# Patient Record
Sex: FEMALE | Race: WHITE | Employment: FULL TIME | URBAN - METROPOLITAN AREA
[De-identification: names, ages, dates, MRNs, and addresses within clinical notes are randomized per-mention and may not be internally consistent; named-entity substitution may affect disease eponyms.]

---

## 2017-01-26 ENCOUNTER — OFFICE VISIT (OUTPATIENT)
Dept: INTERNAL MEDICINE CLINIC | Age: 57
End: 2017-01-26

## 2017-01-26 VITALS
HEART RATE: 99 BPM | WEIGHT: 293 LBS | RESPIRATION RATE: 12 BRPM | SYSTOLIC BLOOD PRESSURE: 134 MMHG | HEIGHT: 67 IN | DIASTOLIC BLOOD PRESSURE: 77 MMHG | TEMPERATURE: 98.8 F | BODY MASS INDEX: 45.99 KG/M2

## 2017-01-26 DIAGNOSIS — E03.9 HYPOTHYROIDISM, UNSPECIFIED TYPE: ICD-10-CM

## 2017-01-26 DIAGNOSIS — L05.91 INFECTED PILONIDAL CYST: Primary | ICD-10-CM

## 2017-01-26 DIAGNOSIS — R79.0 LOW FERRITIN: ICD-10-CM

## 2017-01-26 DIAGNOSIS — E66.01 MORBID OBESITY, UNSPECIFIED OBESITY TYPE (HCC): ICD-10-CM

## 2017-01-26 DIAGNOSIS — I10 ESSENTIAL HYPERTENSION: ICD-10-CM

## 2017-01-26 RX ORDER — AMOXICILLIN AND CLAVULANATE POTASSIUM 875; 125 MG/1; MG/1
1 TABLET, FILM COATED ORAL 2 TIMES DAILY
Qty: 20 TAB | Refills: 0 | Status: SHIPPED | OUTPATIENT
Start: 2017-01-26 | End: 2017-02-05

## 2017-01-26 RX ORDER — VALSARTAN 160 MG/1
160 TABLET ORAL DAILY
Qty: 30 TAB | Refills: 2
Start: 2017-01-26 | End: 2017-09-15

## 2017-01-26 RX ORDER — PHENTERMINE HYDROCHLORIDE 37.5 MG/1
TABLET ORAL
Qty: 30 TAB | Refills: 3 | Status: SHIPPED | OUTPATIENT
Start: 2017-01-26 | End: 2017-04-06 | Stop reason: ALTCHOICE

## 2017-01-26 RX ORDER — HYDROCODONE BITARTRATE AND ACETAMINOPHEN 5; 325 MG/1; MG/1
1 TABLET ORAL
Qty: 20 TAB | Refills: 0 | Status: SHIPPED | OUTPATIENT
Start: 2017-01-26 | End: 2017-03-07 | Stop reason: ALTCHOICE

## 2017-01-26 NOTE — PROGRESS NOTES
Patient states she is here for a routine follow up on HTN. Has a recurring pilonidal cyst.  Last surgery on this at age 23. Dr Jerome Canavan recommended starting Valsartan. Has not yet.

## 2017-01-26 NOTE — MR AVS SNAPSHOT
Visit Information Date & Time Provider Department Dept. Phone Encounter #  
 1/26/2017 11:30 AM Powell Libman, MD [facility] [address] [id number] Upcoming Health Maintenance Date Due  
 PAP AKA CERVICAL CYTOLOGY 5/7/2018 BREAST CANCER SCRN MAMMOGRAM 6/16/2018 DTaP/Tdap/Td series (2 - Td) 8/31/2021 COLONOSCOPY 7/29/2025 Allergies as of 1/26/2017  Review Complete On: 1/26/2017 By: Powell Libman, MD  
  
 Severity Noted Reaction Type Reactions Effexor [Venlafaxine]  03/05/2009    Other (comments) Htn,nose bleeding,increase in anxiety Gluten  02/24/2016    Other (comments) Irritable bowel symptoms, bloating Levaquin [Levofloxacin]  02/22/2010   Side Effect Myalgia Per pt muscle weakness Lisinopril  08/31/2012    Diarrhea Metformin  08/12/2014    Diarrhea Other Medication  10/07/2010   Side Effect Swelling Family of grains Current Immunizations  Reviewed on 9/6/2016 Name Date Influenza Vaccine 8/24/2016 Influenza Vaccine Whole 9/21/2010 TD Vaccine 3/5/2006 TDAP Vaccine 8/31/2011 Not reviewed this visit You Were Diagnosed With   
  
 Codes Comments Infected pilonidal cyst    -  Primary ICD-10-CM: L05.91 
ICD-9-CM: 685.1 Morbid obesity, unspecified [facility] type ([facility]     ICD-10-CM: E66.01 
ICD-9-CM: 278.01 Essential hypertension     ICD-10-CM: I10 
ICD-9-CM: 401.9 Hypothyroidism, unspecified type     ICD-10-CM: E03.9 ICD-9-CM: 244.9 Low ferritin     ICD-10-CM: R79.0 ICD-9-CM: 790.6 Vitals BP Pulse Temp Resp Height(growth percentile) Weight(growth percentile) 134/77 (BP 1 Location: Left arm, BP Patient Position: Sitting) 99 98.8 °F (37.1 °C) (Oral) 12 5' 7\" (1.702 m) 299 lb (135.6 kg) LMP BMI OB Status Smoking Status 08/20/2000 46.83 kg/m2 Hysterectomy Never Smoker Vitals History BMI and BSA Data Body Mass Index Body Surface Area 46.83 kg/m 2 2.53 m 2 Preferred Pharmacy Pharmacy Name Phone [facility] [place] [facility] [phone] Your Updated Medication List  
  
   
This list is accurate as of: 1/26/17 12:26 PM.  Always use your most recent med list.  
  
  
  
  
 amoxicillin-clavulanate 875-125 mg per tablet Commonly known as:  AUGMENTIN Take 1 Tab by mouth two (2) times a day for 10 days. estradiol 1 mg tablet Commonly known as:  ESTRACE Take 1 Tab by mouth daily. ferrous sulfate 142 mg (45 mg iron) ER tablet Commonly known as:  SLOW FE Take 1 Tab by mouth Daily (before breakfast). hydroCHLOROthiazide 25 mg tablet Commonly known as:  HYDRODIURIL  
TAKE 1 TABLET BY MOUTH EVERY DAY FOR HIGH BLOOD PRESSURE  
  
 * levothyroxine 150 mcg tablet Commonly known as:  SYNTHROID Taking one pill 6 days per week * SYNTHROID 175 mcg tablet Generic drug:  levothyroxine Take 1 pill once weekly  
  
 pantoprazole 40 mg tablet Commonly known as:  PROTONIX Take 40 mg by mouth daily. phentermine 37.5 mg tablet Commonly known as:  ADIPEX-P Take 1/2 in AM and 1/2 in early afternoon  
  
 traMADol 50 mg tablet Commonly known as:  ULTRAM  
Take 50 mg by mouth every six (6) hours as needed for Pain.  
  
 valsartan 160 mg tablet Commonly known as:  DIOVAN Take 1 Tab by mouth daily. VITAMIN B COMPLEX PO Take  by mouth. VITAMIN C PO Take  by mouth. VITAMIN D3 4,000 unit Cap Generic drug:  cholecalciferol (vitamin D3) Take  by mouth. * Notice: This list has 2 medication(s) that are the same as other medications prescribed for you. Read the directions carefully, and ask your doctor or other care provider to review them with you. Prescriptions Printed Refills  
 phentermine (ADIPEX-P) 37.5 mg tablet 3 Sig: Take 1/2 in AM and 1/2 in early afternoon Class: Print Prescriptions Sent to Pharmacy Refills  
 amoxicillin-clavulanate (AUGMENTIN) 875-125 mg per tablet 0 Sig: Take 1 Tab by mouth two (2) times a day for 10 days. Class: Normal  
 Pharmacy: [place] [id number] [facility] [place] [address] #: [phone] Route: Oral  
  
We Performed the Following REFERRAL TO GENERAL SURGERY [REF27 Custom] Comments:  
 Please evaluate patient for infected pilonidal cyst.  Drained some 1/26. Started augmentin Referral Information Referral ID Referred By Referred To  
  
 [id number] [address], [facility] [address] Visits Status Start Date End Date 1 New Request 1/26/17 1/26/18 If your referral has a status of pending review or denied, additional information will be sent to support the outcome of this decision. Introducing [facility] & HEALTH SERVICES! Dear Chuck Erm: Thank you for requesting a [employer] account. Our records indicate that you already have an active [employer] account. You can access your account anytime at [email or web address] Wind Did you know that you can access your hospital and ER discharge instructions at any time in [employer]? You can also review all of your test results from your hospital stay or ER visit. Additional Information If you have questions, please visit the Frequently Asked Questions section of the [employer] website at [email or web address] Remember, [employer] is NOT to be used for urgent needs. For medical emergencies, dial 911. Now available from your iPhone and Android! Please provide this summary of care documentation to your next provider. Your primary care clinician is listed as Marsha Nicholas. If you have any questions after today's visit, please call [phone].

## 2017-01-27 NOTE — PROGRESS NOTES
HISTORY OF PRESENT ILLNESS  Rohan Ogden is a 49 y.o. female. HPI     Reports 5 days of pain and swelling of her superior gluteal cleft. His of pilonidal cyst in 1980s. She is soaking it. Reports moderate pain. Hypertension- did not take phentermine today. Was given valsartan 160 mg by Dr. Suzie Whittaker, but did not start yet  Hypertension ROS: taking medications as instructed, no medication side effects noted, no TIA's, no chest pain on exertion, no dyspnea on exertion, no swelling of ankles     reports that she has never smoked. She has never used smokeless tobacco.    reports that she does not drink alcohol. BP Readings from Last 2 Encounters:   01/26/17 134/77   10/21/16 134/82     Obesity. Taking phentermine 1/2 of 37.5 mg in AM and can not tolerate 2nd dose in afternoon due to insomnia. Lost > 7lb  Wt Readings from Last 3 Encounters:   01/26/17 299 lb (135.6 kg)   10/21/16 306 lb 9.6 oz (139.1 kg)   09/06/16 309 lb 12.8 oz (140.5 kg)     Ferritin levels were 55. Was told to get levels above 70 to help w hair loss and    Review of Systems   Constitutional: Negative for diaphoresis, malaise/fatigue and weight loss. Eyes: Negative for blurred vision. Respiratory: Negative for shortness of breath. Cardiovascular: Negative for chest pain. Gastrointestinal: Negative for abdominal pain. Genitourinary: Negative for flank pain and frequency. Musculoskeletal: Negative for myalgias. Skin: Negative for rash. Neurological: Negative for dizziness, weakness and headaches. Psychiatric/Behavioral: The patient is not nervous/anxious. All other systems reviewed and are negative. Physical Exam   Constitutional: She is oriented to person, place, and time. She appears well-developed and well-nourished. No distress. Cardiovascular: Normal rate. Pulmonary/Chest: Effort normal.   Musculoskeletal:        Back:    2 cm fluctuant region with copius drainage of purlent/bloody fluid.      6 cm firm region, reportedly chronic per pt   Neurological: She is alert and oriented to person, place, and time. Psychiatric: She has a normal mood and affect. Nursing note and vitals reviewed. ASSESSMENT and PLAN  Kelby Arenas was seen today for hypertension. Diagnoses and all orders for this visit:    Infected pilonidal cyst - start antibiotics. Refer to surgery next week. -     amoxicillin-clavulanate (AUGMENTIN) 875-125 mg per tablet; Take 1 Tab by mouth two (2) times a day for 10 days.  -     REFERRAL TO GENERAL SURGERY  -     HYDROcodone-acetaminophen (NORCO) 5-325 mg per tablet; Take 1 Tab by mouth every six (6) hours as needed for Pain. Max Daily Amount: 4 Tabs. Morbid obesity, unspecified obesity type [facility]) - she is doing better. Cont med. Monitor BP  -     phentermine (ADIPEX-P) 37.5 mg tablet; Take 1/2 in AM and 1/2 in early afternoon    Essential hypertension - likely too high on phentermine. Check home BPs  -     Start valsartan (DIOVAN) 160 mg tablet; Take 1 Tab by mouth daily. Hypothyroidism, unspecified type - Controlled on current regimen. Continue current medications as written in chart. Per Dr. Keila Leon    Low ferritin - mild. -    Start  ferrous sulfate (SLOW FE) 142 mg (45 mg iron) ER tablet; Take 1 Tab by mouth Daily (before breakfast).

## 2017-03-07 ENCOUNTER — OFFICE VISIT (OUTPATIENT)
Dept: INTERNAL MEDICINE CLINIC | Age: 57
End: 2017-03-07

## 2017-03-07 VITALS
TEMPERATURE: 98.9 F | DIASTOLIC BLOOD PRESSURE: 82 MMHG | WEIGHT: 293 LBS | HEIGHT: 67 IN | RESPIRATION RATE: 18 BRPM | BODY MASS INDEX: 45.99 KG/M2 | HEART RATE: 93 BPM | OXYGEN SATURATION: 98 % | SYSTOLIC BLOOD PRESSURE: 144 MMHG

## 2017-03-07 DIAGNOSIS — J06.9 VIRAL UPPER RESPIRATORY TRACT INFECTION: ICD-10-CM

## 2017-03-07 DIAGNOSIS — J02.9 SORE THROAT: Primary | ICD-10-CM

## 2017-03-07 LAB
S PYO AG THROAT QL: NEGATIVE
VALID INTERNAL CONTROL?: YES

## 2017-03-07 RX ORDER — AZITHROMYCIN 250 MG/1
250 TABLET, FILM COATED ORAL SEE ADMIN INSTRUCTIONS
Qty: 6 TAB | Refills: 0 | Status: SHIPPED | OUTPATIENT
Start: 2017-03-07 | End: 2017-03-12

## 2017-03-07 NOTE — PROGRESS NOTES
Have you been to the ER or urgent care clinic since your last visit? No     Have you been hospitalized since your last visit? No     Have you been seen or consulted any other health care provider outside of [address] [place] [address] since your last visit (including pap smears, colonoscopy screening)?   No

## 2017-03-07 NOTE — PATIENT INSTRUCTIONS
Viral Respiratory Infection: Care Instructions  Your Care Instructions  Viruses are very small organisms. They grow in number after they enter your body. There are many types that cause different illnesses, such as colds and the mumps. The symptoms of a viral respiratory infection often start quickly. They include a fever, sore throat, and runny nose. You may also just not feel well. Or you may not want to eat much. Most viral respiratory infections are not serious. They usually get better with time and self-care. Antibiotics are not used to treat a viral infection. That's because antibiotics will not help cure a viral illness. In some cases, antiviral medicine can help your body fight a serious viral infection. Follow-up care is a key part of your treatment and safety. Be sure to make and go to all appointments, and call your doctor if you are having problems. It's also a good idea to know your test results and keep a list of the medicines you take. How can you care for yourself at home? · Rest as much as possible until you feel better. · Be safe with medicines. Take your medicine exactly as prescribed. Call your doctor if you think you are having a problem with your medicine. You will get more details on the specific medicine your doctor prescribes. · Take an over-the-counter pain medicine, such as acetaminophen (Tylenol), ibuprofen (Advil, Motrin), or naproxen (Aleve), as needed for pain and fever. Read and follow all instructions on the label. Do not give aspirin to anyone younger than 20. It has been linked to Reye syndrome, a serious illness. · Drink plenty of fluids, enough so that your urine is light yellow or clear like water. Hot fluids, such as tea or soup, may help relieve congestion in your nose and throat. If you have kidney, heart, or liver disease and have to limit fluids, talk with your doctor before you increase the amount of fluids you drink.   · Try to clear mucus from your lungs by breathing deeply and coughing. · Gargle with warm salt water once an hour. This can help reduce swelling and throat pain. Use 1 teaspoon of salt mixed in 1 cup of warm water. · Do not smoke or allow others to smoke around you. If you need help quitting, talk to your doctor about stop-smoking programs and medicines. These can increase your chances of quitting for good. To avoid spreading the virus  · Cough or sneeze into a tissue. Then throw the tissue away. · If you don't have a tissue, use your hand to cover your cough or sneeze. Then clean your hand. You can also cough into your sleeve. · Wash your hands often. Use soap and warm water. Wash for 15 to 20 seconds each time. · If you don't have soap and water near you, you can clean your hands with alcohol wipes or gel. When should you call for help? Call your doctor now or seek immediate medical care if:  · You have a new or higher fever. · Your fever lasts more than 48 hours. · You have trouble breathing. · You have a fever with a stiff neck or a severe headache. · You are sensitive to light. · You feel very sleepy or confused. Watch closely for changes in your health, and be sure to contact your doctor if:  · You do not get better as expected. Where can you learn more? Go to http://lonnie-parrish.info/. Enter A542 in the search box to learn more about \"Viral Respiratory Infection: Care Instructions. \"  Current as of: June 30, 2016  Content Version: 11.1  © 9275-8182 [employer]. Care instructions adapted under license by [employer] (which disclaims liability or warranty for this information). If you have questions about a medical condition or this instruction, always ask your healthcare professional. [address] any warranty or liability for your use of this information.

## 2017-03-07 NOTE — MR AVS SNAPSHOT
Visit Information Date & Time Provider Department Dept. Phone Encounter #  
 3/7/2017 11:40 AM Cleve Galicia NP [facility] [address] [id number] Upcoming Health Maintenance Date Due  
 PAP AKA CERVICAL CYTOLOGY 5/7/2018 BREAST CANCER SCRN MAMMOGRAM 6/16/2018 DTaP/Tdap/Td series (2 - Td) 8/31/2021 COLONOSCOPY 7/29/2025 Allergies as of 3/7/2017  Review Complete On: 3/7/2017 By: Cleve Galicia NP Severity Noted Reaction Type Reactions Effexor [Venlafaxine]  03/05/2009    Other (comments) Htn,nose bleeding,increase in anxiety Gluten  02/24/2016    Other (comments) Irritable bowel symptoms, bloating Levaquin [Levofloxacin]  02/22/2010   Side Effect Myalgia Per pt muscle weakness Lisinopril  08/31/2012    Diarrhea Metformin  08/12/2014    Diarrhea Other Medication  10/07/2010   Side Effect Swelling Family of grains Current Immunizations  Reviewed on 9/6/2016 Name Date Influenza Vaccine 8/24/2016 Influenza Vaccine Whole 9/21/2010 TD Vaccine 3/5/2006 TDAP Vaccine 8/31/2011 Not reviewed this visit You Were Diagnosed With   
  
 Codes Comments Sore throat    -  Primary ICD-10-CM: J02.9 ICD-9-CM: 190 Viral upper respiratory tract infection     ICD-10-CM: J06.9, B97.89 ICD-9-CM: 465.9 Vitals BP Pulse Temp Resp Height(growth percentile) Weight(growth percentile) 144/82 (BP 1 Location: Left arm, BP Patient Position: Sitting) 93 98.9 °F (37.2 °C) (Oral) 18 5' 7\" (1.702 m) 300 lb (136.1 kg) LMP SpO2 BMI OB Status Smoking Status 08/20/2000 98% 46.99 kg/m2 Hysterectomy Never Smoker BMI and BSA Data Body Mass Index Body Surface Area  
 46.99 kg/m 2 2.54 m 2 Preferred Pharmacy Pharmacy Name Phone [place] [address] [place], [place] [phone] Your Updated Medication List  
  
   
 This list is accurate as of: 3/7/17 11:44 AM.  Always use your most recent med list.  
  
  
  
  
 azithromycin 250 mg tablet Commonly known as:  Yaz Abu Take 1 Tab by mouth See Admin Instructions for 5 days. benzocaine-menthol 15-2.6 mg Lozg lozenge Commonly known as:  CEPACOL SORE THROAT (ROCHELLE-MEN) Take 1 Lozenge by mouth every two (2) hours as needed for Sore throat.  
  
 estradiol 1 mg tablet Commonly known as:  ESTRACE Take 1 Tab by mouth daily. ferrous sulfate 142 mg (45 mg iron) ER tablet Commonly known as:  SLOW FE Take 1 Tab by mouth Daily (before breakfast). guaiFENesin-dextromethorphan -30 mg per tablet Commonly known as:  Charlie & Charlie DM Take 1 Tab by mouth every twelve (12) hours as needed for Cough. hydroCHLOROthiazide 25 mg tablet Commonly known as:  HYDRODIURIL  
TAKE 1 TABLET BY MOUTH EVERY DAY FOR HIGH BLOOD PRESSURE  
  
 * levothyroxine 150 mcg tablet Commonly known as:  SYNTHROID Taking one pill 6 days per week * SYNTHROID 175 mcg tablet Generic drug:  levothyroxine Take 1 pill once weekly  
  
 multivits,Stress Formula-Zinc tablet Take 1 Tab by mouth daily. phentermine 37.5 mg tablet Commonly known as:  ADIPEX-P Take 1/2 in AM and 1/2 in early afternoon  
  
 valsartan 160 mg tablet Commonly known as:  DIOVAN Take 1 Tab by mouth daily. VITAMIN B COMPLEX PO Take  by mouth. VITAMIN C PO Take  by mouth. VITAMIN D3 4,000 unit Cap Generic drug:  cholecalciferol (vitamin D3) Take  by mouth. * Notice: This list has 2 medication(s) that are the same as other medications prescribed for you. Read the directions carefully, and ask your doctor or other care provider to review them with you. Prescriptions Printed Refills  
 azithromycin (ZITHROMAX) 250 mg tablet 0 Sig: Take 1 Tab by mouth See Admin Instructions for 5 days. Class: Print  Route: Oral  
  
 We Performed the Following AMB POC RAPID STREP A [51097 CPT(R)] Patient Instructions Viral Respiratory Infection: Care Instructions Your Care Instructions Viruses are very small organisms. They grow in number after they enter your body. There are many types that cause different illnesses, such as colds and the mumps. The symptoms of a viral respiratory infection often start quickly. They include a fever, sore throat, and runny nose. You may also just not feel well. Or you may not want to eat much. Most viral respiratory infections are not serious. They usually get better with time and self-care. Antibiotics are not used to treat a viral infection. That's because antibiotics will not help cure a viral illness. In some cases, antiviral medicine can help your body fight a serious viral infection. Follow-up care is a key part of your treatment and safety. Be sure to make and go to all appointments, and call your doctor if you are having problems. It's also a good idea to know your test results and keep a list of the medicines you take. How can you care for yourself at home? · Rest as much as possible until you feel better. · Be safe with medicines. Take your medicine exactly as prescribed. Call your doctor if you think you are having a problem with your medicine. You will get more details on the specific medicine your doctor prescribes. · Take an over-the-counter pain medicine, such as acetaminophen (Tylenol), ibuprofen (Advil, Motrin), or naproxen (Aleve), as needed for pain and fever. Read and follow all instructions on the label. Do not give aspirin to anyone younger than 20. It has been linked to Reye syndrome, a serious illness. · Drink plenty of fluids, enough so that your urine is light yellow or clear like water. Hot fluids, such as tea or soup, may help relieve congestion in your nose and throat.  If you have kidney, heart, or liver disease and have to limit fluids, talk with your doctor before you increase the amount of fluids you drink. · Try to clear mucus from your lungs by breathing deeply and coughing. · Gargle with warm salt water once an hour. This can help reduce swelling and throat pain. Use 1 teaspoon of salt mixed in 1 cup of warm water. · Do not smoke or allow others to smoke around you. If you need help quitting, talk to your doctor about stop-smoking programs and medicines. These can increase your chances of quitting for good. To avoid spreading the virus · Cough or sneeze into a tissue. Then throw the tissue away. · If you don't have a tissue, use your hand to cover your cough or sneeze. Then clean your hand. You can also cough into your sleeve. · Wash your hands often. Use soap and warm water. Wash for 15 to 20 seconds each time. · If you don't have soap and water near you, you can clean your hands with alcohol wipes or gel. When should you call for help? Call your doctor now or seek immediate medical care if: 
· You have a new or higher fever. · Your fever lasts more than 48 hours. · You have trouble breathing. · You have a fever with a stiff neck or a severe headache. · You are sensitive to light. · You feel very sleepy or confused. Watch closely for changes in your health, and be sure to contact your doctor if: 
· You do not get better as expected. Where can you learn more? Go to http://lonnie-parrish.info/. Enter I701 in the search box to learn more about \"Viral Respiratory Infection: Care Instructions. \" Current as of: June 30, 2016 Content Version: 11.1 © 0680-6145 [employer]. Care instructions adapted under license by [employer] (which disclaims liability or warranty for this information).  If you have questions about a medical condition or this instruction, always ask your healthcare professional. [employer] disclaims any warranty or liability for your use of this information. Introducing [facility] & HEALTH SERVICES! Dear Ernestine Arambula: Thank you for requesting a [employer] account. Our records indicate that you already have an active [employer] account. You can access your account anytime at [email or web address] Did you know that you can access your hospital and ER discharge instructions at any time in [employer]? You can also review all of your test results from your hospital stay or ER visit. Additional Information If you have questions, please visit the Frequently Asked Questions section of the [employer] website at [email or web address] Remember, [employer] is NOT to be used for urgent needs. For medical emergencies, dial 911. Now available from your iPhone and Android! Please provide this summary of care documentation to your next provider. Your primary care clinician is listed as Karena Bolton. If you have any questions after today's visit, please call [phone].

## 2017-03-07 NOTE — LETTER
NOTIFICATION RETURN TO WORK / SCHOOL 
 
3/7/2017 11:47 AM 
 
Ms. Joshua Aguirre [address] [id number] To Whom It May Concern: 
 
Joshua Aguirre is currently under the care of [address] [place] [address],[address]. Was seen in office today for medical illness and advised to remain out of work until 3/9/2017 She will return to work/school on: 3/9/2017 If there are questions or concerns please have the patient contact our office.  
 
 
 
Sincerely, 
 
 
Gloria Mcnulty NP

## 2017-03-07 NOTE — PROGRESS NOTES
HISTORY OF PRESENT ILLNESS  Jennifer Malcolm is a 49 y.o. female. HPI  Upper respiratory illness:  Jennifer Malcolm presents with complaints of congestion, sore throat, post nasal drip, cough described as harsh, nonproductive and headache for 4 days. no nausea and no vomiting . she has not had  myalgias, fever and chills. Symptoms are moderate. Over-the-counter remedies including Vitamin C and zinc tablets   has been used with poor relief of symptoms. Drinking plenty of fluids: yes  Asthma?:  no  non-smoker  Contacts with similar infections: yes         Review of Systems   Constitutional: Positive for malaise/fatigue. Negative for chills and fever. HENT: Positive for congestion and sore throat. Respiratory: Positive for cough. Negative for sputum production, shortness of breath and wheezing. Cardiovascular: Negative for chest pain and palpitations. Gastrointestinal: Negative for nausea and vomiting. Musculoskeletal: Negative for myalgias. Skin: Negative for rash. Neurological: Positive for headaches. Negative for dizziness and tingling. /82 (BP 1 Location: Left arm, BP Patient Position: Sitting)  Pulse 93  Temp 98.9 °F (37.2 °C) (Oral)   Resp 18  Ht 5' 7\" (1.702 m)  Wt 300 lb (136.1 kg)  LMP 08/20/2000  SpO2 98%  BMI 46.99 kg/m2  Physical Exam   Constitutional: She is oriented to person, place, and time. She appears well-developed and well-nourished. HENT:   Head: Normocephalic and atraumatic. Right Ear: External ear normal.   Left Ear: External ear normal.   Nose: Mucosal edema present. Right sinus exhibits no maxillary sinus tenderness. Left sinus exhibits no maxillary sinus tenderness. Mouth/Throat: Posterior oropharyngeal erythema present. No oropharyngeal exudate or posterior oropharyngeal edema. Neck: Normal range of motion. Neck supple. No thyromegaly present. Cardiovascular: Normal rate and regular rhythm.     Pulmonary/Chest: Effort normal and breath sounds normal. She has no wheezes. She has no rales. Dry cough   Lymphadenopathy:     She has no cervical adenopathy. Neurological: She is alert and oriented to person, place, and time. Psychiatric: She has a normal mood and affect. Her behavior is normal.   Nursing note and vitals reviewed. ASSESSMENT and PLAN  Juli Chiu was seen today for sore throat. Diagnoses and all orders for this visit:    Sore throat  -     AMB POC RAPID STREP A -- negative  -     benzocaine-menthol (CEPACOL SORE THROAT, ROCHELLE-MEN,) 15-2.6 mg lozg lozenge; Take 1 Lozenge by mouth every two (2) hours as needed for Sore throat. Viral upper respiratory tract infection -- symptomatic treatment at this point but given printed Rx for Zpack to fill if symptoms worsen over the next 48-72 hours. -     azithromycin (ZITHROMAX) 250 mg tablet; Take 1 Tab by mouth See Admin Instructions for 5 days.  -     guaiFENesin-dextromethorphan SR (MUCINEX DM) 600-30 mg per tablet; Take 1 Tab by mouth every twelve (12) hours as needed for Cough.       lab results and schedule of future lab studies reviewed with patient  reviewed diet, exercise and weight control  reviewed medications and side effects in detail

## 2017-03-14 RX ORDER — ALBUTEROL SULFATE 90 UG/1
1 AEROSOL, METERED RESPIRATORY (INHALATION)
Qty: 1 INHALER | Refills: 3 | Status: SHIPPED | OUTPATIENT
Start: 2017-03-14 | End: 2017-09-15

## 2017-03-14 RX ORDER — BENZONATATE 200 MG/1
200 CAPSULE ORAL
Qty: 21 CAP | Refills: 0 | Status: SHIPPED | OUTPATIENT
Start: 2017-03-14 | End: 2017-03-21

## 2017-03-31 ENCOUNTER — TELEPHONE (OUTPATIENT)
Dept: INTERNAL MEDICINE CLINIC | Age: 57
End: 2017-03-31

## 2017-03-31 NOTE — TELEPHONE ENCOUNTER
Pt called in to schedule apt with Dr Jw Wade. Scheduled for 4/6/17 at 815 AM she is having clicking noise in her ear and congestion. That was the next available apt. She only wants to see Dr Jw Wade but thought he may refer her to ENT and would like to talk with nurse .  Please call her at [phone]

## 2017-04-06 ENCOUNTER — OFFICE VISIT (OUTPATIENT)
Dept: INTERNAL MEDICINE CLINIC | Age: 57
End: 2017-04-06

## 2017-04-06 VITALS
HEART RATE: 90 BPM | HEIGHT: 67 IN | DIASTOLIC BLOOD PRESSURE: 79 MMHG | TEMPERATURE: 97.8 F | BODY MASS INDEX: 45.99 KG/M2 | OXYGEN SATURATION: 98 % | SYSTOLIC BLOOD PRESSURE: 135 MMHG | WEIGHT: 293 LBS | RESPIRATION RATE: 12 BRPM

## 2017-04-06 DIAGNOSIS — Z91.09 ENVIRONMENTAL ALLERGIES: Primary | ICD-10-CM

## 2017-04-06 DIAGNOSIS — H69.92 EUSTACHIAN TUBE DISORDER, LEFT: ICD-10-CM

## 2017-04-06 DIAGNOSIS — I10 ESSENTIAL HYPERTENSION: ICD-10-CM

## 2017-04-06 RX ORDER — FLUTICASONE PROPIONATE 50 MCG
2 SPRAY, SUSPENSION (ML) NASAL DAILY
Qty: 1 BOTTLE | Refills: 5
Start: 2017-04-06 | End: 2017-09-15

## 2017-04-06 RX ORDER — MINERAL OIL
180 ENEMA (ML) RECTAL DAILY
Qty: 30 TAB | Refills: 2
Start: 2017-04-06 | End: 2017-09-15

## 2017-04-06 NOTE — PROGRESS NOTES
Patient presents with a ticking in her left ear. Saw ENT 3 days. Her blood pressure was high. Started Diovan 3 days ago. Saw Idris Issa, still with chest congestion.

## 2017-04-06 NOTE — PATIENT INSTRUCTIONS
Allergy treatments    1 - Nasal saline rinse daily (try NeilMed sinus rinse)  2- Take steroid nasal spray (fluticasone, Nasonex, Nasocort)   3 - Allegra (fexofenadine) daily.    4 - Can add benadryl 25-50 mg at night for allergies/congestion and sleep

## 2017-04-06 NOTE — PROGRESS NOTES
HISTORY OF PRESENT ILLNESS    Presents with left ear clicking for 2 week(s). Saw ENT Dr. Adi Vogt 3 days ago. He said hearing was ok, normal exam.  Was told it may be the muscle behind the ear may be twitching. It is overall a little better over the past few days. Hypertension- her BP was 160/100 3 days ago at ENT. She had been up all night before. She started valsartan after this (3 days ago)  Hypertension ROS: no medication side effects noted, no TIA's, no chest pain on exertion, no dyspnea on exertion, no swelling of ankles     reports that she has never smoked. She has never used smokeless tobacco.    reports that she does not drink alcohol. BP Readings from Last 2 Encounters:   04/06/17 135/79   03/07/17 144/82     Also reports cough, productive, for 1 months. Evelio Hoffman. Review of Systems   All other systems reviewed and are negative, except as noted in HPI    Past Medical and Surgical History   has a past medical history of AR (allergic rhinitis); Biliary dyskinesia; Depression; Endometriosis; Environmental allergies (10/2010); Gluten intolerance; HTN (hypertension); Hyperlipidemia LDL goal < 130; Hypothyroidism; Impaired fasting glucose; Menopausal disorder; MR (mitral regurgitation); Sebaceous cyst of breast; Skin abnormalities (8/2010); Vitamin D deficiency; and VSD (ventricular septal defect). has a past surgical history that includes susie and bso (10/2008); tonsillectomy; femur/knee surg unlisted (12/30/2010); colonoscopy (9/2009); lap cholecystectomy (8/01/2014); colonoscopy (07/29/2015); endoscopy (07/29/2015); and cyst removal (1983). reports that she has never smoked. She has never used smokeless tobacco. She reports that she does not drink alcohol or use illicit drugs.   family history includes Arthritis-rheumatoid in her paternal grandmother; Cancer in her mother; Deep Vein Thrombosis in her father; Diabetes in her maternal grandmother; Heart Attack in her paternal grandfather; Heart Disease in her father and paternal grandfather; High Cholesterol in her father and paternal grandfather; Osteoporosis in her maternal aunt; Stroke in her mother; Thyroid Disease in her paternal grandmother. Physical Exam   Nursing note and vitals reviewed. Blood pressure 135/79, pulse 90, temperature 97.8 °F (36.6 °C), temperature source Oral, resp. rate 12, height 5' 7\" (1.702 m), weight 304 lb (137.9 kg), last menstrual period 08/20/2000, SpO2 98 %. Constitutional: In no distress. Eyes: Conjunctivae are normal.  HEENT:  No LAD or thyromegaly . Left ear normal  Cardiovascular: Normal rate. regular rhythm. No murmurs  No edema  Pulmonary/Chest: Effort normal. clear to ausculation blaterally  Musculoskeletal:  no edema. Abd:  Neurological: Alert and oriented. Grossly intact cranial nerves and motor function. Skin: No rash noted. Psychiatric: Normal mood and affect. Behavior is normal.     ASSESSMENT and PLAN  Rowena Franco was seen today for hypertension. Diagnoses and all orders for this visit:    Environmental allergies  -     fexofenadine (ALLEGRA) 180 mg tablet; Take 1 Tab by mouth daily. -     fluticasone (FLONASE) 50 mcg/actuation nasal spray; 2 Sprays by Both Nostrils route daily. Eustachian tube disorder, left  -     fluticasone (FLONASE) 50 mcg/actuation nasal spray; 2 Sprays by Both Nostrils route daily. Essential hypertension - improved since she re-started diovan    Can resume phentermine, but monitor BP closely        lab results and schedule of future lab studies reviewed with patient  reviewed medications and side effects in detail    Return to clinic for further evaluation if new symptoms develop or if current symptoms worsen or fail to resolve. There are no Patient Instructions on file for this visit.

## 2017-04-25 RX ORDER — HYDROCHLOROTHIAZIDE 25 MG/1
TABLET ORAL
Qty: 30 TAB | Refills: 5 | Status: SHIPPED | OUTPATIENT
Start: 2017-04-25 | End: 2017-10-25 | Stop reason: SDUPTHER

## 2017-05-11 ENCOUNTER — OFFICE VISIT (OUTPATIENT)
Dept: INTERNAL MEDICINE CLINIC | Age: 57
End: 2017-05-11

## 2017-05-11 VITALS
SYSTOLIC BLOOD PRESSURE: 160 MMHG | RESPIRATION RATE: 18 BRPM | HEIGHT: 67 IN | OXYGEN SATURATION: 98 % | DIASTOLIC BLOOD PRESSURE: 103 MMHG | WEIGHT: 293 LBS | BODY MASS INDEX: 45.99 KG/M2 | TEMPERATURE: 98.5 F | HEART RATE: 102 BPM

## 2017-05-11 DIAGNOSIS — L05.91 INFECTED PILONIDAL CYST: Primary | ICD-10-CM

## 2017-05-11 RX ORDER — AMOXICILLIN AND CLAVULANATE POTASSIUM 875; 125 MG/1; MG/1
1 TABLET, FILM COATED ORAL 2 TIMES DAILY
Qty: 20 TAB | Refills: 0 | Status: SHIPPED | OUTPATIENT
Start: 2017-05-11 | End: 2017-09-15

## 2017-05-11 RX ORDER — HYDROCODONE BITARTRATE AND ACETAMINOPHEN 5; 325 MG/1; MG/1
1 TABLET ORAL
Qty: 20 TAB | Refills: 0 | Status: SHIPPED | OUTPATIENT
Start: 2017-05-11 | End: 2017-09-15

## 2017-05-11 NOTE — MR AVS SNAPSHOT
Visit Information Date & Time Provider Department Dept. Phone Encounter #  
 5/11/2017  8:20 AM Adelaide Barriga NP [facility] [address] [id number] Upcoming Health Maintenance Date Due INFLUENZA AGE 9 TO ADULT 8/1/2017 PAP AKA CERVICAL CYTOLOGY 5/7/2018 BREAST CANCER SCRN MAMMOGRAM 6/16/2018 DTaP/Tdap/Td series (2 - Td) 8/31/2021 COLONOSCOPY 7/29/2025 Allergies as of 5/11/2017  Review Complete On: 5/11/2017 By: Adelaide Barriga NP Severity Noted Reaction Type Reactions Effexor [Venlafaxine]  03/05/2009    Other (comments) Htn,nose bleeding,increase in anxiety Gluten  02/24/2016    Other (comments) Irritable bowel symptoms, bloating Levaquin [Levofloxacin]  02/22/2010   Side Effect Myalgia Per pt muscle weakness Lisinopril  08/31/2012    Diarrhea Metformin  08/12/2014    Diarrhea Other Medication  10/07/2010   Side Effect Swelling Family of grains Current Immunizations  Reviewed on 9/6/2016 Name Date Influenza Vaccine 8/24/2016 Influenza Vaccine Whole 9/21/2010 TD Vaccine 3/5/2006 TDAP Vaccine 8/31/2011 Not reviewed this visit You Were Diagnosed With   
  
 Codes Comments Infected pilonidal cyst    -  Primary ICD-10-CM: L05.91 
ICD-9-CM: 352. 1 Vitals BP Pulse Temp Resp Height(growth percentile) Weight(growth percentile) (!) 160/103 (!) 102 98.5 °F (36.9 °C) (Oral) 18 5' 7\" (1.702 m) 304 lb (137.9 kg) LMP SpO2 BMI OB Status Smoking Status 08/20/2000 98% 47.61 kg/m2 Hysterectomy Never Smoker Vitals History BMI and BSA Data Body Mass Index Body Surface Area  
 47.61 kg/m 2 2.55 m 2 Preferred Pharmacy Pharmacy Name Phone [facility] [place] [facility] [phone] Your Updated Medication List  
  
   
This list is accurate as of: 5/11/17  8:38 AM.  Always use your most recent med list.  
 albuterol 90 mcg/actuation inhaler Commonly known as:  PROAIR HFA Take 1 Puff by inhalation every four (4) hours as needed for Wheezing or Shortness of Breath. amoxicillin-clavulanate 875-125 mg per tablet Commonly known as:  AUGMENTIN Take 1 Tab by mouth two (2) times a day. estradiol 1 mg tablet Commonly known as:  ESTRACE Take 1 Tab by mouth daily. fexofenadine 180 mg tablet Commonly known as:  Ethyl Render Take 1 Tab by mouth daily. fluticasone 50 mcg/actuation nasal spray Commonly known as:  [place] Jeremy 2 Sprays by Both Nostrils route daily. hydroCHLOROthiazide 25 mg tablet Commonly known as:  HYDRODIURIL  
TAKE 1 TABLET BY MOUTH EVERY DAY FOR HIGH BLOOD PRESSURE HYDROcodone-acetaminophen 5-325 mg per tablet Commonly known as:  Adilia Proper Take 1 Tab by mouth every six (6) hours as needed for Pain. Max Daily Amount: 4 Tabs. * levothyroxine 150 mcg tablet Commonly known as:  SYNTHROID Taking one pill 6 days per week * SYNTHROID 175 mcg tablet Generic drug:  levothyroxine Take 1 pill once weekly  
  
 multivits,Stress Formula-Zinc tablet Take 1 Tab by mouth daily. valsartan 160 mg tablet Commonly known as:  DIOVAN Take 1 Tab by mouth daily. VITAMIN B COMPLEX PO Take  by mouth. VITAMIN C PO Take  by mouth. VITAMIN D3 4,000 unit Cap Generic drug:  cholecalciferol (vitamin D3) Take  by mouth. * Notice: This list has 2 medication(s) that are the same as other medications prescribed for you. Read the directions carefully, and ask your doctor or other care provider to review them with you. Prescriptions Printed Refills HYDROcodone-acetaminophen (NORCO) 5-325 mg per tablet 0 Sig: Take 1 Tab by mouth every six (6) hours as needed for Pain. Max Daily Amount: 4 Tabs. Class: Print Route: Oral  
  
Prescriptions Sent to Pharmacy Refills amoxicillin-clavulanate (AUGMENTIN) 875-125 mg per tablet 0 Sig: Take 1 Tab by mouth two (2) times a day. Class: Normal  
 Pharmacy: [address] [place] [address] #: [phone] Route: Oral  
  
We Performed the Following REFERRAL TO GENERAL SURGERY [REF27 Custom] Comments:  
 Please evaluate patient for infected pilonidal cyst.  
  
Referral Information Referral ID Referred By Referred To  
  
 [id number] Humera Walker Not Available Visits Status Start Date End Date 1 New Request 5/11/17 5/11/18 If your referral has a status of pending review or denied, additional information will be sent to support the outcome of this decision. Patient Instructions Pilonidal Abscess: Care Instructions Your Care Instructions A pilonidal abscess is an infection caused by an ingrown hair. The abscess occurs in the area of the tailbone and the top of the buttocks. The infection causes a pocket of pus to form. It can be quite painful. Your doctor may have opened and drained the abscess. You can take care of yourself at home to help the area heal. In some cases, the abscess returns. Your doctor may suggest surgery to remove the site of the infection if it comes back. You may have had a sedative to help you relax. You may be unsteady after having sedation. It can take a few hours for the medicine's effects to wear off. Common side effects of sedation include nausea, vomiting, and feeling sleepy or tired. The doctor has checked you carefully, but problems can develop later. If you notice any problems or new symptoms, get medical treatment right away. Follow-up care is a key part of your treatment and safety. Be sure to make and go to all appointments, and call your doctor if you are having problems. It's also a good idea to know your test results and keep a list of the medicines you take. How can you care for yourself at home? · If the doctor gave you a sedative: ¨ For 24 hours, don't do anything that requires attention to detail. It takes time for the medicine's effects to completely wear off. ¨ For your safety, do not drive or operate any machinery that could be dangerous. Wait until the medicine wears off and you can think clearly and react easily. · If your doctor prescribed antibiotics, take them exactly as directed. Do not stop taking them just because you feel better. You need to take the full course of antibiotics. · Be safe with medicines. Take pain medicines exactly as directed. ¨ If the doctor gave you a prescription medicine for pain, take it as prescribed. ¨ If you are not taking a prescription pain medicine, ask your doctor if you can take an over-the-counter medicine. · If your doctor opened and drained your abscess, you may have gauze or other packing material inside your wound. Follow all instructions from your doctor on how to care for your wound. · Keep the area of your wound very clean. Use wet cotton balls, a warm washcloth, or baby wipes. Clean the area gently, especially after a bowel movement. When should you call for help? Call 911 anytime you think you may need emergency care. For example, call if: 
· You have trouble breathing. · You passed out (lost consciousness). Call your doctor now or seek immediate medical care if: 
· You have new or worse nausea or vomiting. · Your pain increases. · You have pain with a fever. Watch closely for changes in your health, and be sure to contact your doctor if: 
· Your pain does not get better in a day or two. Where can you learn more? Go to http://lonnie-parrish.info/. Enter 22 [id number] in the search box to learn more about \"Pilonidal Abscess: Care Instructions. \" Current as of: October 13, 2016 Content Version: 11.2 © 4559-4632 [employer].  Care instructions adapted under license by [address] (which disclaims liability or warranty for this information). If you have questions about a medical condition or this instruction, always ask your healthcare professional. [address] any warranty or liability for your use of this information. Introducing \[facility] [place] [facility]\"" & HEALTH SERVICES! Dear Mohit Wilson: Thank you for requesting a [employer] account. Our records indicate that you already have an active [employer] account. You can access your account anytime at [email or web address] Did you know that you can access your hospital and ER discharge instructions at any time in [employer]? You can also review all of your test results from your hospital stay or ER visit. Additional Information If you have questions, please visit the Frequently Asked Questions section of the [employer] website at [email or web address] Remember, [employer] is NOT to be used for urgent needs. For medical emergencies, dial 911. Now available from your iPhone and Android! Please provide this summary of care documentation to your next provider. Your primary care clinician is listed as Hayley Larson. If you have any questions after today's visit, please call [phone].

## 2017-05-11 NOTE — PATIENT INSTRUCTIONS
Pilonidal Abscess: Care Instructions  Your Care Instructions    A pilonidal abscess is an infection caused by an ingrown hair. The abscess occurs in the area of the tailbone and the top of the buttocks. The infection causes a pocket of pus to form. It can be quite painful. Your doctor may have opened and drained the abscess. You can take care of yourself at home to help the area heal. In some cases, the abscess returns. Your doctor may suggest surgery to remove the site of the infection if it comes back. You may have had a sedative to help you relax. You may be unsteady after having sedation. It can take a few hours for the medicine's effects to wear off. Common side effects of sedation include nausea, vomiting, and feeling sleepy or tired. The doctor has checked you carefully, but problems can develop later. If you notice any problems or new symptoms, get medical treatment right away. Follow-up care is a key part of your treatment and safety. Be sure to make and go to all appointments, and call your doctor if you are having problems. It's also a good idea to know your test results and keep a list of the medicines you take. How can you care for yourself at home? · If the doctor gave you a sedative:  ¨ For 24 hours, don't do anything that requires attention to detail. It takes time for the medicine's effects to completely wear off. ¨ For your safety, do not drive or operate any machinery that could be dangerous. Wait until the medicine wears off and you can think clearly and react easily. · If your doctor prescribed antibiotics, take them exactly as directed. Do not stop taking them just because you feel better. You need to take the full course of antibiotics. · Be safe with medicines. Take pain medicines exactly as directed. ¨ If the doctor gave you a prescription medicine for pain, take it as prescribed.   ¨ If you are not taking a prescription pain medicine, ask your doctor if you can take an over-the-counter medicine. · If your doctor opened and drained your abscess, you may have gauze or other packing material inside your wound. Follow all instructions from your doctor on how to care for your wound. · Keep the area of your wound very clean. Use wet cotton balls, a warm washcloth, or baby wipes. Clean the area gently, especially after a bowel movement. When should you call for help? Call 911 anytime you think you may need emergency care. For example, call if:  · You have trouble breathing. · You passed out (lost consciousness). Call your doctor now or seek immediate medical care if:  · You have new or worse nausea or vomiting. · Your pain increases. · You have pain with a fever. Watch closely for changes in your health, and be sure to contact your doctor if:  · Your pain does not get better in a day or two. Where can you learn more? Go to http://lonnie-parrish.info/. Enter 22 [id number] in the search box to learn more about \"Pilonidal Abscess: Care Instructions. \"  Current as of: October 13, 2016  Content Version: 11.2  © 5315-3869 [employer]. Care instructions adapted under license by [employer] (which disclaims liability or warranty for this information). If you have questions about a medical condition or this instruction, always ask your healthcare professional. [address] any warranty or liability for your use of this information.

## 2017-05-11 NOTE — PROGRESS NOTES
HISTORY OF PRESENT ILLNESS  Yoel Haq is a 49 y.o. female. HPI  Presents with complaints of infected pilonidal cyst that flared 2 days ago and has been progressively worsening since then. Has had history of pilonidal cyst in her late teens and had excision of cyst at age 23. Recently flared in 1/2017 and she was treated with Augmentin and pain control and was referred to Dr Casi Looney who she saw at that time. Symptoms calmed down with antibiotic course and she was told to return if symptoms returned. Has been using Epson salts sitz baths for the past 2 days and applied moist heat to area last night but has been more painful. Has not started to drain. She is having increased pain when sitting and feels uncomfortable when ambulating. Denies fever but has felt chilled at times. Patient Active Problem List   Diagnosis Code    HTN (hypertension) I10    Hypothyroidism E03.9    AR (allergic rhinitis) J30.9    Impaired fasting glucose R73.01    Biliary dyskinesia K82.8    Endometriosis N80.9    Hyperlipidemia with target LDL less than 130 E78.5    VSD (ventricular septal defect) Q21.0    Menopausal disorder N95.9    Depression F32.9    Gluten intolerance K90.0    Vitamin D deficiency E55.9    MR (mitral regurgitation) I34.0    Sebaceous cyst of skin of breast N60.89    Sebaceous cyst of breast N60.89    Skin abnormalities L98.9    Environmental allergies Z91.09     Past Surgical History:   Procedure Laterality Date    COLONOSCOPY  9/2009    Dr. Dontae Lazcano  12/30/2010    left, had lipoma removed.   Dr. Mary Jane Westbrook    HX COLONOSCOPY  07/29/2015    normal.  Dr. Sabino Cardona HX ENDOSCOPY  07/29/2015    gastritis    HX LAP CHOLECYSTECTOMY  8/01/2014    Dr. Campos Greek Carol/[place] and adhesions    HX LASHANDA AND BSO  10/2008    HX TONSILLECTOMY       Social History     Social History    Marital status: SINGLE     Spouse name: N/A    Number of children: 0  Years of education: N/A     Occupational History    Not on file. Social History Main Topics    Smoking status: Never Smoker    Smokeless tobacco: Never Used    Alcohol use No    Drug use: No    Sexual activity: Not Currently     Other Topics Concern    Not on file     Social History Narrative     Family History   Problem Relation Age of Onset   [facility] Cancer Mother      breast age 48    Stroke Mother     Heart Disease Father      CABGx4    High Cholesterol Father     Deep Vein Thrombosis Father      left arm    Diabetes Maternal Grandmother     Heart Disease Paternal Grandfather     High Cholesterol Paternal Grandfather     Heart Attack Paternal Grandfather     Arthritis-rheumatoid Paternal Grandmother     Thyroid Disease Paternal Grandmother     Osteoporosis Maternal Aunt      Current Outpatient Prescriptions   Medication Sig    amoxicillin-clavulanate (AUGMENTIN) 875-125 mg per tablet Take 1 Tab by mouth two (2) times a day.  HYDROcodone-acetaminophen (NORCO) 5-325 mg per tablet Take 1 Tab by mouth every six (6) hours as needed for Pain. Max Daily Amount: 4 Tabs.  hydroCHLOROthiazide (HYDRODIURIL) 25 mg tablet TAKE 1 TABLET BY MOUTH EVERY DAY FOR HIGH BLOOD PRESSURE    multivits,Stress Formula-Zinc tablet Take 1 Tab by mouth daily.  valsartan (DIOVAN) 160 mg tablet Take 1 Tab by mouth daily.  levothyroxine (SYNTHROID) 150 mcg tablet Taking one pill 6 days per week (Patient taking differently: 150 mcg. Taking one pill 6 days per week)    SYNTHROID 175 mcg tablet Take 1 pill once weekly    estradiol (ESTRACE) 1 mg tablet Take 1 Tab by mouth daily.  ASCORBATE CALCIUM (VITAMIN C PO) Take  by mouth.  cholecalciferol, vitamin D3, (VITAMIN D3) 4,000 unit cap Take  by mouth.  VITAMIN B COMPLEX PO Take  by mouth.  fexofenadine (ALLEGRA) 180 mg tablet Take 1 Tab by mouth daily.  fluticasone (FLONASE) 50 mcg/actuation nasal spray 2 Sprays by Both Nostrils route daily.     albuterol (PROAIR HFA) 90 mcg/actuation inhaler Take 1 Puff by inhalation every four (4) hours as needed for Wheezing or Shortness of Breath. No current facility-administered medications for this visit. Allergies   Allergen Reactions    Effexor [Venlafaxine] Other (comments)     Htn,nose bleeding,increase in anxiety    Gluten Other (comments)     Irritable bowel symptoms, bloating     Levaquin [Levofloxacin] Myalgia     Per pt muscle weakness    Lisinopril Diarrhea    Metformin Diarrhea    Other Medication Swelling     Family of grains     Immunization History   Administered Date(s) Administered    Influenza Vaccine 08/24/2016    Influenza Vaccine Whole 09/21/2010    TD Vaccine 03/05/2006    TDAP Vaccine 08/31/2011       Review of Systems   Constitutional: Positive for chills and malaise/fatigue. Negative for fever. Respiratory: Negative for cough. Cardiovascular: Negative for chest pain and palpitations. Gastrointestinal: Negative for abdominal pain, nausea and vomiting. Genitourinary: Negative for dysuria and frequency. Skin: Negative for rash. Neurological: Negative for dizziness, tingling and headaches. BP (!) 160/103  Pulse (!) 102  Temp 98.5 °F (36.9 °C) (Oral)   Resp 18  Ht 5' 7\" (1.702 m)  Wt 304 lb (137.9 kg)  LMP 08/20/2000  SpO2 98%  BMI 47.61 kg/m2  Physical Exam   Constitutional: She is oriented to person, place, and time. She appears well-developed and well-nourished. HENT:   Head: Normocephalic and atraumatic. Neck: Normal range of motion. Neck supple. Cardiovascular: S1 normal and S2 normal.  Tachycardia present. Pulmonary/Chest: Effort normal and breath sounds normal. She has no wheezes. Musculoskeletal:        Lumbar back: She exhibits tenderness. Back:    2 cm fluctuant mass with surrounding induration and erythema to gluteal cleft; no drainage at site   Neurological: She is alert and oriented to person, place, and time.    Psychiatric: Her mood appears anxious. tearful   Nursing note and vitals reviewed. ASSESSMENT and PLAN  Mohit Wilson was seen today for cyst.    Diagnoses and all orders for this visit:    Infected pilonidal cyst -- referred to general surgery who will see her this am for possible incision and drainage.  -     REFERRAL TO GENERAL SURGERY  -     amoxicillin-clavulanate (AUGMENTIN) 875-125 mg per tablet; Take 1 Tab by mouth two (2) times a day. -     HYDROcodone-acetaminophen (NORCO) 5-325 mg per tablet; Take 1 Tab by mouth every six (6) hours as needed for Pain. Max Daily Amount: 4 Tabs.       reviewed diet, exercise and weight control  reviewed medications and side effects in detail

## 2017-07-26 DIAGNOSIS — M25.50 ARTHRALGIA, UNSPECIFIED JOINT: ICD-10-CM

## 2017-07-26 RX ORDER — MELOXICAM 15 MG/1
TABLET ORAL
Qty: 30 TAB | Refills: 2 | Status: SHIPPED | OUTPATIENT
Start: 2017-07-26 | End: 2017-11-02 | Stop reason: ALTCHOICE

## 2017-09-15 ENCOUNTER — OFFICE VISIT (OUTPATIENT)
Dept: OBGYN CLINIC | Age: 57
End: 2017-09-15

## 2017-09-15 ENCOUNTER — APPOINTMENT (OUTPATIENT)
Dept: MAMMOGRAPHY | Age: 57
End: 2017-09-15

## 2017-09-15 VITALS
RESPIRATION RATE: 19 BRPM | SYSTOLIC BLOOD PRESSURE: 154 MMHG | HEIGHT: 67 IN | WEIGHT: 293 LBS | DIASTOLIC BLOOD PRESSURE: 105 MMHG | HEART RATE: 88 BPM | BODY MASS INDEX: 45.99 KG/M2

## 2017-09-15 DIAGNOSIS — Z12.31 ENCOUNTER FOR SCREENING MAMMOGRAM FOR MALIGNANT NEOPLASM OF BREAST: ICD-10-CM

## 2017-09-15 DIAGNOSIS — Z01.419 WELL FEMALE EXAM WITH ROUTINE GYNECOLOGICAL EXAM: Primary | ICD-10-CM

## 2017-09-15 DIAGNOSIS — Z11.3 SCREENING FOR STD (SEXUALLY TRANSMITTED DISEASE): ICD-10-CM

## 2017-09-15 NOTE — PROGRESS NOTES
Tonny Meng is a No obstetric history on file. ,  49 y.o. female [facility] whose Patient's last menstrual period was 08/20/2000. was on  who presents for her annual checkup. She is having no significant problems. She wants STD testing. Hormone Status:    She is not having vasomotor symptoms. She stopped her HRT. The patient is not using HRT. Sexual history:    She  reports that she does not currently engage in sexual activity. Medical conditions:    Since her last annual GYN exam about one year ago, she has had the following changes in her health history: none. Pap and Mammogram History:    Her most recent Pap smear was Negative and HPV negative obtained 2 year(s) ago. The patient had her mammogram today in our office. Breast Cancer History/Substance Abuse:    She has a family history of breast cancer. Osteoporosis History:    Family history does not include a first or second degree relative with osteopenia or osteoporosis. A bone density scan has not been previously obtained. Past Medical History:   Diagnosis Date    AR (allergic rhinitis)     Dr. Velvet Skiff dyskinesia     s/p azeb    Depression     Endometriosis     LASHANDA 10/2008 Dr. Steff Ledbetter Environmental allergies 10/2010    oats, barley, cotton seed. Dr. Sim Cabral    Gluten intolerance     HTN (hypertension)     Hyperlipidemia LDL goal < 130     Hypothyroidism     Dr. Merritt Cabrales Impaired fasting glucose     105 7/2008    Menopausal disorder     MR (mitral regurgitation)     mild    Sebaceous cyst of breast     Dr. Beverly Askew Skin abnormalities 8/2010    pre-cancerous lesions of face, Dr. Shann Rubinstein D deficiency     VSD (ventricular septal defect)     self-closed age 11, TTE normal 3/09     Past Surgical History:   Procedure Laterality Date    COLONOSCOPY  9/2009    Dr. Yeny Andrade  12/30/2010    left, had lipoma removed.   Dr. Jennifer Ybarra 07/29/2015    normal.  Dr. Bonifacio Andino HX ENDOSCOPY  07/29/2015    gastritis    HX LAP CHOLECYSTECTOMY  8/01/2014    Dr. Boris Medina/[place] and adhesions    HX LASHANDA AND BSO  10/2008    HX TONSILLECTOMY       Tobacco History:  reports that she has never smoked. She has never used smokeless tobacco.  Alcohol Abuse:  reports that she does not drink alcohol. Drug Abuse:  reports that she does not use illicit drugs. Current Outpatient Prescriptions   Medication Sig Dispense Refill    meloxicam (MOBIC) 15 mg tablet TAKE 1 TABLET BY MOUTH EVERY DAY 30 Tab 2    hydroCHLOROthiazide (HYDRODIURIL) 25 mg tablet TAKE 1 TABLET BY MOUTH EVERY DAY FOR HIGH BLOOD PRESSURE 30 Tab 5    multivits,Stress Formula-Zinc tablet Take 1 Tab by mouth daily.  SYNTHROID 175 mcg tablet Take 1 pill once weekly 5 Tab 6    ASCORBATE CALCIUM (VITAMIN C PO) Take  by mouth.  cholecalciferol, vitamin D3, (VITAMIN D3) 4,000 unit cap Take  by mouth.  VITAMIN B COMPLEX PO Take  by mouth.  estradiol (ESTRACE) 1 mg tablet TAKE 1 TABLET BY MOUTH EVERY DAY 90 Tab 0    amoxicillin-clavulanate (AUGMENTIN) 875-125 mg per tablet Take 1 Tab by mouth two (2) times a day. 20 Tab 0    HYDROcodone-acetaminophen (NORCO) 5-325 mg per tablet Take 1 Tab by mouth every six (6) hours as needed for Pain. Max Daily Amount: 4 Tabs. 20 Tab 0    fexofenadine (ALLEGRA) 180 mg tablet Take 1 Tab by mouth daily. 30 Tab 2    fluticasone (FLONASE) 50 mcg/actuation nasal spray 2 Sprays by Both Nostrils route daily. 1 Bottle 5    albuterol (PROAIR HFA) 90 mcg/actuation inhaler Take 1 Puff by inhalation every four (4) hours as needed for Wheezing or Shortness of Breath. 1 Inhaler 3    valsartan (DIOVAN) 160 mg tablet Take 1 Tab by mouth daily. 30 Tab 2    levothyroxine (SYNTHROID) 150 mcg tablet Taking one pill 6 days per week (Patient taking differently: 150 mcg.  Taking one pill 6 days per week) 30 Tab 4     Allergies: Effexor [venlafaxine]; Gluten; Levaquin [levofloxacin]; Lisinopril; Metformin; and Other medication   Social History     Social History    Marital status: SINGLE     Spouse name: N/A    Number of children: 0    Years of education: N/A     Occupational History    Not on file.      Social History Main Topics    Smoking status: Never Smoker    Smokeless tobacco: Never Used    Alcohol use No    Drug use: No    Sexual activity: Not Currently     Other Topics Concern    Not on file     Social History Narrative     Patient Active Problem List   Diagnosis Code    HTN (hypertension) I10    Hypothyroidism E03.9    AR (allergic rhinitis) J30.9    Impaired fasting glucose R73.01    Biliary dyskinesia K82.8    Endometriosis N80.9    Hyperlipidemia with target LDL less than 130 E78.5    VSD (ventricular septal defect) Q21.0    Menopausal disorder N95.9    Depression F32.9    Gluten intolerance K90.0    Vitamin D deficiency E55.9    MR (mitral regurgitation) I34.0    Sebaceous cyst of skin of breast N60.89    Sebaceous cyst of breast N60.89    Skin abnormalities L98.9    Environmental allergies Z91.09       Review of Systems - History obtained from the patient  Constitutional: negative for weight loss, fever, night sweats  HEENT: negative for hearing loss, earache, congestion, snoring, sorethroat  CV: negative for chest pain, palpitations, edema  Resp: negative for cough, shortness of breath, wheezing  GI: negative for change in bowel habits, abdominal pain, black or bloody stools  : negative for frequency, dysuria, hematuria, vaginal discharge  MSK: negative for back pain, joint pain, muscle pain  Breast: negative for breast lumps, nipple discharge, galactorrhea  Skin :negative for itching, rash, hives  Neuro: negative for dizziness, headache, confusion, weakness  Psych: negative for anxiety, depression, change in mood  Heme/lymph: negative for bleeding, bruising, pallor    Physical Exam    Visit Vitals    BP (!) 154/105 (BP 1 Location: Left arm, BP Patient Position: Sitting)    Pulse 88    Resp 19    Ht 5' 7\" (1.702 m)    Wt 304 lb (137.9 kg)    LMP 08/20/2000    BMI 47.61 kg/m2     Constitutional  · Appearance: well-nourished, well developed, alert, in no acute distress    HENT  · Head and Face: appears normal    Neck  · Inspection/Palpation: normal appearance, no masses or tenderness  Lymph Nodes: no lymphadenopathy present    Chest  · Respiratory Effort: breathing normal    Breasts  · Inspection of Breasts: breasts symmetrical, no skin changes, no discharge present, nipple appearance normal, no skin retraction present  · Palpation of Breasts and Axillae: no masses present on palpation, no breast tenderness  · Axillary Lymph Nodes: no lymphadenopathy present    Gastrointestinal  · Abdominal Examination: abdomen non-tender to palpation, normal bowel sounds, no masses present  · Liver and spleen: no hepatomegaly present, spleen not palpable  · Hernias: no hernias identified    Genitourinary  · External Genitalia: normal appearance for age, no discharge present, no tenderness present, no inflammatory lesions present, no masses present, no atrophy present  · Vagina: normal vaginal vault without central or paravaginal defects, no discharge present, no inflammatory lesions present, no masses present  · Bladder: non-tender to palpation  · Urethra: appears normal  · Cervix: normal  · Uterus: absent  · Adnexa: no adnexal tenderness present, no adnexal masses present  · Perineum: perineum within normal limits, no evidence of trauma, no rashes or skin lesions present  · Anus: anus within normal limits, no hemorrhoids present  · Inguinal Lymph Nodes: no lymphadenopathy present      Skin  · General Inspection: no rash, no lesions identified    Neurologic/Psychiatric  · Mental Status:  · Orientation: grossly oriented to person, place and time  · Mood and Affect: mood normal, affect appropriate    .   Assessment:  Routine gynecologic examination  Her current medical status is satisfactory with no evidence of significant gynecologic issues.     Plan:  Counseled re: diet, exercise, healthy lifestyle  Return for yearly wellness visits  Rec annual mammogram  Patient Verbalized understanding  STD testing sent  To see PCP for BP

## 2017-09-17 LAB
COMMENT, 144067: NORMAL
HBV SURFACE AG SERPL QL IA: NEGATIVE
HCV AB S/CO SERPL IA: <0.1 S/CO RATIO (ref 0–0.9)
HIV 1+2 AB+HIV1 P24 AG SERPL QL IA: NON REACTIVE
HSV1 IGG SER IA-ACNC: 24.4 INDEX (ref 0–0.9)
HSV2 IGG SER IA-ACNC: <0.91 INDEX (ref 0–0.9)
RPR SER QL: REACTIVE
RPR SER-TITR: ABNORMAL {TITER}

## 2017-09-19 LAB
C TRACH RRNA SPEC QL NAA+PROBE: NEGATIVE
N GONORRHOEA RRNA SPEC QL NAA+PROBE: NEGATIVE

## 2017-09-21 LAB
SPECIMEN STATUS REPORT, ROLRST: NORMAL
T PALLIDUM AB SER QL IF: NON REACTIVE

## 2017-10-05 RX ORDER — DICLOFENAC SODIUM 75 MG/1
TABLET, DELAYED RELEASE ORAL
Qty: 60 TAB | Refills: 2 | Status: SHIPPED | OUTPATIENT
Start: 2017-10-05 | End: 2017-11-02 | Stop reason: ALTCHOICE

## 2017-10-25 RX ORDER — HYDROCHLOROTHIAZIDE 25 MG/1
TABLET ORAL
Qty: 30 TAB | Refills: 5 | Status: SHIPPED | OUTPATIENT
Start: 2017-10-25 | End: 2018-04-23 | Stop reason: SDUPTHER

## 2017-11-02 ENCOUNTER — OFFICE VISIT (OUTPATIENT)
Dept: INTERNAL MEDICINE CLINIC | Age: 57
End: 2017-11-02

## 2017-11-02 VITALS
DIASTOLIC BLOOD PRESSURE: 65 MMHG | WEIGHT: 293 LBS | HEIGHT: 67 IN | HEART RATE: 92 BPM | BODY MASS INDEX: 45.99 KG/M2 | SYSTOLIC BLOOD PRESSURE: 119 MMHG | TEMPERATURE: 98.1 F | RESPIRATION RATE: 12 BRPM

## 2017-11-02 DIAGNOSIS — I10 ESSENTIAL HYPERTENSION: ICD-10-CM

## 2017-11-02 DIAGNOSIS — K43.9 ABDOMINAL WALL HERNIA: Primary | ICD-10-CM

## 2017-11-02 DIAGNOSIS — E78.5 HYPERLIPIDEMIA WITH TARGET LDL LESS THAN 130: ICD-10-CM

## 2017-11-02 DIAGNOSIS — E55.9 VITAMIN D DEFICIENCY: ICD-10-CM

## 2017-11-02 RX ORDER — DICLOFENAC SODIUM AND MISOPROSTOL 75; 200 MG/1; UG/1
TABLET, DELAYED RELEASE ORAL
Refills: 2 | COMMUNITY
Start: 2017-10-23 | End: 2018-01-24 | Stop reason: SDUPTHER

## 2017-11-02 RX ORDER — VALSARTAN 160 MG/1
80 TABLET ORAL DAILY
Qty: 30 TAB | Refills: 3
Start: 2017-11-02 | End: 2018-11-01

## 2017-11-02 RX ORDER — VALSARTAN 160 MG/1
TABLET ORAL DAILY
COMMUNITY
End: 2017-11-02 | Stop reason: SDUPTHER

## 2017-11-02 RX ORDER — LEVOTHYROXINE SODIUM 150 MCG
150 TABLET ORAL
Refills: 6 | COMMUNITY
Start: 2017-09-29 | End: 2021-03-23 | Stop reason: DRUGHIGH

## 2017-11-02 NOTE — MR AVS SNAPSHOT
Visit Information Date & Time Provider Department Dept. Phone Encounter #  
 11/2/2017  8:15 AM Laura Wesley MD [facility] [address] [id number] Upcoming Health Maintenance Date Due  
 PAP AKA CERVICAL CYTOLOGY 5/7/2018 BREAST CANCER SCRN MAMMOGRAM 9/15/2019 DTaP/Tdap/Td series (2 - Td) 8/31/2021 COLONOSCOPY 7/29/2025 Allergies as of 11/2/2017  Review Complete On: 9/15/2017 By: Suzan Bustos MD  
  
 Severity Noted Reaction Type Reactions Effexor [Venlafaxine]  03/05/2009    Other (comments) Htn,nose bleeding,increase in anxiety Gluten  02/24/2016    Other (comments) Irritable bowel symptoms, bloating Levaquin [Levofloxacin]  02/22/2010   Side Effect Myalgia Per pt muscle weakness Lisinopril  08/31/2012    Diarrhea Metformin  08/12/2014    Diarrhea Other Medication  10/07/2010   Side Effect Swelling Family of grains Current Immunizations  Reviewed on 11/2/2017 Name Date Influenza Vaccine 8/1/2017, 8/24/2016 Influenza Vaccine Whole 9/21/2010 TD Vaccine 3/5/2006 TDAP Vaccine 8/31/2011 Reviewed by Joyce Greene on 11/2/2017 at  8:22 AM  
You Were Diagnosed With   
  
 Codes Comments Abdominal wall hernia    -  Primary ICD-10-CM: K43.9 ICD-9-CM: 553.20 Essential hypertension     ICD-10-CM: I10 
ICD-9-CM: 401.9 Hyperlipidemia with target LDL less than 130     ICD-10-CM: E78.5 ICD-9-CM: 272.4 Vitamin D deficiency     ICD-10-CM: E55.9 ICD-9-CM: 268.9 Vitals BP Pulse Temp Resp Height(growth percentile) Weight(growth percentile)  
 119/65 (BP 1 Location: Left arm, BP Patient Position: Sitting) 92 98.1 °F (36.7 °C) 12 5' 7\" (1.702 m) 306 lb (138.8 kg) LMP BMI OB Status Smoking Status 08/20/2000 47.93 kg/m2 Hysterectomy Never Smoker Vitals History BMI and BSA Data Body Mass Index Body Surface Area  
 47.93 kg/m 2 2.56 m 2 Preferred Pharmacy Pharmacy Name Phone [address] [place] [phone] Your Updated Medication List  
  
   
This list is accurate as of: 11/2/17  8:51 AM.  Always use your most recent med list.  
  
  
  
  
 diclofenac-miSOPROStol  mg-mcg per tablet Commonly known as:  ARTHROTEC 75 TAKE 1 TABLET BY MOUTH TWICE A DAY  
  
 estradiol 1 mg tablet Commonly known as:  ESTRACE  
TAKE 1 TABLET BY MOUTH EVERY DAY  
  
 hydroCHLOROthiazide 25 mg tablet Commonly known as:  HYDRODIURIL  
TAKE 1 TABLET BY MOUTH EVERY DAY FOR HIGH BLOOD PRESSURE SYNTHROID 150 mcg tablet Generic drug:  levothyroxine TAKE ONE TABLET BY MOUTH ONE TIME DAILY  
  
 valsartan 160 mg tablet Commonly known as:  DIOVAN Take 0.5 Tabs by mouth daily. Decreased dose 11/2/17 VITAMIN B COMPLEX PO Take  by mouth. VITAMIN C PO Take  by mouth. VITAMIN D3 4,000 unit Cap Generic drug:  cholecalciferol (vitamin D3) Take  by mouth. ZINC PO Take  by mouth. We Performed the Following LIPID PANEL [01868 CPT(R)] METABOLIC PANEL, COMPREHENSIVE [90620 CPT(R)] REFERRAL TO GENERAL SURGERY [REF27 Custom] VITAMIN D, 25 HYDROXY [id number] CPT(R)] Referral Information Referral ID Referred By Referred To  
  
 [id number] Demetria BLANK MD [address] [place], [address] [place] [address] Pkwy Phone: [phone] Fax: [phone] Visits Status Start Date End Date 1 New Request 11/2/17 11/2/18 If your referral has a status of pending review or denied, additional information will be sent to support the outcome of this decision. Introducing [facility]! Dear Isabella Aguilera: Thank you for requesting a [employer] account. Our records indicate that you already have an active [employer] account. You can access your account anytime at [email or web address] Did you know that you can access your hospital and ER discharge instructions at any time in [facility]? You can also review all of your test results from your hospital stay or ER visit. Additional Information If you have questions, please visit the Frequently Asked Questions section of the [employer] website at [email or web address] Remember, [employer] is NOT to be used for urgent needs. For medical emergencies, dial 911. Now available from your iPhone and Android! Please provide this summary of care documentation to your next provider. Your primary care clinician is listed as Laura Wesley. If you have any questions after today's visit, please call [phone].

## 2017-11-02 NOTE — PROGRESS NOTES
HISTORY OF PRESENT ILLNESS    Chief Complaint   Patient presents with    Hypertension       Presents for follow-up    Hypertension- was off valsartan , but BP was high 2 weeks ago, 150/98, face was red. Taking 160 mg now. Taking HCTZ  Had been taking voltaren-misoprostil for shoulder  Hypertension ROS: taking medications as instructed, no medication side effects noted, no TIA's, no chest pain on exertion, no dyspnea on exertion, no swelling of ankles     reports that she has never smoked. She has never used smokeless tobacco.    reports that she does not drink alcohol. BP Readings from Last 2 Encounters:   11/02/17 119/65   09/15/17 (!) 154/105     Had right shoulder injection w Dr. Emmanuel Mora last week which helped a lot. It was severely painful    Reports mid-abdominal bulging when she eats. It is uncomfortable when swollen. Eating well. Hx cholecystectomy. Normal BMs. No n/v.  EGD 7/2015 w gastritis. Review of Systems   All other systems reviewed and are negative, except as noted in HPI    Past Medical and Surgical History   has a past medical history of AR (allergic rhinitis); Biliary dyskinesia; Depression; Endometriosis; Environmental allergies (10/2010); Gluten intolerance; HTN (hypertension); Hyperlipidemia LDL goal < 130; Hypothyroidism; Impaired fasting glucose; Menopausal disorder; MR (mitral regurgitation); Sebaceous cyst of breast; Skin abnormalities (8/2010); Vitamin D deficiency; and VSD (ventricular septal defect). has a past surgical history that includes susie and bso (10/2008); tonsillectomy; femur/knee surg unlisted (12/30/2010); colonoscopy (9/2009); lap cholecystectomy (8/01/2014); colonoscopy (07/29/2015); endoscopy (07/29/2015); and cyst removal (1983). reports that she has never smoked. She has never used smokeless tobacco. She reports that she does not drink alcohol or use illicit drugs.   family history includes Arthritis-rheumatoid in her paternal grandmother; Cancer in her mother; Deep Vein Thrombosis in her father; Diabetes in her maternal grandmother; Heart Attack in her paternal grandfather; Heart Disease in her father and paternal grandfather; High Cholesterol in her father and paternal grandfather; Osteoporosis in her maternal aunt; Stroke in her mother; Thyroid Disease in her paternal grandmother. Physical Exam   Nursing note and vitals reviewed. Blood pressure 119/65, pulse 92, temperature 98.1 °F (36.7 °C), resp. rate 12, height 5' 7\" (1.702 m), weight 306 lb (138.8 kg), last menstrual period 08/20/2000. Constitutional:  No distress. Eyes: Conjunctivae are normal.   Ears:  Hearing grossly intact  Cardiovascular: Normal rate. regular rhythm, no murmurs or gallops  No edema  Pulmonary/Chest: Effort normal.   CTAB  Musculoskeletal: moves all 4 extremities   abd - soft 5 cm hernia 4 cm above umbilicus, slightly left  Neurological: Alert and oriented to person, place, and time. Skin: No rash noted. Psychiatric: Normal mood and affect. Behavior is normal.     ASSESSMENT and PLAN  Diagnoses and all orders for this visit:    1. Abdominal wall hernia- large size. Consult surgeon. Educated patient regarding signs and symptoms of an incarcerated hernia. Seek immediate medical care if in severe pain, hernia \"stuck\",or not reducible. -     REFERRAL TO GENERAL SURGERY    2. Essential hypertension- over-controlled. -     valsartan (DIOVAN) 160 mg tablet; Take 0.5 Tabs by mouth daily. Decreased dose 51/2/37  -     METABOLIC PANEL, COMPREHENSIVE    3. Hyperlipidemia with target LDL less than 130- The patient is asked to make an attempt to improve diet and exercise patterns to aid in medical management of this problem.   -     LIPID PANEL    4. Vitamin D deficiency  -     VITAMIN D, 25 HYDROXY        There are no Patient Instructions on file for this visit.    lab results and schedule of future lab studies reviewed with patient  reviewed medications and side effects in detail    Return to clinic for further evaluation if new symptoms develop    Follow-up Disposition: Not on File    Current Outpatient Prescriptions   Medication Sig    diclofenac-miSOPROStol (ARTHROTEC 75)  mg-mcg per tablet TAKE 1 TABLET BY MOUTH TWICE A DAY    SYNTHROID 150 mcg tablet TAKE ONE TABLET BY MOUTH ONE TIME DAILY    ZINC PO Take  by mouth.  valsartan (DIOVAN) 160 mg tablet Take 0.5 Tabs by mouth daily. Decreased dose 11/2/17    hydroCHLOROthiazide (HYDRODIURIL) 25 mg tablet TAKE 1 TABLET BY MOUTH EVERY DAY FOR HIGH BLOOD PRESSURE    estradiol (ESTRACE) 1 mg tablet TAKE 1 TABLET BY MOUTH EVERY DAY    ASCORBATE CALCIUM (VITAMIN C PO) Take  by mouth.  cholecalciferol, vitamin D3, (VITAMIN D3) 4,000 unit cap Take  by mouth.  VITAMIN B COMPLEX PO Take  by mouth. No current facility-administered medications for this visit.

## 2017-11-03 LAB
25(OH)D3+25(OH)D2 SERPL-MCNC: 53.2 NG/ML (ref 30–100)
ALBUMIN SERPL-MCNC: 4.4 G/DL (ref 3.5–5.5)
ALBUMIN/GLOB SERPL: 2 {RATIO} (ref 1.2–2.2)
ALP SERPL-CCNC: 93 IU/L (ref 39–117)
ALT SERPL-CCNC: 26 IU/L (ref 0–32)
AST SERPL-CCNC: 17 IU/L (ref 0–40)
BILIRUB SERPL-MCNC: 0.3 MG/DL (ref 0–1.2)
BUN SERPL-MCNC: 13 MG/DL (ref 6–24)
BUN/CREAT SERPL: 21 (ref 9–23)
CALCIUM SERPL-MCNC: 9.7 MG/DL (ref 8.7–10.2)
CHLORIDE SERPL-SCNC: 98 MMOL/L (ref 96–106)
CHOLEST SERPL-MCNC: 202 MG/DL (ref 100–199)
CO2 SERPL-SCNC: 26 MMOL/L (ref 18–29)
CREAT SERPL-MCNC: 0.63 MG/DL (ref 0.57–1)
GFR SERPLBLD CREATININE-BSD FMLA CKD-EPI: 101 ML/MIN/1.73
GFR SERPLBLD CREATININE-BSD FMLA CKD-EPI: 117 ML/MIN/1.73
GLOBULIN SER CALC-MCNC: 2.2 G/DL (ref 1.5–4.5)
GLUCOSE SERPL-MCNC: 107 MG/DL (ref 65–99)
HDLC SERPL-MCNC: 55 MG/DL
INTERPRETATION, 910389: NORMAL
LDLC SERPL CALC-MCNC: 130 MG/DL (ref 0–99)
POTASSIUM SERPL-SCNC: 4 MMOL/L (ref 3.5–5.2)
PROT SERPL-MCNC: 6.6 G/DL (ref 6–8.5)
SODIUM SERPL-SCNC: 144 MMOL/L (ref 134–144)
TRIGL SERPL-MCNC: 85 MG/DL (ref 0–149)
VLDLC SERPL CALC-MCNC: 17 MG/DL (ref 5–40)

## 2018-01-03 ENCOUNTER — OFFICE VISIT (OUTPATIENT)
Dept: INTERNAL MEDICINE CLINIC | Age: 58
End: 2018-01-03

## 2018-01-03 ENCOUNTER — TELEPHONE (OUTPATIENT)
Dept: INTERNAL MEDICINE CLINIC | Age: 58
End: 2018-01-03

## 2018-01-03 VITALS
OXYGEN SATURATION: 98 % | DIASTOLIC BLOOD PRESSURE: 90 MMHG | SYSTOLIC BLOOD PRESSURE: 142 MMHG | RESPIRATION RATE: 12 BRPM | HEIGHT: 67 IN | BODY MASS INDEX: 45.99 KG/M2 | TEMPERATURE: 98.4 F | HEART RATE: 95 BPM | WEIGHT: 293 LBS

## 2018-01-03 DIAGNOSIS — J40 BRONCHITIS: ICD-10-CM

## 2018-01-03 DIAGNOSIS — J45.21 MILD INTERMITTENT REACTIVE AIRWAY DISEASE WITH ACUTE EXACERBATION: ICD-10-CM

## 2018-01-03 DIAGNOSIS — J01.00 ACUTE MAXILLARY SINUSITIS, RECURRENCE NOT SPECIFIED: Primary | ICD-10-CM

## 2018-01-03 DIAGNOSIS — R05.9 COUGH: ICD-10-CM

## 2018-01-03 RX ORDER — LIOTHYRONINE SODIUM 5 UG/1
TABLET ORAL
Refills: 6 | COMMUNITY
Start: 2017-12-18 | End: 2018-11-01 | Stop reason: ALTCHOICE

## 2018-01-03 RX ORDER — PREDNISONE 20 MG/1
20 TABLET ORAL 2 TIMES DAILY
Qty: 10 TAB | Refills: 0 | Status: SHIPPED | OUTPATIENT
Start: 2018-01-03 | End: 2018-01-26 | Stop reason: ALTCHOICE

## 2018-01-03 RX ORDER — FLUCONAZOLE 150 MG/1
150 TABLET ORAL DAILY
Qty: 2 TAB | Refills: 0 | Status: SHIPPED | OUTPATIENT
Start: 2018-01-03 | End: 2018-01-04

## 2018-01-03 RX ORDER — CLARITHROMYCIN 500 MG/1
500 TABLET, FILM COATED ORAL 2 TIMES DAILY
Qty: 20 TAB | Refills: 0 | Status: SHIPPED | OUTPATIENT
Start: 2018-01-03 | End: 2018-01-26 | Stop reason: ALTCHOICE

## 2018-01-03 RX ORDER — CODEINE PHOSPHATE AND GUAIFENESIN 10; 100 MG/5ML; MG/5ML
5 SOLUTION ORAL
Qty: 118 ML | Refills: 0 | Status: SHIPPED | OUTPATIENT
Start: 2018-01-03 | End: 2018-01-26 | Stop reason: ALTCHOICE

## 2018-01-03 RX ORDER — LEVALBUTEROL INHALATION SOLUTION 1.25 MG/3ML
1.25 SOLUTION RESPIRATORY (INHALATION)
Qty: 3 ML | Refills: 0
Start: 2018-01-03 | End: 2018-01-03

## 2018-01-03 RX ORDER — ALBUTEROL SULFATE 90 UG/1
2 AEROSOL, METERED RESPIRATORY (INHALATION)
Qty: 1 INHALER | Refills: 2 | Status: SHIPPED | OUTPATIENT
Start: 2018-01-03 | End: 2018-11-01 | Stop reason: ALTCHOICE

## 2018-01-03 NOTE — TELEPHONE ENCOUNTER
I spoke to pharmacist, he states their is significant drug interaction with Biaxin and Diflucan, it can cause heart arrhythmia. Per NP, pt was advise to take the Diflucan AFTER she finishes the Biaxin, IF she develops a yeast infection. Pt was given a paper script to fill after she finishes abx. I called pharmacy, pharmacist verbalized understanding.

## 2018-01-03 NOTE — LETTER
NOTIFICATION RETURN TO WORK / SCHOOL 
 
1/3/2018 10:35 AM 
 
Ms. Moises Rojas [facility] [phone] To Whom It May Concern: 
 
Moises Rojas is currently under the care of [address] [place] [address],[address]. Was seen in office today for medical illness and was advised to remain out of work due to illness. She will return to work/school on: 1/6/2018 If there are questions or concerns please have the patient contact our office.  
 
 
 
Sincerely, 
 
 
Kleber Avalos NP

## 2018-01-03 NOTE — MR AVS SNAPSHOT
Visit Information Date & Time Provider Department Dept. Phone Encounter #  
 1/3/2018  9:40 AM Eryn Bhatt NP [facility] [address] [id number] Upcoming Health Maintenance Date Due  
 PAP AKA CERVICAL CYTOLOGY 5/7/2018 BREAST CANCER SCRN MAMMOGRAM 9/15/2019 DTaP/Tdap/Td series (2 - Td) 8/31/2021 COLONOSCOPY 7/29/2025 Allergies as of 1/3/2018  Review Complete On: 1/3/2018 By: Eryn Bhatt NP Severity Noted Reaction Type Reactions Effexor [Venlafaxine]  03/05/2009    Other (comments) Htn,nose bleeding,increase in anxiety Gluten  02/24/2016    Other (comments) Irritable bowel symptoms, bloating Levaquin [Levofloxacin]  02/22/2010   Side Effect Myalgia Per pt muscle weakness Lisinopril  08/31/2012    Diarrhea Metformin  08/12/2014    Diarrhea Other Medication  10/07/2010   Side Effect Swelling Family of grains Current Immunizations  Reviewed on 11/2/2017 Name Date Influenza Vaccine 8/1/2017, 8/24/2016 Influenza Vaccine Whole 9/21/2010 TD Vaccine 3/5/2006 TDAP Vaccine 8/31/2011 Not reviewed this visit You Were Diagnosed With   
  
 Codes Comments Acute maxillary sinusitis, recurrence not specified    -  Primary ICD-10-CM: J01.00 ICD-9-CM: 461.0 Bronchitis     ICD-10-CM: J40 ICD-9-CM: 466 Mild intermittent reactive airway disease with acute exacerbation     ICD-10-CM: J45.21 ICD-9-CM: 342.69 Cough     ICD-10-CM: R05 ICD-9-CM: 205. 2 Vitals BP Pulse Temp Resp Height(growth percentile) Weight(growth percentile) 142/90 (BP 1 Location: Left arm, BP Patient Position: Sitting) 95 98.4 °F (36.9 °C) (Oral) 12 5' 7\" (1.702 m) 307 lb 6.4 oz (139.4 kg) LMP SpO2 BMI OB Status Smoking Status 08/20/2000 98% 48.15 kg/m2 Hysterectomy Never Smoker Vitals History BMI and BSA Data  Body Mass Index Body Surface Area  
 48.15 kg/m 2 2.57 m 2  
  
  
 Preferred Pharmacy Pharmacy Name Phone [facility] [place] Joss Thompson [phone] Your Updated Medication List  
  
   
This list is accurate as of: 1/3/18 10:37 AM.  Always use your most recent med list.  
  
  
  
  
 albuterol 90 mcg/actuation inhaler Commonly known as:  PROVENTIL HFA, VENTOLIN HFA, PROAIR HFA Take 2 Puffs by inhalation every four (4) hours as needed for Wheezing. clarithromycin 500 mg tablet Commonly known as:  Joan Erik Take 1 Tab by mouth two (2) times a day. diclofenac-miSOPROStol  mg-mcg per tablet Commonly known as:  ARTHROTEC 75 TAKE 1 TABLET BY MOUTH TWICE A DAY  
  
 estradiol 1 mg tablet Commonly known as:  ESTRACE  
TAKE 1 TABLET BY MOUTH EVERY DAY  
  
 fluconazole 150 mg tablet Commonly known as:  DIFLUCAN Take 1 Tab by mouth daily for 1 day. May repeat in 4 days with 2nd tab if needed  
  
 guaiFENesin-codeine 100-10 mg/5 mL solution Commonly known as:  ROBITUSSIN AC Take 5 mL by mouth three (3) times daily as needed for Cough. Max Daily Amount: 15 mL.  
  
 guaiFENesin-dextromethorphan -30 mg per tablet Commonly known as:  [place] DM Take 1 Tab by mouth every twelve (12) hours as needed for Cough. hydroCHLOROthiazide 25 mg tablet Commonly known as:  HYDRODIURIL  
TAKE 1 TABLET BY MOUTH EVERY DAY FOR HIGH BLOOD PRESSURE  
  
 levalbuterol 1.25 mg/3 mL Nebu Commonly known as:  XOPENEX  
3 mL by Nebulization route now for 1 dose. liothyronine 5 mcg tablet Commonly known as:  CYTOMEL  
TAKE HALF TABLET BY MOUTH TWICE A DAY AS DIRECTED  
  
 MUCINEX PO Take  by mouth.  
  
 predniSONE 20 mg tablet Commonly known as:  Chris Mellow Take 1 Tab by mouth two (2) times a day. SYNTHROID 150 mcg tablet Generic drug:  levothyroxine TAKE ONE TABLET BY MOUTH ONE TIME DAILY  
  
 valsartan 160 mg tablet Commonly known as:  DIOVAN  
 Take 0.5 Tabs by mouth daily. Decreased dose 11/2/17 VITAMIN B COMPLEX PO Take  by mouth. VITAMIN C PO Take  by mouth. VITAMIN D3 4,000 unit Cap Generic drug:  cholecalciferol (vitamin D3) Take  by mouth. ZINC PO Take  by mouth. Prescriptions Printed Refills  
 guaiFENesin-codeine (ROBITUSSIN AC) 100-10 mg/5 mL solution 0 Sig: Take 5 mL by mouth three (3) times daily as needed for Cough. Max Daily Amount: 15 mL. Class: Print Route: Oral  
 fluconazole (DIFLUCAN) 150 mg tablet 0 Sig: Take 1 Tab by mouth daily for 1 day. May repeat in 4 days with 2nd tab if needed Class: Print Route: Oral  
  
Prescriptions Sent to Pharmacy Refills  
 clarithromycin (BIAXIN) 500 mg tablet 0 Sig: Take 1 Tab by mouth two (2) times a day. Class: Normal  
 Pharmacy: [address] Ph #: [phone] Route: Oral  
 predniSONE (DELTASONE) 20 mg tablet 0 Sig: Take 1 Tab by mouth two (2) times a day. Class: Normal  
 Pharmacy: [address] Ph #: [phone] Route: Oral  
 albuterol (PROVENTIL HFA, VENTOLIN HFA, PROAIR HFA) 90 mcg/actuation inhaler 2 Sig: Take 2 Puffs by inhalation every four (4) hours as needed for Wheezing. Class: Normal  
 Pharmacy: [address] Ph #: [phone] Route: Inhalation We Performed the Following LEVALBUTEROL, INHAL. SOL., FDA-APPROVED FINAL, NON-COMPOUND UNIT DOSE, 0.5 MG [ [facility]] [place] INHAL RX, AIRWAY OBST/DX SPUTUM INDUCT [id number] CPT(R)] Patient Instructions Bronchitis: Care Instructions Your Care Instructions Bronchitis is inflammation of the bronchial tubes, which carry air to the lungs. The tubes swell and produce mucus, or phlegm. The mucus and inflamed bronchial tubes make you cough. You may have trouble breathing. Most cases of bronchitis are caused by viruses like those that cause colds. Antibiotics usually do not help and they may be harmful. Bronchitis usually develops rapidly and lasts about 2 to 3 weeks in otherwise healthy people. Follow-up care is a key part of your treatment and safety. Be sure to make and go to all appointments, and call your doctor if you are having problems. It's also a good idea to know your test results and keep a list of the medicines you take. How can you care for yourself at home? · Take all medicines exactly as prescribed. Call your doctor if you think you are having a problem with your medicine. · Get some extra rest. 
· Take an over-the-counter pain medicine, such as acetaminophen (Tylenol), ibuprofen (Advil, Motrin), or naproxen (Aleve) to reduce fever and relieve body aches. Read and follow all instructions on the label. · Do not take two or more pain medicines at the same time unless the doctor told you to. Many pain medicines have acetaminophen, which is Tylenol. Too much acetaminophen (Tylenol) can be harmful. · Take an over-the-counter cough medicine that contains dextromethorphan to help quiet a dry, hacking cough so that you can sleep. Avoid cough medicines that have more than one active ingredient. Read and follow all instructions on the label. · Breathe moist air from a humidifier, hot shower, or sink filled with hot water. The heat and moisture will thin mucus so you can cough it out. · Do not smoke. Smoking can make bronchitis worse. If you need help quitting, talk to your doctor about stop-smoking programs and medicines. These can increase your chances of quitting for good. When should you call for help? Call 911 anytime you think you may need emergency care. For example, call if: 
? · You have severe trouble breathing. ?Call your doctor now or seek immediate medical care if: 
? · You have new or worse trouble breathing. ? · You cough up dark brown or bloody mucus (sputum). ? · You have a new or higher fever. ? · You have a new rash. ? Watch closely for changes in your health, and be sure to contact your doctor if: 
? · You cough more deeply or more often, especially if you notice more mucus or a change in the color of your mucus. ? · You are not getting better as expected. Where can you learn more? Go to http://lonnie-parrish.info/. Enter H333 in the search box to learn more about \"Bronchitis: Care Instructions. \" Current as of: May 12, 2017 Content Version: 11.4 © 8033-1031 [employer]. Care instructions adapted under license by [employer] (which disclaims liability or warranty for this information). If you have questions about a medical condition or this instruction, always ask your healthcare professional. [place] [address] any warranty or liability for your use of this information. Sinusitis: Care Instructions Your Care Instructions Sinusitis is an infection of the lining of the sinus cavities in your head. Sinusitis often follows a cold. It causes pain and pressure in your head and face. In most cases, sinusitis gets better on its own in 1 to 2 weeks. But some mild symptoms may last for several weeks. Sometimes antibiotics are needed. Follow-up care is a key part of your treatment and safety. Be sure to make and go to all appointments, and call your doctor if you are having problems. It's also a good idea to know your test results and keep a list of the medicines you take. How can you care for yourself at home? · Take an over-the-counter pain medicine, such as acetaminophen (Tylenol), ibuprofen (Advil, Motrin), or naproxen (Aleve). Read and follow all instructions on the label. · If the doctor prescribed antibiotics, take them as directed. Do not stop taking them just because you feel better. You need to take the full course of antibiotics. · Be careful when taking over-the-counter cold or flu medicines and Tylenol at the same time. Many of these medicines have acetaminophen, which is Tylenol. Read the labels to make sure that you are not taking more than the recommended dose. Too much acetaminophen (Tylenol) can be harmful. · Breathe warm, moist air from a steamy shower, a hot bath, or a sink filled with hot water. Avoid cold, dry air. Using a humidifier in your home may help. Follow the directions for cleaning the machine. · Use saline (saltwater) nasal washes to help keep your nasal passages open and wash out mucus and bacteria. You can buy saline nose drops at a grocery store or drugstore. Or you can make your own at home by adding 1 teaspoon of salt and 1 teaspoon of baking soda to 2 cups of distilled water. If you make your own, fill a bulb syringe with the solution, insert the tip into your nostril, and squeeze gently. Adriana Ruff your nose. · Put a hot, wet towel or a warm gel pack on your face 3 or 4 times a day for 5 to 10 minutes each time. · Try a decongestant nasal spray like oxymetazoline (Afrin). Do not use it for more than 3 days in a row. Using it for more than 3 days can make your congestion worse. When should you call for help? Call your doctor now or seek immediate medical care if: 
? · You have new or worse swelling or redness in your face or around your eyes. ? · You have a new or higher fever. ? Watch closely for changes in your health, and be sure to contact your doctor if: 
? · You have new or worse facial pain. ? · The mucus from your nose becomes thicker (like pus) or has new blood in it. ? · You are not getting better as expected. Where can you learn more? Go to http://lonnie-parrish.info/. Enter C661 in the search box to learn more about \"Sinusitis: Care Instructions. \" Current as of: May 12, 2017 Content Version: 11.4 © 4859-9110 Healthwise, [email or web address].  Care instructions adapted under license by [address] (which disclaims liability or warranty for this information). If you have questions about a medical condition or this instruction, always ask your healthcare professional. [address] any warranty or liability for your use of this information. Introducing [facility]! Dear Billy Ramsey: Thank you for requesting a HipClub account. Our records indicate that you already have an active HipClub account. You can access your account anytime at [email or web address] Did you know that you can access your hospital and ER discharge instructions at any time in HipClub? You can also review all of your test results from your hospital stay or ER visit. Additional Information If you have questions, please visit the Frequently Asked Questions section of the HipClub website at [email or web address] Remember, HipClub is NOT to be used for urgent needs. For medical emergencies, dial 911. Now available from your iPhone and Android! Please provide this summary of care documentation to your next provider. Your primary care clinician is listed as Alannah Matthews. If you have any questions after today's visit, please call [phone].

## 2018-01-03 NOTE — PATIENT INSTRUCTIONS
Bronchitis: Care Instructions  Your Care Instructions    Bronchitis is inflammation of the bronchial tubes, which carry air to the lungs. The tubes swell and produce mucus, or phlegm. The mucus and inflamed bronchial tubes make you cough. You may have trouble breathing. Most cases of bronchitis are caused by viruses like those that cause colds. Antibiotics usually do not help and they may be harmful. Bronchitis usually develops rapidly and lasts about 2 to 3 weeks in otherwise healthy people. Follow-up care is a key part of your treatment and safety. Be sure to make and go to all appointments, and call your doctor if you are having problems. It's also a good idea to know your test results and keep a list of the medicines you take. How can you care for yourself at home? · Take all medicines exactly as prescribed. Call your doctor if you think you are having a problem with your medicine. · Get some extra rest.  · Take an over-the-counter pain medicine, such as acetaminophen (Tylenol), ibuprofen (Advil, Motrin), or naproxen (Aleve) to reduce fever and relieve body aches. Read and follow all instructions on the label. · Do not take two or more pain medicines at the same time unless the doctor told you to. Many pain medicines have acetaminophen, which is Tylenol. Too much acetaminophen (Tylenol) can be harmful. · Take an over-the-counter cough medicine that contains dextromethorphan to help quiet a dry, hacking cough so that you can sleep. Avoid cough medicines that have more than one active ingredient. Read and follow all instructions on the label. · Breathe moist air from a humidifier, hot shower, or sink filled with hot water. The heat and moisture will thin mucus so you can cough it out. · Do not smoke. Smoking can make bronchitis worse. If you need help quitting, talk to your doctor about stop-smoking programs and medicines. These can increase your chances of quitting for good.   When should you call for help? Call 911 anytime you think you may need emergency care. For example, call if:  ? · You have severe trouble breathing. ?Call your doctor now or seek immediate medical care if:  ? · You have new or worse trouble breathing. ? · You cough up dark brown or bloody mucus (sputum). ? · You have a new or higher fever. ? · You have a new rash. ? Watch closely for changes in your health, and be sure to contact your doctor if:  ? · You cough more deeply or more often, especially if you notice more mucus or a change in the color of your mucus. ? · You are not getting better as expected. Where can you learn more? Go to http://lonnie-parrish.info/. Enter H333 in the search box to learn more about \"Bronchitis: Care Instructions. \"  Current as of: May 12, 2017  Content Version: 11.4  © 0358-8536 [employer]. Care instructions adapted under license by [employer] (which disclaims liability or warranty for this information). If you have questions about a medical condition or this instruction, always ask your healthcare professional. [place] [address] any warranty or liability for your use of this information. Sinusitis: Care Instructions  Your Care Instructions    Sinusitis is an infection of the lining of the sinus cavities in your head. Sinusitis often follows a cold. It causes pain and pressure in your head and face. In most cases, sinusitis gets better on its own in 1 to 2 weeks. But some mild symptoms may last for several weeks. Sometimes antibiotics are needed. Follow-up care is a key part of your treatment and safety. Be sure to make and go to all appointments, and call your doctor if you are having problems. It's also a good idea to know your test results and keep a list of the medicines you take. How can you care for yourself at home?   · Take an over-the-counter pain medicine, such as acetaminophen (Tylenol), ibuprofen (Advil, Motrin), or naproxen (Aleve). Read and follow all instructions on the label. · If the doctor prescribed antibiotics, take them as directed. Do not stop taking them just because you feel better. You need to take the full course of antibiotics. · Be careful when taking over-the-counter cold or flu medicines and Tylenol at the same time. Many of these medicines have acetaminophen, which is Tylenol. Read the labels to make sure that you are not taking more than the recommended dose. Too much acetaminophen (Tylenol) can be harmful. · Breathe warm, moist air from a steamy shower, a hot bath, or a sink filled with hot water. Avoid cold, dry air. Using a humidifier in your home may help. Follow the directions for cleaning the machine. · Use saline (saltwater) nasal washes to help keep your nasal passages open and wash out mucus and bacteria. You can buy saline nose drops at a grocery store or drugstore. Or you can make your own at home by adding 1 teaspoon of salt and 1 teaspoon of baking soda to 2 cups of distilled water. If you make your own, fill a bulb syringe with the solution, insert the tip into your nostril, and squeeze gently. Sen Erum your nose. · Put a hot, wet towel or a warm gel pack on your face 3 or 4 times a day for 5 to 10 minutes each time. · Try a decongestant nasal spray like oxymetazoline (Afrin). Do not use it for more than 3 days in a row. Using it for more than 3 days can make your congestion worse. When should you call for help? Call your doctor now or seek immediate medical care if:  ? · You have new or worse swelling or redness in your face or around your eyes. ? · You have a new or higher fever. ? Watch closely for changes in your health, and be sure to contact your doctor if:  ? · You have new or worse facial pain. ? · The mucus from your nose becomes thicker (like pus) or has new blood in it. ? · You are not getting better as expected. Where can you learn more?   Go to http://lonnie-parrish.info/. Enter W262 in the search box to learn more about \"Sinusitis: Care Instructions. \"  Current as of: May 12, 2017  Content Version: 11.4  © 8558-0587 Healthwise, Hel[employer]ne. Care instructions adapted under license by [employer] (which disclaims liability or warranty for this information). If you have questions about a medical condition or this instruction, always ask your healthcare professional. [place] [address] any warranty or liability for your use of this information.

## 2018-01-03 NOTE — TELEPHONE ENCOUNTER
Pt is waiting on prescription. The pt has drug interactions with the medications given today and they would like a callback.  [phone]

## 2018-01-03 NOTE — PROGRESS NOTES
HISTORY OF PRESENT ILLNESS  Emily Helm is a 56 y.o. female. HPI  Upper respiratory illness:  Emily Helm presents with complaints of congestion, sore throat, post nasal drip, cough described as productive of thick yellow-green sputum, headache, bilateral sinus pain, chills, fevers up to 101 degrees, yellow-green nasal discharge and hoarseness for 6 days. no nausea and no vomiting . she has not had  Myalgias but has been feeling short of breath and tight in chest.  Symptoms are moderate. Over-the-counter remedies including Saline rinses and Mucinex DM   has been used with poor relief of symptoms. Drinking plenty of fluids: yes  Asthma?:  no  non-smoker  Contacts with similar infections: yes         Review of Systems   Constitutional: Positive for chills, fever and malaise/fatigue. HENT: Positive for congestion, sinus pain and sore throat. Negative for ear pain. Respiratory: Positive for cough, sputum production, shortness of breath and wheezing. Cardiovascular: Negative for chest pain and palpitations. Gastrointestinal: Negative for abdominal pain, nausea and vomiting. Genitourinary: Negative for dysuria and frequency. Musculoskeletal: Negative for myalgias. Neurological: Positive for headaches. Negative for dizziness and tingling. /90 (BP 1 Location: Left arm, BP Patient Position: Sitting)  Pulse 95  Temp 98.4 °F (36.9 °C) (Oral)   Resp 12  Ht 5' 7\" (1.702 m)  Wt 307 lb 6.4 oz (139.4 kg)  LMP 08/20/2000  SpO2 98%  BMI 48.15 kg/m2  Physical Exam   Constitutional: She is oriented to person, place, and time. She appears well-developed and well-nourished. HENT:   Head: Normocephalic and atraumatic. Right Ear: Tympanic membrane and external ear normal.   Left Ear: Tympanic membrane and external ear normal.   Nose: Mucosal edema present. Right sinus exhibits maxillary sinus tenderness. Left sinus exhibits maxillary sinus tenderness.    Mouth/Throat: Posterior oropharyngeal erythema present. No posterior oropharyngeal edema. Neck: Normal range of motion. Neck supple. No thyromegaly present. Cardiovascular: Normal rate, regular rhythm and normal heart sounds. Pulmonary/Chest: Effort normal. She has wheezes. Coarse rhonchi with diffuse wheezes   Lymphadenopathy:     She has no cervical adenopathy. Neurological: She is alert and oriented to person, place, and time. Skin: Skin is warm and dry. Psychiatric: She has a normal mood and affect. Her behavior is normal.   Nursing note and vitals reviewed. ASSESSMENT and PLAN  Diagnoses and all orders for this visit:    1. Acute maxillary sinusitis, recurrence not specified  -     clarithromycin (BIAXIN) 500 mg tablet; Take 1 Tab by mouth two (2) times a day. -     guaiFENesin-dextromethorphan SR (MUCINEX DM) 600-30 mg per tablet; Take 1 Tab by mouth every twelve (12) hours as needed for Cough. -     fluconazole (DIFLUCAN) 150 mg tablet; Take 1 Tab by mouth daily for 1 day. May repeat in 4 days with 2nd tab if needed    2. Bronchitis -- given Rx for Diflucan to take after course of Biaxin completed if develops symptoms of vaginal yeast infection. -     clarithromycin (BIAXIN) 500 mg tablet; Take 1 Tab by mouth two (2) times a day. -     guaiFENesin-dextromethorphan SR (MUCINEX DM) 600-30 mg per tablet; Take 1 Tab by mouth every twelve (12) hours as needed for Cough. -     fluconazole (DIFLUCAN) 150 mg tablet; Take 1 Tab by mouth daily for 1 day. May repeat in 4 days with 2nd tab if needed    3. Mild intermittent reactive airway disease with acute exacerbation -- Xopenex nebulizer treatment given in office with improvement of wheezes and air exchange. -     LEVALBUTEROL, INHAL. SOL., FDA-APPROVED FINAL, NON-COMPOUND UNIT DOSE, 0.5 MG  -     levalbuterol (XOPENEX) 1.25 mg/3 mL nebu; 3 mL by Nebulization route now for 1 dose. -     [place] INHAL RX, AIRWAY OBST/DX SPUTUM INDUCT  -     predniSONE (DELTASONE) 20 mg tablet;  Take 1 Tab by mouth two (2) times a day. -     albuterol (PROVENTIL HFA, VENTOLIN HFA, PROAIR HFA) 90 mcg/actuation inhaler; Take 2 Puffs by inhalation every four (4) hours as needed for Wheezing. 4. Cough  -     guaiFENesin-codeine (ROBITUSSIN AC) 100-10 mg/5 mL solution; Take 5 mL by mouth three (3) times daily as needed for Cough.  Max Daily Amount: 15 mL.      lab results and schedule of future lab studies reviewed with patient  reviewed diet, exercise and weight control  reviewed medications and side effects in detail

## 2018-01-24 RX ORDER — DICLOFENAC SODIUM AND MISOPROSTOL 75; 200 MG/1; UG/1
TABLET, DELAYED RELEASE ORAL
Qty: 60 TAB | Refills: 2 | Status: SHIPPED | OUTPATIENT
Start: 2018-01-24 | End: 2018-07-03 | Stop reason: ALTCHOICE

## 2018-01-26 ENCOUNTER — OFFICE VISIT (OUTPATIENT)
Dept: INTERNAL MEDICINE CLINIC | Age: 58
End: 2018-01-26

## 2018-01-26 VITALS
BODY MASS INDEX: 45.99 KG/M2 | WEIGHT: 293 LBS | HEIGHT: 67 IN | OXYGEN SATURATION: 98 % | HEART RATE: 89 BPM | DIASTOLIC BLOOD PRESSURE: 98 MMHG | TEMPERATURE: 98 F | RESPIRATION RATE: 16 BRPM | SYSTOLIC BLOOD PRESSURE: 159 MMHG

## 2018-01-26 DIAGNOSIS — I10 ESSENTIAL HYPERTENSION: ICD-10-CM

## 2018-01-26 DIAGNOSIS — J45.21 MILD INTERMITTENT REACTIVE AIRWAY DISEASE WITH ACUTE EXACERBATION: ICD-10-CM

## 2018-01-26 DIAGNOSIS — J01.01 ACUTE RECURRENT MAXILLARY SINUSITIS: Primary | ICD-10-CM

## 2018-01-26 RX ORDER — FLUTICASONE PROPIONATE AND SALMETEROL 250; 50 UG/1; UG/1
1 POWDER RESPIRATORY (INHALATION) 2 TIMES DAILY
Qty: 1 INHALER | Refills: 1 | Status: SHIPPED | OUTPATIENT
Start: 2018-01-26 | End: 2018-07-03 | Stop reason: SDUPTHER

## 2018-01-26 RX ORDER — AMOXICILLIN AND CLAVULANATE POTASSIUM 875; 125 MG/1; MG/1
1 TABLET, FILM COATED ORAL 2 TIMES DAILY
Qty: 20 TAB | Refills: 0 | Status: SHIPPED | OUTPATIENT
Start: 2018-01-26 | End: 2018-07-03 | Stop reason: SDUPTHER

## 2018-01-26 RX ORDER — GUAIFENESIN 600 MG/1
600 TABLET, EXTENDED RELEASE ORAL 2 TIMES DAILY
Qty: 20 TAB | Refills: 0
Start: 2018-01-26 | End: 2018-11-01 | Stop reason: ALTCHOICE

## 2018-01-26 NOTE — MR AVS SNAPSHOT
Dalila Bernal 
 
 
 [address] 
[phone] Patient: Nano Hennessy MRN: [id number] :1960 Visit Information Date & Time Provider Department Dept. Phone Encounter #  
 2018  9:40 AM Ian Hay NP [facility] [address] [id number] Upcoming Health Maintenance Date Due Pneumococcal 19-64 Medium Risk (1 of 1 - PPSV23) 10/13/1981 PAP AKA CERVICAL CYTOLOGY 2018 BREAST CANCER SCRN MAMMOGRAM 9/15/2019 DTaP/Tdap/Td series (2 - Td) 2021 COLONOSCOPY 2025 Allergies as of 2018  Review Complete On: 2018 By: Ian Hay NP Severity Noted Reaction Type Reactions Effexor [Venlafaxine]  2009    Other (comments) Htn,nose bleeding,increase in anxiety Gluten  2016    Other (comments) Irritable bowel symptoms, bloating Levaquin [Levofloxacin]  2010   Side Effect Myalgia Per pt muscle weakness Lisinopril  2012    Diarrhea Metformin  2014    Diarrhea Other Medication  10/07/2010   Side Effect Swelling Family of grains Current Immunizations  Reviewed on 2017 Name Date Influenza Vaccine 2017, 2016 Influenza Vaccine Whole 2010 TD Vaccine 3/5/2006 TDAP Vaccine 2011 Not reviewed this visit You Were Diagnosed With   
  
 Codes Comments Acute recurrent maxillary sinusitis    -  Primary ICD-10-CM: J01.01 
ICD-9-CM: 461.0 Mild intermittent reactive airway disease with acute exacerbation     ICD-10-CM: J45.21 ICD-9-CM: 836.01 Vitals BP Pulse Temp Resp Height(growth percentile) Weight(growth percentile) (!) 159/98 (BP 1 Location: Right arm, BP Patient Position: Sitting) 89 98 °F (36.7 °C) 16 5' 7\" (1.702 m) 307 lb (139.3 kg) LMP SpO2 BMI OB Status Smoking Status 2000 98% 48.08 kg/m2 Hysterectomy Never Smoker Vitals History BMI and BSA Data Body Mass Index Body Surface Area 48.08 kg/m 2 2.57 m 2 Preferred Pharmacy Pharmacy Name Phone [facility] [place] [phone] Your Updated Medication List  
  
   
This list is accurate as of: 1/26/18 10:17 AM.  Always use your most recent med list.  
  
  
  
  
 albuterol 90 mcg/actuation inhaler Commonly known as:  PROVENTIL HFA, VENTOLIN HFA, PROAIR HFA Take 2 Puffs by inhalation every four (4) hours as needed for Wheezing. amoxicillin-clavulanate 875-125 mg per tablet Commonly known as:  AUGMENTIN Take 1 Tab by mouth two (2) times a day. clarithromycin 500 mg tablet Commonly known as:  Nehemiah [place] Take 1 Tab by mouth two (2) times a day. diclofenac-miSOPROStol  mg-mcg per tablet Commonly known as:  ARTHROTEC 75 TAKE 1 TABLET BY MOUTH TWICE A DAY  
  
 estradiol 1 mg tablet Commonly known as:  ESTRACE  
TAKE 1 TABLET BY MOUTH EVERY DAY  
  
 fluticasone-salmeterol 250-50 mcg/dose diskus inhaler Commonly known as:  ADVAIR Take 1 Puff by inhalation two (2) times a day. guaiFENesin-codeine 100-10 mg/5 mL solution Commonly known as:  ROBITUSSIN AC Take 5 mL by mouth three (3) times daily as needed for Cough. Max Daily Amount: 15 mL.  
  
 guaiFENesin-dextromethorphan -30 mg per tablet Commonly known as:  Charlie & Charlie DM Take 1 Tab by mouth every twelve (12) hours as needed for Cough. hydroCHLOROthiazide 25 mg tablet Commonly known as:  HYDRODIURIL  
TAKE 1 TABLET BY MOUTH EVERY DAY FOR HIGH BLOOD PRESSURE  
  
 liothyronine 5 mcg tablet Commonly known as:  CYTOMEL  
TAKE HALF TABLET BY MOUTH TWICE A DAY AS DIRECTED * MUCINEX PO Take  by mouth. * guaiFENesin  mg ER tablet Commonly known as:  Charlie & Charlie Take 1 Tab by mouth two (2) times a day. predniSONE 20 mg tablet Commonly known as:  Derryl Situ  
 Take 1 Tab by mouth two (2) times a day. SYNTHROID 150 mcg tablet Generic drug:  levothyroxine TAKE ONE TABLET BY MOUTH ONE TIME DAILY  
  
 valsartan 160 mg tablet Commonly known as:  DIOVAN Take 0.5 Tabs by mouth daily. Decreased dose 11/2/17 VITAMIN B COMPLEX PO Take  by mouth. VITAMIN C PO Take  by mouth. VITAMIN D3 4,000 unit Cap Generic drug:  cholecalciferol (vitamin D3) Take  by mouth. ZINC PO Take  by mouth. * Notice: This list has 2 medication(s) that are the same as other medications prescribed for you. Read the directions carefully, and ask your doctor or other care provider to review them with you. Prescriptions Sent to Pharmacy Refills  
 amoxicillin-clavulanate (AUGMENTIN) 875-125 mg per tablet 0 Sig: Take 1 Tab by mouth two (2) times a day. Class: Normal  
 Pharmacy: [address] [place] [address] Ph #: [phone] Route: Oral  
 fluticasone-salmeterol (ADVAIR) 250-50 mcg/dose diskus inhaler 1 Sig: Take 1 Puff by inhalation two (2) times a day. Class: Normal  
 Pharmacy: [address] [place] [address] Ph #: [phone] Route: Inhalation Patient Instructions Sinusitis: Care Instructions Your Care Instructions Sinusitis is an infection of the lining of the sinus cavities in your head. Sinusitis often follows a cold. It causes pain and pressure in your head and face. In most cases, sinusitis gets better on its own in 1 to 2 weeks. But some mild symptoms may last for several weeks. Sometimes antibiotics are needed. Follow-up care is a key part of your treatment and safety. Be sure to make and go to all appointments, and call your doctor if you are having problems. It's also a good idea to know your test results and keep a list of the medicines you take. How can you care for yourself at home? · Take an over-the-counter pain medicine, such as acetaminophen (Tylenol), ibuprofen (Advil, Motrin), or naproxen (Aleve). Read and follow all instructions on the label. · If the doctor prescribed antibiotics, take them as directed. Do not stop taking them just because you feel better. You need to take the full course of antibiotics. · Be careful when taking over-the-counter cold or flu medicines and Tylenol at the same time. Many of these medicines have acetaminophen, which is Tylenol. Read the labels to make sure that you are not taking more than the recommended dose. Too much acetaminophen (Tylenol) can be harmful. · Breathe warm, moist air from a steamy shower, a hot bath, or a sink filled with hot water. Avoid cold, dry air. Using a humidifier in your home may help. Follow the directions for cleaning the machine. · Use saline (saltwater) nasal washes to help keep your nasal passages open and wash out mucus and bacteria. You can buy saline nose drops at a grocery store or drugstore. Or you can make your own at home by adding 1 teaspoon of salt and 1 teaspoon of baking soda to 2 cups of distilled water. If you make your own, fill a bulb syringe with the solution, insert the tip into your nostril, and squeeze gently. Dyana Luevanohs your nose. · Put a hot, wet towel or a warm gel pack on your face 3 or 4 times a day for 5 to 10 minutes each time. · Try a decongestant nasal spray like oxymetazoline (Afrin). Do not use it for more than 3 days in a row. Using it for more than 3 days can make your congestion worse. When should you call for help? Call your doctor now or seek immediate medical care if: 
? · You have new or worse swelling or redness in your face or around your eyes. ? · You have a new or higher fever. ? Watch closely for changes in your health, and be sure to contact your doctor if: 
? · You have new or worse facial pain. ? · The mucus from your nose becomes thicker (like pus) or has new blood in it. ? · You are not getting better as expected. Where can you learn more? Go to http://lonnie-parrish.info/. Enter M295 in the search box to learn more about \"Sinusitis: Care Instructions. \" Current as of: May 12, 2017 Content Version: 11.4 © 4841-7457 JumpPost. Care instructions adapted under license by [employer] (which disclaims liability or warranty for this information). If you have questions about a medical condition or this instruction, always ask your healthcare professional. [address] any warranty or liability for your use of this information. Reactive Airway Disease: Care Instructions Your Care Instructions Reactive airway disease is a breathing problem that appears as wheezing, a whistling noise in your airways. It may be caused by a viral or bacterial infection, allergies, tobacco smoke, or something else in the environment. When you are around these triggers, your body releases chemicals that make the airways get tight. Reactive airway disease is a lot like asthma. Both can cause wheezing. But asthma is ongoing, while reactive airway disease may occur only now and then. Tests can be done to tell whether you have asthma. You may take the same medicines used to treat asthma. Good home care and follow-up care with your doctor can help you recover. Follow-up care is a key part of your treatment and safety. Be sure to make and go to all appointments, and call your doctor if you are having problems. It's also a good idea to know your test results and keep a list of the medicines you take. How can you care for yourself at home? · Take your medicines exactly as prescribed. Call your doctor if you think you are having a problem with your medicine. · Do not smoke or allow others to smoke around you. If you need help quitting, talk to your doctor about stop-smoking programs and medicines. These can increase your chances of quitting for good. · If you know what caused your wheezing (such as perfume or the odor of household chemicals), try to avoid it in the future. · Wash your hands several times a day, and consider using hand gels or wipes that contain alcohol. This can prevent colds and other infections. When should you call for help? Call 911 anytime you think you may need emergency care. For example, call if: 
? · You have severe trouble breathing. ? Watch closely for changes in your health, and be sure to contact your doctor if: 
? · You cough up yellow, dark brown, or bloody mucus. ? · You have a fever. ? · Your wheezing gets worse. Where can you learn more? Go to http://lonnie-parrish.info/. Enter G756 in the search box to learn more about \"Reactive Airway Disease: Care Instructions. \" Current as of: May 12, 2017 Content Version: 11.4 © 4829-1208 HungerTime. Care instructions adapted under license by [employer] (which disclaims liability or warranty for this information). If you have questions about a medical condition or this instruction, always ask your healthcare professional. [address] any warranty or liability for your use of this information. Introducing [facility]! Dear Jesus Michele: Thank you for requesting a The Echo System account. Our records indicate that you already have an active The Echo System account. You can access your account anytime at [email or web address] [place] Did you know that you can access your hospital and ER discharge instructions at any time in The Echo System? You can also review all of your test results from your hospital stay or ER visit. Additional Information If you have questions, please visit the Frequently Asked Questions section of the The Echo System website at [email or web address] Remember, The Echo System is NOT to be used for urgent needs.  For medical emergencies, dial 911. Now available from your iPhone and Android! Please provide this summary of care documentation to your next provider. Your primary care clinician is listed as Marquis Muñiz. If you have any questions after today's visit, please call [phone].

## 2018-01-26 NOTE — PATIENT INSTRUCTIONS
Sinusitis: Care Instructions  Your Care Instructions    Sinusitis is an infection of the lining of the sinus cavities in your head. Sinusitis often follows a cold. It causes pain and pressure in your head and face. In most cases, sinusitis gets better on its own in 1 to 2 weeks. But some mild symptoms may last for several weeks. Sometimes antibiotics are needed. Follow-up care is a key part of your treatment and safety. Be sure to make and go to all appointments, and call your doctor if you are having problems. It's also a good idea to know your test results and keep a list of the medicines you take. How can you care for yourself at home? · Take an over-the-counter pain medicine, such as acetaminophen (Tylenol), ibuprofen (Advil, Motrin), or naproxen (Aleve). Read and follow all instructions on the label. · If the doctor prescribed antibiotics, take them as directed. Do not stop taking them just because you feel better. You need to take the full course of antibiotics. · Be careful when taking over-the-counter cold or flu medicines and Tylenol at the same time. Many of these medicines have acetaminophen, which is Tylenol. Read the labels to make sure that you are not taking more than the recommended dose. Too much acetaminophen (Tylenol) can be harmful. · Breathe warm, moist air from a steamy shower, a hot bath, or a sink filled with hot water. Avoid cold, dry air. Using a humidifier in your home may help. Follow the directions for cleaning the machine. · Use saline (saltwater) nasal washes to help keep your nasal passages open and wash out mucus and bacteria. You can buy saline nose drops at a grocery store or drugstore. Or you can make your own at home by adding 1 teaspoon of salt and 1 teaspoon of baking soda to 2 cups of distilled water. If you make your own, fill a bulb syringe with the solution, insert the tip into your nostril, and squeeze gently. Adriana Ruff your nose.   · Put a hot, wet towel or a warm gel pack on your face 3 or 4 times a day for 5 to 10 minutes each time. · Try a decongestant nasal spray like oxymetazoline (Afrin). Do not use it for more than 3 days in a row. Using it for more than 3 days can make your congestion worse. When should you call for help? Call your doctor now or seek immediate medical care if:  ? · You have new or worse swelling or redness in your face or around your eyes. ? · You have a new or higher fever. ? Watch closely for changes in your health, and be sure to contact your doctor if:  ? · You have new or worse facial pain. ? · The mucus from your nose becomes thicker (like pus) or has new blood in it. ? · You are not getting better as expected. Where can you learn more? Go to http://lonnie-parrish.info/. Enter H266 in the search box to learn more about \"Sinusitis: Care Instructions. \"  Current as of: May 12, 2017  Content Version: 11.4  © 5943-0474 [employer]. Care instructions adapted under license by [employer] (which disclaims liability or warranty for this information). If you have questions about a medical condition or this instruction, always ask your healthcare professional. [place] [address] any warranty or liability for your use of this information. Reactive Airway Disease: Care Instructions  Your Care Instructions    Reactive airway disease is a breathing problem that appears as wheezing, a whistling noise in your airways. It may be caused by a viral or bacterial infection, allergies, tobacco smoke, or something else in the environment. When you are around these triggers, your body releases chemicals that make the airways get tight. Reactive airway disease is a lot like asthma. Both can cause wheezing. But asthma is ongoing, while reactive airway disease may occur only now and then. Tests can be done to tell whether you have asthma. You may take the same medicines used to treat asthma.  Good home care and follow-up care with your doctor can help you recover. Follow-up care is a key part of your treatment and safety. Be sure to make and go to all appointments, and call your doctor if you are having problems. It's also a good idea to know your test results and keep a list of the medicines you take. How can you care for yourself at home? · Take your medicines exactly as prescribed. Call your doctor if you think you are having a problem with your medicine. · Do not smoke or allow others to smoke around you. If you need help quitting, talk to your doctor about stop-smoking programs and medicines. These can increase your chances of quitting for good. · If you know what caused your wheezing (such as perfume or the odor of household chemicals), try to avoid it in the future. · Wash your hands several times a day, and consider using hand gels or wipes that contain alcohol. This can prevent colds and other infections. When should you call for help? Call 911 anytime you think you may need emergency care. For example, call if:  ? · You have severe trouble breathing. ? Watch closely for changes in your health, and be sure to contact your doctor if:  ? · You cough up yellow, dark brown, or bloody mucus. ? · You have a fever. ? · Your wheezing gets worse. Where can you learn more? Go to http://lonnie-parrish.info/. Enter E993 in the search box to learn more about \"Reactive Airway Disease: Care Instructions. \"  Current as of: May 12, 2017  Content Version: 11.4  © 2894-2853 [employer]wise[employer]. Care instructions adapted under license by [employer] (which disclaims liability or warranty for this information). If you have questions about a medical condition or this instruction, always ask your healthcare professional. [address] any warranty or liability for your use of this information.

## 2018-05-29 RX ORDER — DICLOFENAC SODIUM 75 MG/1
TABLET, DELAYED RELEASE ORAL
Qty: 60 TAB | Refills: 2 | Status: SHIPPED | OUTPATIENT
Start: 2018-05-29 | End: 2018-11-01 | Stop reason: ALTCHOICE

## 2018-07-03 ENCOUNTER — OFFICE VISIT (OUTPATIENT)
Dept: INTERNAL MEDICINE CLINIC | Age: 58
End: 2018-07-03

## 2018-07-03 VITALS
HEART RATE: 96 BPM | SYSTOLIC BLOOD PRESSURE: 147 MMHG | TEMPERATURE: 100.1 F | HEIGHT: 67 IN | BODY MASS INDEX: 45.99 KG/M2 | RESPIRATION RATE: 18 BRPM | OXYGEN SATURATION: 97 % | DIASTOLIC BLOOD PRESSURE: 87 MMHG | WEIGHT: 293 LBS

## 2018-07-03 DIAGNOSIS — I10 ESSENTIAL HYPERTENSION: ICD-10-CM

## 2018-07-03 DIAGNOSIS — J01.01 ACUTE RECURRENT MAXILLARY SINUSITIS: Primary | ICD-10-CM

## 2018-07-03 DIAGNOSIS — J20.9 ACUTE BRONCHITIS, UNSPECIFIED ORGANISM: ICD-10-CM

## 2018-07-03 PROBLEM — E66.01 OBESITY, MORBID (HCC): Status: ACTIVE | Noted: 2018-07-03

## 2018-07-03 RX ORDER — AMOXICILLIN AND CLAVULANATE POTASSIUM 875; 125 MG/1; MG/1
1 TABLET, FILM COATED ORAL 2 TIMES DAILY
Qty: 20 TAB | Refills: 0 | Status: SHIPPED | OUTPATIENT
Start: 2018-07-03 | End: 2018-10-10 | Stop reason: ALTCHOICE

## 2018-07-03 RX ORDER — CODEINE PHOSPHATE AND GUAIFENESIN 10; 100 MG/5ML; MG/5ML
5 SOLUTION ORAL
Qty: 120 ML | Refills: 0 | Status: SHIPPED | OUTPATIENT
Start: 2018-07-03 | End: 2018-11-01 | Stop reason: ALTCHOICE

## 2018-07-03 RX ORDER — FLUTICASONE PROPIONATE AND SALMETEROL 250; 50 UG/1; UG/1
1 POWDER RESPIRATORY (INHALATION) 2 TIMES DAILY
Qty: 1 INHALER | Refills: 1 | Status: SHIPPED | OUTPATIENT
Start: 2018-07-03 | End: 2018-11-01 | Stop reason: ALTCHOICE

## 2018-07-03 NOTE — MR AVS SNAPSHOT
[facility] 
 
 
 [address] [place] [address] [facility] 
[phone] Patient: Tangela Alvarez MRN: [id number] :1960 Visit Information Date & Time Provider Department Dept. Phone Encounter #  
 7/3/2018  2:00 PM Tamiko Steele MD [facility] [id number] Your Appointments 2018 10:00 AM  
Complete Physical with Tamiko Steele MD  
[facility] [place] [address]) Appt Note: my chart physical with fasting labs Sc 18 [address] [place] [address]  
  
   
 Agust[address]karina King [phone]  
  
    
 2018  8:20 AM  
MAMMOGRAPHY with MAMMOGRAMKOFI [employer] ([address] [place] [address]) Appt Note: AE/? 3D Mammo/MH pt  
 [address] [place] [address]  
[phone]  
  
   
 [address] [place] [address]  
  
    
 2018  8:40 AM  
ESTABLISHED PATIENT with Micha Gibson MD  
[employer] ([address] [place] [address]) Appt Note: AE/? 3D Mammo/MH pt  
 [address] [address]  
[address] Upcoming Health Maintenance Date Due Pneumococcal 19-64 Medium Risk (1 of 1 - PPSV23) 10/13/1981 PAP AKA CERVICAL CYTOLOGY 2018 Influenza Age 5 to Adult 2018 BREAST CANCER SCRN MAMMOGRAM 9/15/2019 DTaP/Tdap/Td series (2 - Td) 2021 COLONOSCOPY 2025 Allergies as of 7/3/2018  Review Complete On: 7/3/2018 By: Tamiko Steele MD  
  
 Severity Noted Reaction Type Reactions Effexor [Venlafaxine]  2009    Other (comments) Htn,nose bleeding,increase in anxiety Gluten  2016    Other (comments) Irritable bowel symptoms, bloating Levaquin [Levofloxacin]  2010   Side Effect Myalgia Per pt muscle weakness Lisinopril  08/31/2012    Diarrhea Metformin  08/12/2014    Diarrhea Other Medication  10/07/2010   Side Effect Swelling Family of grains Current Immunizations  Reviewed on 11/2/2017 Name Date Influenza Vaccine 8/1/2017, 8/24/2016 Influenza Vaccine Whole 9/21/2010 TD Vaccine 3/5/2006 TDAP Vaccine 8/31/2011 Not reviewed this visit You Were Diagnosed With   
  
 Codes Comments Acute recurrent maxillary sinusitis    -  Primary ICD-10-CM: J01.01 
ICD-9-CM: 461.0 Acute bronchitis, unspecified organism     ICD-10-CM: J20.9 ICD-9-CM: 466.0 Essential hypertension     ICD-10-CM: I10 
ICD-9-CM: 401.9 Vitals BP Pulse Temp Resp Height(growth percentile) Weight(growth percentile) 147/87 (BP 1 Location: Left arm, BP Patient Position: Sitting) 96 100.1 °F (37.8 °C) (Oral) 18 5' 7\" (1.702 m) 314 lb (142.4 kg) LMP SpO2 BMI OB Status Smoking Status 08/20/2000 97% 49.18 kg/m2 Hysterectomy Never Smoker BMI and BSA Data Body Mass Index Body Surface Area  
 49.18 kg/m 2 2.59 m 2 Preferred Pharmacy Pharmacy Name Phone [facility] [place] [facility] [phone] Your Updated Medication List  
  
   
This list is accurate as of 7/3/18  2:57 PM.  Always use your most recent med list.  
  
  
  
  
 albuterol 90 mcg/actuation inhaler Commonly known as:  PROVENTIL HFA, VENTOLIN HFA, PROAIR HFA Take 2 Puffs by inhalation every four (4) hours as needed for Wheezing. amoxicillin-clavulanate 875-125 mg per tablet Commonly known as:  AUGMENTIN Take 1 Tab by mouth two (2) times a day. diclofenac EC 75 mg EC tablet Commonly known as:  VOLTAREN  
TAKE ONE TABLET BY MOUTH TWICE DAILY  
  
 estradiol 1 mg tablet Commonly known as:  ESTRACE  
TAKE 1 TABLET BY MOUTH EVERY DAY  
  
 fluticasone-salmeterol 250-50 mcg/dose diskus inhaler Commonly known as:  ADVAIR  
 Take 1 Puff by inhalation two (2) times a day. guaiFENesin  mg ER tablet Commonly known as:  Charlie & Charlie Take 1 Tab by mouth two (2) times a day. guaiFENesin-codeine 100-10 mg/5 mL solution Commonly known as:  ROBITUSSIN AC Take 5 mL by mouth three (3) times daily as needed for Cough. Max Daily Amount: 15 mL.  
  
 hydroCHLOROthiazide 25 mg tablet Commonly known as:  HYDRODIURIL Take 1 Tab by mouth daily. liothyronine 5 mcg tablet Commonly known as:  CYTOMEL  
TAKE HALF TABLET BY MOUTH TWICE A DAY AS DIRECTED 2.5 once daily SYNTHROID 150 mcg tablet Generic drug:  levothyroxine TAKE ONE TABLET BY MOUTH ONE TIME DAILY  
  
 valsartan 160 mg tablet Commonly known as:  DIOVAN Take 0.5 Tabs by mouth daily. Decreased dose 11/2/17 VITAMIN B COMPLEX PO Take 1 Tab by mouth daily. VITAMIN C PO Take 1 Tab by mouth daily. VITAMIN D3 4,000 unit Cap Generic drug:  cholecalciferol (vitamin D3) Take 1 Tab by mouth daily. ZINC PO Take 1 Tab by mouth daily. Prescriptions Printed Refills  
 guaiFENesin-codeine (ROBITUSSIN AC) 100-10 mg/5 mL solution 0 Sig: Take 5 mL by mouth three (3) times daily as needed for Cough. Max Daily Amount: 15 mL. Class: Print Route: Oral  
  
Prescriptions Sent to Pharmacy Refills  
 amoxicillin-clavulanate (AUGMENTIN) 875-125 mg per tablet 0 Sig: Take 1 Tab by mouth two (2) times a day. Class: Normal  
 Pharmacy: [address] [place] [address] Ph #: [phone] Route: Oral  
 fluticasone-salmeterol (ADVAIR) 250-50 mcg/dose diskus inhaler 1 Sig: Take 1 Puff by inhalation two (2) times a day. Class: Normal  
 Pharmacy: [address] [place] [address] Ph #: [phone] Route: Inhalation Introducing [facility] & HEALTH SERVICES! Dear Honey Samayoa: Thank you for requesting a TrueAccordt account.   Our records indicate that you already have an active OpenSynergy account. You can access your account anytime at [email or web address] Did you know that you can access your hospital and ER discharge instructions at any time in OpenSynergy? You can also review all of your test results from your hospital stay or ER visit. Additional Information If you have questions, please visit the Frequently Asked Questions section of the OpenSynergy website at [email or web address] Remember, OpenSynergy is NOT to be used for urgent needs. For medical emergencies, dial 911. Now available from your iPhone and Android! Please provide this summary of care documentation to your next provider. Your primary care clinician is listed as Shakira Alvarenga. If you have any questions after today's visit, please call [phone].

## 2018-07-06 NOTE — PROGRESS NOTES
Subjective:   Presents with nasal blockage, post nasal drip and bilateral sinus pain for 5 days. Denies shortness of breath, chest pain, abdominal pain, nausea, vomiting,  fever. Symptoms are moderate. Recent treatment for similar symptoms? None   Has tried over-the-counter remedies  with partial relief of symptoms. Contacts with similar infections: no. Asthma?:  no.  reports that she has never smoked. She has never used smokeless tobacco.    Review of Systems  Pertinent items are noted in HPI. Objective:   Blood pressure 147/87, pulse 96, temperature 100.1 °F (37.8 °C), temperature source Oral, resp. rate 18, height 5' 7\" (1.702 m), weight 314 lb (142.4 kg), last menstrual period 08/20/2000, SpO2 97 %. General:  alert, cooperative, no distress   Eyes: conjunctivae/corneas clear. Ears: normal TM's and external ear canals AU   Sinuses: paranasal sinuses with mild tenderness, Patient has indurated and edematous nasal tubinates   Mouth:  normal findings: palate normal, tongue midline and normal and oropharynx pink & moist with erythema, but no exudates, ulcers or evidence of thrush   Neck: supple, symmetrical, trachea midline and no adenopathy. Heart: S1 and S2 normal, no murmurs noted. Lungs: clear to auscultation bilaterally   Abdomen: soft, non-tender. Bowel sounds normal. No masses,  no organomegaly        Assessment/Plan:   Acute sinusitis   Patient has failed conservative therapy  Orders Placed This Encounter    amoxicillin-clavulanate (AUGMENTIN) 875-125 mg per tablet    fluticasone-salmeterol (ADVAIR) 250-50 mcg/dose diskus inhaler    guaiFENesin-codeine (ROBITUSSIN AC) 100-10 mg/5 mL solution       - Seek medical care if symptoms become more severe or if you develop chest pain, shortness of breath, confusion. Contact us if your symptoms fail to improve after 7-10 days Rest as much as possible and stay home from work/school at least 24 hours   after last fever.    Wash hand frequently and cough/sneeze into your sleeve to help prevent infection of others. Drink plenty of fluids  - Ibuprofen (Advil, Motrin) 400-800mg every 6 hours or Aleve 220 mg 1-2 pills every 8 hours for fever, headache, pain  - Tylenol extra strength 500 mg every 6 hours for pain, headache, fever  - Nasal saline rinses 2-3 times daily for nasal congestion  - Benadryl (diphenhydramine) 50 mg at night for nasal congestion/allergies  - Pseudophedrine 12-hour tablets twice daily for nasal and inner ear congestion.    - Afrin (oxymetazoline) nasal spray 2 sprays in each nostril twice daily for severe      congestion. Do not use this medication for more than 3 days as it may cause         \"rebound congestion\".

## 2018-07-09 ENCOUNTER — TELEPHONE (OUTPATIENT)
Dept: INTERNAL MEDICINE CLINIC | Age: 58
End: 2018-07-09

## 2018-07-09 RX ORDER — PREDNISONE 20 MG/1
TABLET ORAL
Qty: 18 TAB | Refills: 0 | Status: SHIPPED | OUTPATIENT
Start: 2018-07-09 | End: 2019-02-04 | Stop reason: SDUPTHER

## 2018-07-09 NOTE — TELEPHONE ENCOUNTER
Patient called wanting to know if  [facility] would be able to call her in a script for Prednisone. Patient states that she was seen last week for Acute sinusitis, and has had no relief. Patient would like a call back at her work number. Thank you.

## 2018-09-14 RX ORDER — ESTRADIOL 1 MG/1
TABLET ORAL
Qty: 30 TAB | Refills: 0 | Status: SHIPPED | OUTPATIENT
Start: 2018-09-14 | End: 2018-10-18 | Stop reason: SDUPTHER

## 2018-10-10 ENCOUNTER — OFFICE VISIT (OUTPATIENT)
Dept: OBGYN CLINIC | Age: 58
End: 2018-10-10

## 2018-10-10 VITALS
HEIGHT: 67 IN | HEART RATE: 90 BPM | BODY MASS INDEX: 45.99 KG/M2 | SYSTOLIC BLOOD PRESSURE: 128 MMHG | DIASTOLIC BLOOD PRESSURE: 93 MMHG | WEIGHT: 293 LBS | OXYGEN SATURATION: 98 % | RESPIRATION RATE: 18 BRPM

## 2018-10-10 DIAGNOSIS — Z01.419 WELL WOMAN EXAM WITH ROUTINE GYNECOLOGICAL EXAM: ICD-10-CM

## 2018-10-10 DIAGNOSIS — Z11.51 SCREENING FOR HUMAN PAPILLOMAVIRUS (HPV): Primary | ICD-10-CM

## 2018-10-10 NOTE — PATIENT INSTRUCTIONS

## 2018-10-10 NOTE — PROGRESS NOTES
Annual exam ages 40-58    Louis Austin is a No obstetric history on file. ,  56 y.o. female [facility] Patient's last menstrual period was 08/20/2000. Stephanie Orellana She presents for her annual checkup. She is having some pain behind her left nipple for the past few months. With regard to the Gardasil vaccine, she is older than the age for which it is FDA approved. Menstrual status:    Contraception  Sexual history:    She  reports that she does not currently engage in sexual activity. Medical conditions:    Since her last annual GYN exam about three or more years ago, she has not the following changes in her health history: none. Pap and Mammogram History:    Her most recent Pap smear was normal, obtained 3 year(s) ago. The patient had her mammogram today in our office. Breast Cancer History/Substance Abuse: negative    Osteoporosis History:    Family history does not include a first or second degree relative with osteopenia or osteoporosis. Past Medical History:   Diagnosis Date    AR (allergic rhinitis)     Dr. Lemus Co dyskinesia     s/p azeb    Depression     Endometriosis     LASHANDA 10/2008 Dr. Kamran Dawkins Environmental allergies 10/2010    oats, barley, cotton seed. Dr. Aileen David    Gluten intolerance     HTN (hypertension)     Hyperlipidemia LDL goal < 130     Hypothyroidism     Dr. Barry Aranda Impaired fasting glucose     105 7/2008    Menopausal disorder     MR (mitral regurgitation)     mild    Sebaceous cyst of breast     Dr. Lori Jerome Skin abnormalities 8/2010    pre-cancerous lesions of face, Dr. Micha Grant D deficiency     VSD (ventricular septal defect)     self-closed age 11, TTE normal 3/09     Past Surgical History:   Procedure Laterality Date    COLONOSCOPY  9/2009    Dr. Dede Madden  12/30/2010    left, had lipoma removed.   Dr. Bobby Turner    HX COLONOSCOPY  07/29/2015    normal.  Dr. Maryjane Milian HX CYST REMOVAL  06/2017    Dr. Vasquez Bailey HX ENDOSCOPY  07/29/2015    gastritis    HX HEART CATHETERIZATION      age 5, age 11 due to VSD    HX LAP CHOLECYSTECTOMY  8/01/2014    Dr. Ramsey Boyle [place] and adhesions    HX LASHANDA AND BSO  10/2008    HX TONSILLECTOMY         Current Outpatient Prescriptions   Medication Sig Dispense Refill    estradiol (ESTRACE) 1 mg tablet TAKE 1 TABLET BY MOUTH EVERY DAY 30 Tab 0    hydroCHLOROthiazide (HYDRODIURIL) 25 mg tablet Take 1 Tab by mouth daily. 90 Tab 1    SYNTHROID 150 mcg tablet TAKE ONE TABLET BY MOUTH ONE TIME DAILY  6    ZINC PO Take 1 Tab by mouth daily.  valsartan (DIOVAN) 160 mg tablet Take 0.5 Tabs by mouth daily. Decreased dose 11/2/17 30 Tab 3    ASCORBATE CALCIUM (VITAMIN C PO) Take 1 Tab by mouth daily.  cholecalciferol, vitamin D3, (VITAMIN D3) 4,000 unit cap Take 1 Tab by mouth daily.  VITAMIN B COMPLEX PO Take 1 Tab by mouth daily.  fluticasone-salmeterol (ADVAIR) 250-50 mcg/dose diskus inhaler Take 1 Puff by inhalation two (2) times a day. 1 Inhaler 1    guaiFENesin-codeine (ROBITUSSIN AC) 100-10 mg/5 mL solution Take 5 mL by mouth three (3) times daily as needed for Cough. Max Daily Amount: 15 mL. 120 mL 0    diclofenac EC (VOLTAREN) 75 mg EC tablet TAKE ONE TABLET BY MOUTH TWICE DAILY 60 Tab 2    guaiFENesin ER (MUCINEX) 600 mg ER tablet Take 1 Tab by mouth two (2) times a day. (Patient taking differently: Take 600 mg by mouth as needed.) 20 Tab 0    liothyronine (CYTOMEL) 5 mcg tablet TAKE HALF TABLET BY MOUTH TWICE A DAY AS DIRECTED 2.5 once daily  6    albuterol (PROVENTIL HFA, VENTOLIN HFA, PROAIR HFA) 90 mcg/actuation inhaler Take 2 Puffs by inhalation every four (4) hours as needed for Wheezing. 1 Inhaler 2     Allergies: Effexor [venlafaxine]; Gluten; Levaquin [levofloxacin]; Lisinopril; Metformin; and Other medication     Tobacco History:  reports that she has never smoked.  She has never used smokeless tobacco.  Alcohol Abuse:  reports that she does not drink alcohol. Drug Abuse:  reports that she does not use illicit drugs.     Family Medical/Cancer History:   Family History   Problem Relation Age of Onset   Vish Cancer Mother      breast age 48    Stroke Mother     Heart Disease Father      CABGx4    High Cholesterol Father     Deep Vein Thrombosis Father      left arm    Diabetes Maternal Grandmother     Heart Disease Paternal Grandfather     High Cholesterol Paternal Grandfather     Heart Attack Paternal Grandfather     Arthritis-rheumatoid Paternal Grandmother     Thyroid Disease Paternal Grandmother     Osteoporosis Maternal Aunt         Review of Systems - History obtained from the patient  Constitutional: negative for weight loss, fever, night sweats  HEENT: negative for hearing loss, earache, congestion, snoring, sorethroat  CV: negative for chest pain, palpitations, edema  Resp: negative for cough, shortness of breath, wheezing  GI: negative for change in bowel habits, abdominal pain, black or bloody stools  : negative for frequency, dysuria, hematuria, vaginal discharge  MSK: negative for back pain, joint pain, muscle pain  Breast: negative for breast lumps, nipple discharge, galactorrhea  Skin :negative for itching, rash, hives  Neuro: negative for dizziness, headache, confusion, weakness  Psych: negative for anxiety, depression, change in mood  Heme/lymph: negative for bleeding, bruising, pallor    Physical Exam    Visit Vitals    BP (!) 128/93    Pulse 90    Resp 18    Ht 5' 7\" (1.702 m)    Wt 318 lb (144.2 kg)    LMP 08/20/2000    SpO2 98%    BMI 49.81 kg/m2       Constitutional  · Appearance: well-nourished, well developed, alert, in no acute distress    HENT  · Head and Face: appears normal    Neck  · Inspection/Palpation: normal appearance, no masses or tenderness  · Lymph Nodes: no lymphadenopathy present  · Thyroid: gland size normal, nontender, no nodules or masses present on palpation    Chest  · Respiratory Effort: breathing unlabored  · Auscultation:     Cardiovascular  · Heart:  · Auscultation:     Breasts  · Inspection of Breasts: breasts symmetrical, no skin changes, no discharge present, nipple appearance normal, no skin retraction present  · Palpation of Breasts and Axillae: no masses present on palpation, no breast tenderness  · Axillary Lymph Nodes: no lymphadenopathy present    Gastrointestinal  · Abdominal Examination: abdomen non-tender to palpation, normal bowel sounds, no masses present  · Liver and spleen: no hepatomegaly present, spleen not palpable  · Hernias: no hernias identified    Genitourinary  · External Genitalia: normal appearance for age, no discharge present, no tenderness present, no inflammatory lesions present, no masses present, no atrophy present  · Vagina: normal vaginal vault without central or paravaginal defects, no discharge present, no inflammatory lesions present, no masses present  · Bladder: non-tender to palpation  · Urethra: appears normal  · Cervix: normal   · Uterus: absent  · Adnexa: no adnexal tenderness present, no adnexal masses present  · Perineum: perineum within normal limits, no evidence of trauma, no rashes or skin lesions present  · Anus: anus within normal limits, no hemorrhoids present  · Inguinal Lymph Nodes: no lymphadenopathy present    Skin  · General Inspection: no rash, no lesions identified    Neurologic/Psychiatric  · Mental Status:  · Orientation: grossly oriented to person, place and time  · Mood and Affect: mood normal, affect appropriate    Assessment:  Routine gynecologic examination  Her current medical status is satisfactory with no evidence of significant gynecologic issues. Plan:  Counseled re: diet, exercise, healthy lifestyle  Return for yearly wellness visits  Rec annual mammogram  She will see Dr Nitin Sher whom she has seen in the past, for her breast issue.   She has been on estrace PO but will stop it this year and see how she feels

## 2018-10-12 LAB
CYTOLOGIST CVX/VAG CYTO: NORMAL
CYTOLOGY CVX/VAG DOC THIN PREP: NORMAL
CYTOLOGY HISTORY:: NORMAL
DX ICD CODE: NORMAL
HPV I/H RISK 1 DNA CVX QL PROBE+SIG AMP: NEGATIVE
Lab: NORMAL
OTHER STN SPEC: NORMAL
PATH REPORT.FINAL DX SPEC: NORMAL
STAT OF ADQ CVX/VAG CYTO-IMP: NORMAL

## 2018-10-18 RX ORDER — HYDROCHLOROTHIAZIDE 25 MG/1
TABLET ORAL
Qty: 90 TAB | Refills: 1 | Status: SHIPPED | OUTPATIENT
Start: 2018-10-18 | End: 2019-04-09 | Stop reason: SDUPTHER

## 2018-11-01 ENCOUNTER — OFFICE VISIT (OUTPATIENT)
Dept: INTERNAL MEDICINE CLINIC | Age: 58
End: 2018-11-01

## 2018-11-01 VITALS
DIASTOLIC BLOOD PRESSURE: 73 MMHG | SYSTOLIC BLOOD PRESSURE: 152 MMHG | WEIGHT: 293 LBS | TEMPERATURE: 98.2 F | BODY MASS INDEX: 45.99 KG/M2 | OXYGEN SATURATION: 98 % | HEART RATE: 89 BPM | RESPIRATION RATE: 18 BRPM | HEIGHT: 67 IN

## 2018-11-01 DIAGNOSIS — Z23 ENCOUNTER FOR IMMUNIZATION: ICD-10-CM

## 2018-11-01 DIAGNOSIS — R53.82 CHRONIC FATIGUE: ICD-10-CM

## 2018-11-01 DIAGNOSIS — E66.01 OBESITY, MORBID, BMI 40.0-49.9 (HCC): ICD-10-CM

## 2018-11-01 DIAGNOSIS — I10 ESSENTIAL HYPERTENSION: ICD-10-CM

## 2018-11-01 DIAGNOSIS — Z00.00 WELL WOMAN EXAM (NO GYNECOLOGICAL EXAM): Primary | ICD-10-CM

## 2018-11-01 DIAGNOSIS — M62.08 RECTUS DIASTASIS: ICD-10-CM

## 2018-11-01 DIAGNOSIS — E78.5 HYPERLIPIDEMIA WITH TARGET LDL LESS THAN 130: ICD-10-CM

## 2018-11-01 DIAGNOSIS — G89.4 CHRONIC PAIN SYNDROME: ICD-10-CM

## 2018-11-01 DIAGNOSIS — N39.3 STRESS INCONTINENCE OF URINE: ICD-10-CM

## 2018-11-01 DIAGNOSIS — E55.9 VITAMIN D DEFICIENCY: ICD-10-CM

## 2018-11-01 RX ORDER — TRAMADOL HYDROCHLORIDE 50 MG/1
50 TABLET ORAL
Qty: 60 TAB | Refills: 0 | Status: SHIPPED | OUTPATIENT
Start: 2018-11-01 | End: 2019-08-05 | Stop reason: SDUPTHER

## 2018-11-01 RX ORDER — LANOLIN ALCOHOL/MO/W.PET/CERES
CREAM (GRAM) TOPICAL
COMMUNITY
End: 2021-08-12 | Stop reason: ALTCHOICE

## 2018-11-01 RX ORDER — LANOLIN ALCOHOL/MO/W.PET/CERES
400 CREAM (GRAM) TOPICAL DAILY
COMMUNITY

## 2018-11-01 RX ORDER — VALSARTAN 160 MG/1
160 TABLET ORAL DAILY
Qty: 30 TAB | Refills: 5
Start: 2018-11-01 | End: 2018-12-17 | Stop reason: CLARIF

## 2018-11-01 NOTE — PROGRESS NOTES
Latha Albert is a 66 y.o. female  Presenting for her annual checkup and follow-up  Chief Complaint   Patient presents with    Complete Physical    Swelling     Reports that she has stopped taking diclofenac d/t fluid retention    Pain (Chronic)     Pain management    Fatigue     c/o increased fatigue     Working for [employer]. She saw Dr. Aric Penn for her Pap smear 10/10/18. Reports worsening urinary incontinence. Occurs with movement, coughing, sneezing. Onset of symptoms ago. Symptoms are currently mild to moderate. She forgot to mention this to her GYN. Denies any having to rush to get to the bathroom or incontinence at night. Reports worsening muscle and joint pains. Stopped diclofenac 10/6/18 due to some fluid retention and which is improved. Taking mag and iron    Left mid abdominal pains and it pushes out some at times. .  Hurts some to touch at times. Hx 5 abd surgeries. Increased fatigue. She thinks she snores, but no witnessed sleep apnea. Lives alone. Her sleep is poor and she wakes up several times per night with unclear etiology. Mild morning headaches at times. Seeing endo for thyroid, last checked 6/2018    Had prolonged bronchitits    Hypertension  Hypertension ROS: taking medications as instructed, no medication side effects noted, no TIA's, no chest pain on exertion, no dyspnea on exertion, no swelling of ankles     reports that  has never smoked. she has never used smokeless tobacco.    reports that she does not drink alcohol. BP Readings from Last 2 Encounters:   11/01/18 152/73   10/10/18 (!) 128/93     Hyperlipidemia  ROS: taking medications as instructed, no medication side effects noted  No new myalgias, no joint pains, no weakness  No TIA's, no chest pain on exertion, no dyspnea on exertion, no swelling of ankles.    Lab Results   Component Value Date/Time    Cholesterol, total 187 11/01/2018 04:47 PM    HDL Cholesterol 50 11/01/2018 04:47 PM    LDL, calculated 119 (H) 11/01/2018 04:47 PM    VLDL, calculated 18 11/01/2018 04:47 PM    Triglyceride 90 11/01/2018 04:47 PM    CHOL/HDL Ratio 3.0 02/22/2010 09:01 AM       Last DEXA:   Health Maintenance   Topic Date Due    Pneumococcal 19-64 Medium Risk (1 of 1 - PPSV23) 10/13/1981    Shingrix Vaccine Age 50> (1 of 2) 10/13/2012    BREAST CANCER SCRN MAMMOGRAM  10/10/2020    DTaP/Tdap/Td series (2 - Td) 08/31/2021    PAP AKA CERVICAL CYTOLOGY  10/10/2021    COLONOSCOPY  07/29/2025    Hepatitis C Screening  Completed    Influenza Age 5 to Adult  Completed       Exercise: not active  Diet: generally follows a low fat low cholesterol diet    Vaccinations reviewed  Immunization History   Administered Date(s) Administered    Influenza Vaccine 08/24/2016, 08/01/2017, 10/18/2018    Influenza Vaccine Whole 09/21/2010    TD Vaccine 03/05/2006    TDAP Vaccine 08/31/2011       Allergies: Effexor [venlafaxine]; Gluten; Levaquin [levofloxacin]; Lisinopril; Metformin; and Other medication  Current Outpatient Medications   Medication Sig    magnesium oxide (MAG-OX) 400 mg tablet Take 400 mg by mouth daily.  ferrous sulfate (IRON) 325 mg (65 mg iron) tablet Take  by mouth Daily (before breakfast).  hydroCHLOROthiazide (HYDRODIURIL) 25 mg tablet TAKE 1 TABLET BY MOUTH EVERY DAY    SYNTHROID 150 mcg tablet TAKE ONE TABLET BY MOUTH ONE TIME DAILY    ZINC PO Take 1 Tab by mouth daily.  valsartan (DIOVAN) 160 mg tablet Take 0.5 Tabs by mouth daily. Decreased dose 11/2/17    ASCORBATE CALCIUM (VITAMIN C PO) Take 1 Tab by mouth daily.  cholecalciferol, vitamin D3, (VITAMIN D3) 4,000 unit cap Take 1 Tab by mouth daily.  VITAMIN B COMPLEX PO Take 1 Tab by mouth daily. No current facility-administered medications for this visit.        has a past medical history of AR (allergic rhinitis), Biliary dyskinesia, Depression, Endometriosis, Environmental allergies, Gluten intolerance, HTN (hypertension), Hyperlipidemia LDL goal < 130, Hypothyroidism, Impaired fasting glucose, Menopausal disorder, MR (mitral regurgitation), Sebaceous cyst of breast, Skin abnormalities, Vitamin D deficiency, and VSD (ventricular septal defect). Past Surgical History:   Procedure Laterality Date    COLONOSCOPY  9/2009    Dr. Cyndi Jolley  12/30/2010    left, had lipoma removed.   Dr. Prince Galvez    HX COLONOSCOPY  07/29/2015    normal.  Dr. Meghan Sampson    [address] HX CYST REMOVAL  06/2017    Dr. Chano Sandoval HX ENDOSCOPY  07/29/2015    gastritis    HX HEART CATHETERIZATION      age 5, age 11 due to VSD    HX LAP CHOLECYSTECTOMY  8/01/2014    Dr. Portia Mehta [facility] and adhesions    HX LASHANDA AND BSO  10/2008    HX TONSILLECTOMY        Social History     Socioeconomic History    Marital status: SINGLE     Spouse name: Not on file    Number of children: 0    Years of education: Not on file    Highest education level: Not on file   Social Needs    Financial resource strain: Not on file    Food insecurity - worry: Not on file    Food insecurity - inability: Not on file   [employer] needs - medical: Not on file   [employer] needs - non-medical: Not on file   Occupational History    Not on file   Tobacco Use    Smoking status: Never Smoker    Smokeless tobacco: Never Used   Substance and Sexual Activity    Alcohol use: No    Drug use: No    Sexual activity: Not Currently   Other Topics Concern    Not on file   Social History Narrative    Not on file     Family History   Problem Relation Age of Onset    Cancer Mother         breast age 48    Stroke Mother     Heart Disease Father         CABGx4    High Cholesterol Father     Deep Vein Thrombosis Father         left arm    Diabetes Maternal Grandmother     Heart Disease Paternal Grandfather     High Cholesterol Paternal Grandfather     Heart Attack Paternal Grandfather     Arthritis-rheumatoid Paternal Grandmother     Thyroid Disease Paternal Grandmother     Osteoporosis Maternal Aunt        Review of Systems - History obtained from the patient  General ROS: negative for - night sweats, weight gain or weight loss  Cardiovascular ROS: no chest pain, dyspnea on exertion, edema  GYN ROS: no breast pain or new or enlarging lumps on self exam.    Physical exam  Blood pressure 152/73, pulse 89, temperature 98.2 °F (36.8 °C), temperature source Oral, resp. rate 18, height 5' 7\" (1.702 m), weight 318 lb 9.6 oz (144.5 kg), last menstrual period 08/20/2000, SpO2 98 %. Wt Readings from Last 3 Encounters:   11/01/18 318 lb 9.6 oz (144.5 kg)   10/10/18 318 lb (144.2 kg)   07/03/18 314 lb (142.4 kg)     she appears well, alert and oriented x 3, pleasant and cooperative. Vitals as noted. No rashes or significant lesions. Neck supple and free of adenopathy, or masses. No thyromegaly or carotid bruits. Cranial nerves normal. Lungs are clear to auscultation. Heart sounds are normal with no murmurs, clicks, gallops or rubs. Abdomen is soft, non- tender, with no masses or organomegaly. Extremities, peripheral pulses and reflexes are normal.  . BREAST EXAM: not examined  PELVIC EXAM: examination not indicated      Diagnoses and all orders for this visit:    1. Well woman exam (no gynecological exam)  Obesity is her primary health risk. Strongly recommend increasing exercise and improving diet. Treat sleep apnea as below. -     CBC WITH AUTOMATED DIFF  -     METABOLIC PANEL, COMPREHENSIVE  -     LIPID PANEL  -     IRON PROFILE    2. Encounter for immunization  -     PNEUMOCOCCAL POLYSACCHARIDE VACCINE, 23-VALENT, ADULT OR IMMUNOSUPPRESSED PT DOSE,  -     PA IMMUNIZ ADMIN,1 SINGLE/COMB VAC/TOXOID    3. Essential hypertension  Uncontrolled. Increase valsartan.  -     valsartan (DIOVAN) 160 mg tablet; Take 1 Tab by mouth daily. Increased 11/1/18  -     HEMOGLOBIN A1C WITH EAG    4.  Hyperlipidemia with target LDL less than 130   The patient is asked to make an attempt to improve diet and exercise patterns to aid in medical management of this problem. -     LIPID PANEL    5. Vitamin D deficiency continue supplement. -     VITAMIN D, 25 HYDROXY    6. Chronic fatigue  7. Obesity, morbid, BMI 40.0-49.9 (HCC)  Morning headaches  Snoring  Uncontrolled hypertension  Significant risk of obstructive sleep apnea. Symptomatic. Recommend home sleep study and CPAP trial if applicable. -     REFERRAL TO SLEEP STUDIES    8. Rectus diastasis  No hernia. Several surgeries. Reassured. Monitor for now. 9. Stress incontinence of urine  Refer to physical therapy at [facility] per request.  I consulted GYN about this. 10. Chronic pain syndrome avoid NSAIDs because of fluid retention risk. Tylenol does not help. Use tramadol sparingly.  profile was accessed online for Inocencia Meza and reviewed by me during this encounter. I did not see evidence of inappropriate or suspicious controlled substance prescription activity. -     traMADol (ULTRAM) 50 mg tablet; Take 1 Tab by mouth every six (6) hours as needed for Pain. Max Daily Amount: 200 mg.     The patient is asked to make an attempt to improve diet and exercise patterns    Return for yearly wellness visits

## 2018-11-02 DIAGNOSIS — N39.3 STRESS INCONTINENCE OF URINE: Primary | ICD-10-CM

## 2018-11-02 LAB
25(OH)D3+25(OH)D2 SERPL-MCNC: 51.1 NG/ML (ref 30–100)
ALBUMIN SERPL-MCNC: 4.6 G/DL (ref 3.5–5.5)
ALBUMIN/GLOB SERPL: 2 {RATIO} (ref 1.2–2.2)
ALP SERPL-CCNC: 96 IU/L (ref 39–117)
ALT SERPL-CCNC: 20 IU/L (ref 0–32)
AST SERPL-CCNC: 18 IU/L (ref 0–40)
BASOPHILS # BLD AUTO: 0 X10E3/UL (ref 0–0.2)
BASOPHILS NFR BLD AUTO: 0 %
BILIRUB SERPL-MCNC: 0.3 MG/DL (ref 0–1.2)
BUN SERPL-MCNC: 15 MG/DL (ref 6–24)
BUN/CREAT SERPL: 22 (ref 9–23)
CALCIUM SERPL-MCNC: 9.7 MG/DL (ref 8.7–10.2)
CHLORIDE SERPL-SCNC: 97 MMOL/L (ref 96–106)
CHOLEST SERPL-MCNC: 187 MG/DL (ref 100–199)
CO2 SERPL-SCNC: 26 MMOL/L (ref 20–29)
CREAT SERPL-MCNC: 0.69 MG/DL (ref 0.57–1)
EOSINOPHIL # BLD AUTO: 0 X10E3/UL (ref 0–0.4)
EOSINOPHIL NFR BLD AUTO: 0 %
ERYTHROCYTE [DISTWIDTH] IN BLOOD BY AUTOMATED COUNT: 14.9 % (ref 12.3–15.4)
EST. AVERAGE GLUCOSE BLD GHB EST-MCNC: 123 MG/DL
GLOBULIN SER CALC-MCNC: 2.3 G/DL (ref 1.5–4.5)
GLUCOSE SERPL-MCNC: 77 MG/DL (ref 65–99)
HBA1C MFR BLD: 5.9 % (ref 4.8–5.6)
HCT VFR BLD AUTO: 40 % (ref 34–46.6)
HDLC SERPL-MCNC: 50 MG/DL
HGB BLD-MCNC: 13.1 G/DL (ref 11.1–15.9)
IMM GRANULOCYTES # BLD: 0 X10E3/UL (ref 0–0.1)
IMM GRANULOCYTES NFR BLD: 0 %
INTERPRETATION, 910389: NORMAL
IRON SATN MFR SERPL: 16 % (ref 15–55)
IRON SERPL-MCNC: 65 UG/DL (ref 27–159)
LDLC SERPL CALC-MCNC: 119 MG/DL (ref 0–99)
LYMPHOCYTES # BLD AUTO: 3.1 X10E3/UL (ref 0.7–3.1)
LYMPHOCYTES NFR BLD AUTO: 40 %
MCH RBC QN AUTO: 28.1 PG (ref 26.6–33)
MCHC RBC AUTO-ENTMCNC: 32.8 G/DL (ref 31.5–35.7)
MCV RBC AUTO: 86 FL (ref 79–97)
MONOCYTES # BLD AUTO: 0.4 X10E3/UL (ref 0.1–0.9)
MONOCYTES NFR BLD AUTO: 6 %
NEUTROPHILS # BLD AUTO: 4.1 X10E3/UL (ref 1.4–7)
NEUTROPHILS NFR BLD AUTO: 54 %
PLATELET # BLD AUTO: 266 X10E3/UL (ref 150–379)
POTASSIUM SERPL-SCNC: 4 MMOL/L (ref 3.5–5.2)
PROT SERPL-MCNC: 6.9 G/DL (ref 6–8.5)
RBC # BLD AUTO: 4.67 X10E6/UL (ref 3.77–5.28)
SODIUM SERPL-SCNC: 142 MMOL/L (ref 134–144)
TIBC SERPL-MCNC: 394 UG/DL (ref 250–450)
TRIGL SERPL-MCNC: 90 MG/DL (ref 0–149)
UIBC SERPL-MCNC: 329 UG/DL (ref 131–425)
VLDLC SERPL CALC-MCNC: 18 MG/DL (ref 5–40)
WBC # BLD AUTO: 7.7 X10E3/UL (ref 3.4–10.8)

## 2018-11-12 PROBLEM — G47.33 OSA (OBSTRUCTIVE SLEEP APNEA): Status: ACTIVE | Noted: 2018-11-11

## 2018-11-15 ENCOUNTER — TELEPHONE (OUTPATIENT)
Dept: INTERNAL MEDICINE CLINIC | Age: 58
End: 2018-11-15

## 2018-11-15 NOTE — TELEPHONE ENCOUNTER
Kristine from [address] [place] [address] left a form for this pt and I placed it in the [address] [place] [address]. Please fax to the number listed.     Thanks

## 2018-12-17 ENCOUNTER — TELEPHONE (OUTPATIENT)
Dept: INTERNAL MEDICINE CLINIC | Age: 58
End: 2018-12-17

## 2018-12-17 RX ORDER — OLMESARTAN MEDOXOMIL 20 MG/1
20 TABLET ORAL DAILY
Qty: 90 TAB | Refills: 1 | Status: SHIPPED | OUTPATIENT
Start: 2018-12-17 | End: 2019-11-05

## 2018-12-17 NOTE — TELEPHONE ENCOUNTER
Discussed with PCP- verbal order given for Benicar 20mg once daily. Orders Placed This Encounter    olmesartan (BENICAR) 20 mg tablet     Sig: Take 1 Tab by mouth daily.      Dispense:  90 Tab     Refill:  1     To replace valsartan       Luke Teague, PharmD, BCPS, CDE

## 2018-12-17 NOTE — TELEPHONE ENCOUNTER
Flaco Lopez, pharmacist at [facility] called stating having a hard time getting through to office. Her strength of Valsartan has been recalled again. She was taking 160mg. Wanted to see what we wanted to switch her to. Will discuss with PCP.     Taisha Fierro, PharmD, BCPS, CDE

## 2019-02-04 ENCOUNTER — OFFICE VISIT (OUTPATIENT)
Dept: INTERNAL MEDICINE CLINIC | Age: 59
End: 2019-02-04

## 2019-02-04 VITALS
RESPIRATION RATE: 18 BRPM | OXYGEN SATURATION: 96 % | TEMPERATURE: 98.5 F | SYSTOLIC BLOOD PRESSURE: 145 MMHG | HEIGHT: 67 IN | BODY MASS INDEX: 45.99 KG/M2 | HEART RATE: 99 BPM | DIASTOLIC BLOOD PRESSURE: 88 MMHG | WEIGHT: 293 LBS

## 2019-02-04 DIAGNOSIS — J40 BRONCHITIS: Primary | ICD-10-CM

## 2019-02-04 DIAGNOSIS — J98.01 BRONCHOSPASM: ICD-10-CM

## 2019-02-04 RX ORDER — PREDNISONE 20 MG/1
TABLET ORAL
Qty: 18 TAB | Refills: 0 | Status: SHIPPED | OUTPATIENT
Start: 2019-02-04 | End: 2019-02-14 | Stop reason: SDUPTHER

## 2019-02-04 RX ORDER — DOXYCYCLINE 100 MG/1
100 CAPSULE ORAL 2 TIMES DAILY
Qty: 14 CAP | Refills: 0 | Status: SHIPPED | OUTPATIENT
Start: 2019-02-04 | End: 2019-08-05 | Stop reason: ALTCHOICE

## 2019-02-04 RX ORDER — ALBUTEROL SULFATE 90 UG/1
2 AEROSOL, METERED RESPIRATORY (INHALATION)
Qty: 1 INHALER | Refills: 3 | Status: SHIPPED | OUTPATIENT
Start: 2019-02-04 | End: 2019-11-05

## 2019-02-04 RX ORDER — CODEINE PHOSPHATE AND GUAIFENESIN 10; 100 MG/5ML; MG/5ML
5 SOLUTION ORAL
Qty: 105 ML | Refills: 0 | Status: SHIPPED | OUTPATIENT
Start: 2019-02-04 | End: 2019-08-05 | Stop reason: ALTCHOICE

## 2019-02-04 RX ORDER — FLUTICASONE PROPIONATE AND SALMETEROL 250; 50 UG/1; UG/1
1 POWDER RESPIRATORY (INHALATION) 2 TIMES DAILY
Qty: 1 INHALER | Refills: 1 | Status: SHIPPED | OUTPATIENT
Start: 2019-02-04 | End: 2019-11-05

## 2019-02-04 RX ORDER — ESTRADIOL 1 MG/1
1 TABLET ORAL
COMMUNITY
Start: 2017-10-05 | End: 2019-04-18 | Stop reason: SDUPTHER

## 2019-02-04 RX ORDER — LEVALBUTEROL INHALATION SOLUTION 1.25 MG/3ML
1.25 SOLUTION RESPIRATORY (INHALATION)
Qty: 3 ML | Refills: 0
Start: 2019-02-04 | End: 2019-02-04

## 2019-02-04 NOTE — PATIENT INSTRUCTIONS
Bronchitis: Care Instructions  Your Care Instructions    Bronchitis is inflammation of the bronchial tubes, which carry air to the lungs. The tubes swell and produce mucus, or phlegm. The mucus and inflamed bronchial tubes make you cough. You may have trouble breathing. Most cases of bronchitis are caused by viruses like those that cause colds. Antibiotics usually do not help and they may be harmful. Bronchitis usually develops rapidly and lasts about 2 to 3 weeks in otherwise healthy people. Follow-up care is a key part of your treatment and safety. Be sure to make and go to all appointments, and call your doctor if you are having problems. It's also a good idea to know your test results and keep a list of the medicines you take. How can you care for yourself at home? · Take all medicines exactly as prescribed. Call your doctor if you think you are having a problem with your medicine. · Get some extra rest.  · Take an over-the-counter pain medicine, such as acetaminophen (Tylenol), ibuprofen (Advil, Motrin), or naproxen (Aleve) to reduce fever and relieve body aches. Read and follow all instructions on the label. · Do not take two or more pain medicines at the same time unless the doctor told you to. Many pain medicines have acetaminophen, which is Tylenol. Too much acetaminophen (Tylenol) can be harmful. · Take an over-the-counter cough medicine that contains dextromethorphan to help quiet a dry, hacking cough so that you can sleep. Avoid cough medicines that have more than one active ingredient. Read and follow all instructions on the label. · Breathe moist air from a humidifier, hot shower, or sink filled with hot water. The heat and moisture will thin mucus so you can cough it out. · Do not smoke. Smoking can make bronchitis worse. If you need help quitting, talk to your doctor about stop-smoking programs and medicines. These can increase your chances of quitting for good.   When should you call for help? Call 911 anytime you think you may need emergency care. For example, call if:    · You have severe trouble breathing.    Call your doctor now or seek immediate medical care if:    · You have new or worse trouble breathing.     · You cough up dark brown or bloody mucus (sputum).     · You have a new or higher fever.     · You have a new rash.    Watch closely for changes in your health, and be sure to contact your doctor if:    · You cough more deeply or more often, especially if you notice more mucus or a change in the color of your mucus.     · You are not getting better as expected. Where can you learn more? Go to http://lonnie-parrish.info/. Enter H333 in the search box to learn more about \"Bronchitis: Care Instructions. \"  Current as of: September 5, 2018  Content Version: 11.9  © 8982-9192 [employer] Care instructions adapted under license by [employer] (which disclaims liability or warranty for this information). If you have questions about a medical condition or this instruction, always ask your healthcare professional. [place] [address] any warranty or liability for your use of this information. Reactive Airway Disease: Care Instructions  Your Care Instructions    Reactive airway disease is a breathing problem that appears as wheezing, a whistling noise in your airways. It may be caused by a viral or bacterial infection, allergies, tobacco smoke, or something else in the environment. When you are around these triggers, your body releases chemicals that make the airways get tight. Reactive airway disease is a lot like asthma. Both can cause wheezing. But asthma is ongoing, while reactive airway disease may occur only now and then. Tests can be done to tell whether you have asthma. You may take the same medicines used to treat asthma. Good home care and follow-up care with your doctor can help you recover.   Follow-up care is a key part of your treatment and safety. Be sure to make and go to all appointments, and call your doctor if you are having problems. It's also a good idea to know your test results and keep a list of the medicines you take. How can you care for yourself at home? · Take your medicines exactly as prescribed. Call your doctor if you think you are having a problem with your medicine. · Do not smoke or allow others to smoke around you. If you need help quitting, talk to your doctor about stop-smoking programs and medicines. These can increase your chances of quitting for good. · If you know what caused your wheezing (such as perfume or the odor of household chemicals), try to avoid it in the future. · Wash your hands several times a day, and consider using hand gels or wipes that contain alcohol. This can prevent colds and other infections. When should you call for help? Call 911 anytime you think you may need emergency care. For example, call if:    · You have severe trouble breathing.    Watch closely for changes in your health, and be sure to contact your doctor if:    · You cough up yellow, dark brown, or bloody mucus.     · You have a fever.     · Your wheezing gets worse. Where can you learn more? Go to http://lonnie-parrish.info/. Enter B355 in the search box to learn more about \"Reactive Airway Disease: Care Instructions. \"  Current as of: September 5, 2018  Content Version: 11.9  © 8899-9335 [employer]. Care instructions adapted under license by [employer] (which disclaims liability or warranty for this information). If you have questions about a medical condition or this instruction, always ask your healthcare professional. [address] any warranty or liability for your use of this information.

## 2019-02-04 NOTE — PROGRESS NOTES
HISTORY OF PRESENT ILLNESS  Fabi Ruiz is a 66 y.o. female. HPI  Presents with complaints of sore throat, head congestion that began 4 days ago and has rapidly moved into upper chest.  Now having productive cough of thick purulent yellow/brown sputum and shortness of breath, wheezing. Denies fever but has felt chilled on occasion. Cough has been disturbing her sleep at night. Has pro-air inhaler that she has used in past for reactive airway associated with bronchitis but her current inhaler has . Has taken Mucinex OTC with some improvement. Has tendency to develop bronchitis especially in winter months. Reports a coworker was ill with similar symptoms. Patient Active Problem List   Diagnosis Code    HTN (hypertension) I10    Hypothyroidism E03.9    AR (allergic rhinitis) J30.9    Impaired fasting glucose R73.01    Biliary dyskinesia K82.8    Endometriosis N80.9    Hyperlipidemia with target LDL less than 130 E78.5    VSD (ventricular septal defect) Q21.0    Menopausal disorder N95.9    Depression F32.9    Gluten intolerance K90.41    Vitamin D deficiency E55.9    MR (mitral regurgitation) I34.0    Sebaceous cyst of skin of breast N60.89    Sebaceous cyst of breast N60.89    Skin abnormalities L98.9    Environmental allergies Z91.09    Obesity, morbid ([address] E66.01    Stress incontinence of urine N39.3    FRANSISCA (obstructive sleep apnea) G47.33     Past Surgical History:   Procedure Laterality Date    COLONOSCOPY  2009    Dr. Bee Ch  2010    left, had lipoma removed.   Dr. Matilda Agustin    HX COLONOSCOPY  2015    normal.  Dr. Rojas [address]    HX CYST REMOVAL  2017    Dr. Karlos Cottrell HX ENDOSCOPY  2015    gastritis    HX HEART CATHETERIZATION      age 5, age 11 due to VSD    HX LAP CHOLECYSTECTOMY  2014    Dr. Gaetano Nyhan Rose/Maynorpenham and adhesions    HX LASHANDA AND BSO  10/2008    HX TONSILLECTOMY       Social History Socioeconomic History    Marital status: SINGLE     Spouse name: Not on file    Number of children: 0    Years of education: Not on file    Highest education level: Not on file   Social Needs    Financial resource strain: Not on file    Food insecurity - worry: Not on file    Food insecurity - inability: Not on file    Transportation needs - medical: Not on file   TheraSim needs - non-medical: Not on file   Occupational History    Not on file   Tobacco Use    Smoking status: Never Smoker    Smokeless tobacco: Never Used   Substance and Sexual Activity    Alcohol use: No    Drug use: No    Sexual activity: Not Currently   Other Topics Concern    Not on file   Social History Narrative    Not on file     Family History   Problem Relation Age of Onset    Cancer Mother         breast age 48    Stroke Mother     Heart Disease Father         CABGx4    High Cholesterol Father     Deep Vein Thrombosis Father         left arm    Diabetes Maternal Grandmother     Heart Disease Paternal Grandfather     High Cholesterol Paternal Grandfather     Heart Attack Paternal Grandfather     Arthritis-rheumatoid Paternal Grandmother     Thyroid Disease Paternal Grandmother     Osteoporosis Maternal Aunt      Current Outpatient Medications   Medication Sig    estradiol (ESTRACE) 1 mg tablet Take 1 mg by mouth.  levalbuterol (XOPENEX) 1.25 mg/3 mL nebu 3 mL by Nebulization route now for 1 dose.  guaiFENesin-codeine (ROBITUSSIN AC) 100-10 mg/5 mL solution Take 5 mL by mouth three (3) times daily as needed for Cough. Max Daily Amount: 15 mL.  doxycycline (VIBRAMYCIN) 100 mg capsule Take 1 Cap by mouth two (2) times a day.  guaiFENesin-dextromethorphan SR (MUCINEX DM) 600-30 mg per tablet Take 1 Tab by mouth two (2) times a day.     predniSONE (DELTASONE) 20 mg tablet Take 3 pills for 3 days, then 2 pills for 3 days, then 1 pill for 3 days    fluticasone-salmeterol (ADVAIR) 250-50 mcg/dose diskus inhaler Take 1 Puff by inhalation two (2) times a day.  albuterol (PROVENTIL HFA, VENTOLIN HFA, PROAIR HFA) 90 mcg/actuation inhaler Take 2 Puffs by inhalation every four (4) hours as needed for Wheezing.  olmesartan (BENICAR) 20 mg tablet Take 1 Tab by mouth daily.  magnesium oxide (MAG-OX) 400 mg tablet Take 400 mg by mouth daily.  ferrous sulfate (IRON) 325 mg (65 mg iron) tablet Take  by mouth Daily (before breakfast).  hydroCHLOROthiazide (HYDRODIURIL) 25 mg tablet TAKE 1 TABLET BY MOUTH EVERY DAY    SYNTHROID 150 mcg tablet taking 162 mcg daily    ZINC PO Take 1 Tab by mouth daily.  ASCORBATE CALCIUM (VITAMIN C PO) Take 1 Tab by mouth daily.  cholecalciferol, vitamin D3, (VITAMIN D3) 4,000 unit cap Take 1 Tab by mouth daily.  VITAMIN B COMPLEX PO Take 1 Tab by mouth daily.  traMADol (ULTRAM) 50 mg tablet Take 1 Tab by mouth every six (6) hours as needed for Pain. Max Daily Amount: 200 mg. No current facility-administered medications for this visit. Allergies   Allergen Reactions    Effexor [Venlafaxine] Other (comments)     Htn,nose bleeding,increase in anxiety    Gluten Other (comments)     Irritable bowel symptoms, bloating     Levaquin [Levofloxacin] Myalgia     Per pt muscle weakness    Lisinopril Diarrhea    Metformin Diarrhea    Other Medication Swelling     Family of grains     Immunization History   Administered Date(s) Administered    Influenza Vaccine 08/24/2016, 08/01/2017, 10/18/2018    Influenza Vaccine Whole 09/21/2010    Pneumococcal Polysaccharide (PPSV-23) 11/01/2018    TD Vaccine 03/05/2006    TDAP Vaccine 08/31/2011       Review of Systems   Constitutional: Positive for chills and malaise/fatigue. Negative for fever. HENT: Positive for congestion and sore throat. Respiratory: Positive for cough, sputum production, shortness of breath and wheezing. Cardiovascular: Negative for chest pain and palpitations.    Gastrointestinal: Negative for nausea and vomiting. Musculoskeletal: Negative for myalgias. Skin: Negative for rash. Neurological: Positive for headaches. Negative for dizziness. /88 (BP 1 Location: Left arm, BP Patient Position: Sitting)   Pulse 99   Temp 98.5 °F (36.9 °C) (Oral)   Resp 18   Ht 5' 7\" (1.702 m)   Wt 324 lb (147 kg)   LMP 08/20/2000   SpO2 96%   BMI 50.75 kg/m²   Physical Exam   Constitutional: She is oriented to person, place, and time. She appears well-developed and well-nourished. Appears tired. HENT:   Head: Normocephalic and atraumatic. Right Ear: External ear normal.   Left Ear: External ear normal.   Nose: Nose normal.   Mouth/Throat: Posterior oropharyngeal erythema present. No posterior oropharyngeal edema. Neck: Normal range of motion. Neck supple. No thyromegaly present. Cardiovascular: Normal rate and regular rhythm. Pulmonary/Chest: Effort normal. She has wheezes. She has no rales. Upper chest congestion with loose cough of thick purulent sputum; scattered wheezes throughout all lung fields. Musculoskeletal: Normal range of motion. Lymphadenopathy:     She has no cervical adenopathy. Neurological: She is alert and oriented to person, place, and time. Psychiatric: She has a normal mood and affect. Her behavior is normal.   Nursing note and vitals reviewed. ASSESSMENT and PLAN  Diagnoses and all orders for this visit:    1. Bronchitis  -     guaiFENesin-codeine (ROBITUSSIN AC) 100-10 mg/5 mL solution; Take 5 mL by mouth three (3) times daily as needed for Cough. Max Daily Amount: 15 mL. -     doxycycline (VIBRAMYCIN) 100 mg capsule; Take 1 Cap by mouth two (2) times a day. -     guaiFENesin-dextromethorphan SR (MUCINEX DM) 600-30 mg per tablet; Take 1 Tab by mouth two (2) times a day.     2. Bronchospasm --Xopenex nebulizer treatment given in office with improvement of airflow and wheezing.  -     LEVALBUTEROL, INHAL. SOL., FDA-APPROVED FINAL, NON-COMPOUND UNIT DOSE, 0.5 MG  -     levalbuterol (XOPENEX) 1.25 mg/3 mL nebu; 3 mL by Nebulization route now for 1 dose. -     MS PRESSURIZED/NONPRESSURIZED INHALATION TREATMENT  -     predniSONE (DELTASONE) 20 mg tablet; Take 3 pills for 3 days, then 2 pills for 3 days, then 1 pill for 3 days  -     fluticasone-salmeterol (ADVAIR) 250-50 mcg/dose diskus inhaler; Take 1 Puff by inhalation two (2) times a day. -     albuterol (PROVENTIL HFA, VENTOLIN HFA, PROAIR HFA) 90 mcg/actuation inhaler; Take 2 Puffs by inhalation every four (4) hours as needed for Wheezing. Follow up if signs and symptoms worsen or change. After hours number given.    lab results and schedule of future lab studies reviewed with patient  reviewed diet, exercise and weight control  reviewed medications and side effects in detail

## 2019-02-04 NOTE — LETTER
NOTIFICATION RETURN TO WORK / SCHOOL 
 
2/4/2019 2:10 PM 
 
Ms. Mukul Conley [place] [id number] To Whom It May Concern: 
 
Mukul Conley is currently under the care of [address] [place] [address],[address]. She was seen in office today for medical illness and was advised to remain out of work until 2/6/2019 She will return to work/school on: 2/6/2019 If there are questions or concerns please have the patient contact our office.  
 
 
 
Sincerely, 
 
 
Naima Avila NP

## 2019-02-13 RX ORDER — CEFDINIR 300 MG/1
300 CAPSULE ORAL 2 TIMES DAILY
Qty: 14 CAP | Refills: 0 | Status: SHIPPED | OUTPATIENT
Start: 2019-02-13 | End: 2019-08-05 | Stop reason: ALTCHOICE

## 2019-02-14 DIAGNOSIS — J98.01 BRONCHOSPASM: ICD-10-CM

## 2019-02-14 RX ORDER — PREDNISONE 20 MG/1
TABLET ORAL
Qty: 18 TAB | Refills: 0 | Status: SHIPPED | OUTPATIENT
Start: 2019-02-14 | End: 2019-02-23

## 2019-04-03 NOTE — MR AVS SNAPSHOT
[address] [place] [address] [place] [address] [address]  Phone: [phone]  Fax: [phone]    Narayan Zepeda MD        April 5, 2019     Patient: Giovanni Saldivar   YOB: 1948   Date of Visit: 4/3/2019       To Whom It May Concern: It is my medical opinion that Nubia Lee can stop warfarin five days before procedure. No need for Lovenox bridging. If you have any questions or concerns, please don't hesitate to call.     Sincerely,        Narayan Zepeda MD Visit Information Date & Time Provider Department Dept. Phone Encounter #  
 4/6/2017  8:15 AM Steff Platt MD [facility] [address] [id number] Upcoming Health Maintenance Date Due  
 PAP AKA CERVICAL CYTOLOGY 5/7/2018 BREAST CANCER SCRN MAMMOGRAM 6/16/2018 DTaP/Tdap/Td series (2 - Td) 8/31/2021 COLONOSCOPY 7/29/2025 Allergies as of 4/6/2017  Review Complete On: 4/6/2017 By: Steff Platt MD  
  
 Severity Noted Reaction Type Reactions Effexor [Venlafaxine]  03/05/2009    Other (comments) Htn,nose bleeding,increase in anxiety Gluten  02/24/2016    Other (comments) Irritable bowel symptoms, bloating Levaquin [Levofloxacin]  02/22/2010   Side Effect Myalgia Per pt muscle weakness Lisinopril  08/31/2012    Diarrhea Metformin  08/12/2014    Diarrhea Other Medication  10/07/2010   Side Effect Swelling Family of grains Current Immunizations  Reviewed on 9/6/2016 Name Date Influenza Vaccine 8/24/2016 Influenza Vaccine Whole 9/21/2010 TD Vaccine 3/5/2006 TDAP Vaccine 8/31/2011 Not reviewed this visit You Were Diagnosed With   
  
 Codes Comments Environmental allergies    -  Primary ICD-10-CM: Z91.09 
ICD-9-CM: V15.09 Essential hypertension     ICD-10-CM: I10 
ICD-9-CM: 401.9 Vitals BP Pulse Temp Resp Height(growth percentile) Weight(growth percentile) 135/79 (BP 1 Location: Left arm, BP Patient Position: Sitting) 90 97.8 °F (36.6 °C) (Oral) 12 5' 7\" (1.702 m) 304 lb (137.9 kg) LMP SpO2 BMI OB Status Smoking Status 08/20/2000 98% 47.61 kg/m2 Hysterectomy Never Smoker BMI and BSA Data Body Mass Index Body Surface Area  
 47.61 kg/m 2 2.55 m 2 Preferred Pharmacy Pharmacy Name Phone [phone] [facility] [place] [facility] [phone] Your Updated Medication List  
  
   
 This list is accurate as of: 4/6/17  8:50 AM.  Always use your most recent med list.  
  
  
  
  
 albuterol 90 mcg/actuation inhaler Commonly known as:  PROAIR HFA Take 1 Puff by inhalation every four (4) hours as needed for Wheezing or Shortness of Breath.  
  
 estradiol 1 mg tablet Commonly known as:  ESTRACE Take 1 Tab by mouth daily. fexofenadine 180 mg tablet Commonly known as:  [place] Take 1 Tab by mouth daily. fluticasone 50 mcg/actuation nasal spray Commonly known as:  [place] Longest 2 Sprays by Both Nostrils route daily. hydroCHLOROthiazide 25 mg tablet Commonly known as:  HYDRODIURIL  
TAKE 1 TABLET BY MOUTH EVERY DAY FOR HIGH BLOOD PRESSURE  
  
 * levothyroxine 150 mcg tablet Commonly known as:  SYNTHROID Taking one pill 6 days per week * SYNTHROID 175 mcg tablet Generic drug:  levothyroxine Take 1 pill once weekly  
  
 multivits,Stress Formula-Zinc tablet Take 1 Tab by mouth daily. valsartan 160 mg tablet Commonly known as:  DIOVAN Take 1 Tab by mouth daily. VITAMIN B COMPLEX PO Take  by mouth. VITAMIN C PO Take  by mouth. VITAMIN D3 4,000 unit Cap Generic drug:  cholecalciferol (vitamin D3) Take  by mouth. * Notice: This list has 2 medication(s) that are the same as other medications prescribed for you. Read the directions carefully, and ask your doctor or other care provider to review them with you. Patient Instructions Allergy treatments 1 - Nasal saline rinse daily (try NeilMed sinus rinse) 2- Take steroid nasal spray (fluticasone, Nasonex, Nasocort) 3 - Allegra (fexofenadine) daily. 4 - Can add benadryl 25-50 mg at night for allergies/congestion and sleep Introducing [facility]! Dear Park Grey: Thank you for requesting a [employer] account. Our records indicate that you already have an active [employer] account.   You can access your account anytime at [email or web address] Did you know that you can access your hospital and ER discharge instructions at any time in [facility]? You can also review all of your test results from your hospital stay or ER visit. Additional Information If you have questions, please visit the Frequently Asked Questions section of the [facility] website at [email or web address] Remember, [facility] is NOT to be used for urgent needs. For medical emergencies, dial 911. Now available from your iPhone and Android! Please provide this summary of care documentation to your next provider. Your primary care clinician is listed as An Miller. If you have any questions after today's visit, please call [phone].

## 2019-04-18 RX ORDER — ESTRADIOL 1 MG/1
1 TABLET ORAL DAILY
Qty: 30 TAB | Refills: 6 | Status: SHIPPED | OUTPATIENT
Start: 2019-04-18 | End: 2019-11-05 | Stop reason: SDUPTHER

## 2019-08-05 ENCOUNTER — OFFICE VISIT (OUTPATIENT)
Dept: INTERNAL MEDICINE CLINIC | Age: 59
End: 2019-08-05

## 2019-08-05 VITALS
HEIGHT: 67 IN | TEMPERATURE: 98.8 F | HEART RATE: 84 BPM | SYSTOLIC BLOOD PRESSURE: 123 MMHG | WEIGHT: 293 LBS | BODY MASS INDEX: 45.99 KG/M2 | DIASTOLIC BLOOD PRESSURE: 70 MMHG | RESPIRATION RATE: 18 BRPM | OXYGEN SATURATION: 99 %

## 2019-08-05 DIAGNOSIS — I10 ESSENTIAL HYPERTENSION: ICD-10-CM

## 2019-08-05 DIAGNOSIS — G89.4 CHRONIC PAIN SYNDROME: ICD-10-CM

## 2019-08-05 DIAGNOSIS — L03.317 CELLULITIS OF BUTTOCK: ICD-10-CM

## 2019-08-05 DIAGNOSIS — N39.3 STRESS INCONTINENCE IN FEMALE: ICD-10-CM

## 2019-08-05 DIAGNOSIS — Z98.890 HISTORY OF EXCISION OF PILONIDAL CYST: Primary | ICD-10-CM

## 2019-08-05 RX ORDER — AMOXICILLIN AND CLAVULANATE POTASSIUM 875; 125 MG/1; MG/1
1 TABLET, FILM COATED ORAL 2 TIMES DAILY
Qty: 14 TAB | Refills: 0 | Status: SHIPPED | OUTPATIENT
Start: 2019-08-05 | End: 2019-11-05 | Stop reason: ALTCHOICE

## 2019-08-05 RX ORDER — FLUCONAZOLE 150 MG/1
150 TABLET ORAL DAILY
Qty: 1 TAB | Refills: 0 | Status: SHIPPED | OUTPATIENT
Start: 2019-08-05 | End: 2019-08-06

## 2019-08-05 RX ORDER — TRAMADOL HYDROCHLORIDE 50 MG/1
50 TABLET ORAL
Qty: 60 TAB | Refills: 0 | Status: SHIPPED | OUTPATIENT
Start: 2019-08-05 | End: 2020-02-12 | Stop reason: SDUPTHER

## 2019-08-05 NOTE — PATIENT INSTRUCTIONS
Stress Incontinence in Women: Care Instructions  Your Care Instructions  Stress incontinence is the accidental release of urine caused by activities that put pressure on your bladder. It may happen most often when you sneeze, cough, laugh, jog, or lift something heavy. This condition does not cause major health problems, but it can be embarrassing and interfere with your life. Treatment can cure or improve your symptoms. Follow-up care is a key part of your treatment and safety. Be sure to make and go to all appointments, and call your doctor if you are having problems. It's also a good idea to know your test results and keep a list of the medicines you take. How can you care for yourself at home? · Take your medicines exactly as prescribed. Call your doctor if you think you are having a problem with your medicine. · Limit caffeine and alcohol. They make you urinate more. · Do pelvic floor (Kegel) exercises, which tighten and strengthen pelvic muscles. To do Kegel exercises:  ? Squeeze the same muscles you would use to stop your urine. Your belly and thighs should not move. ? Hold the squeeze for 3 seconds, and then relax for 3 seconds. ? Start with 3 seconds. Then add 1 second each week until you are able to squeeze for 10 seconds. ? Repeat the exercise 10 to 15 times a session. Do three or more sessions a day. · Try wearing pads that absorb leaks. Or you may want to try to prevent leaks with a product like Poise Impressa, which you insert like a tampon. · Keep skin in the genital area dry. Petroleum jelly (like Vaseline) spread on the area may help protect your skin. When should you call for help? Call your doctor now or seek immediate medical care if:    · You have new urinary symptoms.  These may include leaking urine, having pain when urinating, or feeling like you need to urinate often.    Watch closely for changes in your health, and be sure to contact your doctor if:    · You do not get better as expected. Where can you learn more? Go to http://lonnie-parrish.info/. Enter I714 in the search box to learn more about \"Stress Incontinence in Women: Care Instructions. \"  Current as of: February 19, 2019  Content Version: 12.1  © 5494-2418 [employer]. Care instructions adapted under license by [employer] (which disclaims liability or warranty for this information). If you have questions about a medical condition or this instruction, always ask your healthcare professional. [address] any warranty or liability for your use of this information.

## 2019-08-06 NOTE — PROGRESS NOTES
HISTORY OF PRESENT ILLNESS  Alfonso James is a 66 y.o. female. HPI  Presents with complaints of tenderness and redness at upper gluteal cleft for the past several days. Noted increased irritation and some red streaking at site of incision from previous pilonidal cyst excision. Has not noted any drainage from area. Denies fever, chills, abdominal pain. Has applied some Bacitracin ointment to area for the past 2 days without much improvement. Sees Dr Joy Winslow for management of hypothyroidism and reports she mentioned at her last appointment that she was having increased fatigue and edema in legs; was sent to [facility] where she saw Dr Avani Wu and had stress testing which was normal.  Her Olmesartan was increased from 20 mg to 40 mg and her blood pressure has been better controlled. Denies any chest pressure or pain, shortness of breath, diaphoresis. Requesting refill of Tramadol that she takes as needed for various joint pains. Was last refilled in 11/2018. She avoids NSAIDS due to fluid retention. Takes Tylenol but this does not help on occasion and she then takes the Tramadol which gives her some relief. Complains of worsening stress incontinence over the years. She finds she will leak significantly with coughing and laughing which is becoming a source of embarrassment for her. She would like to see a specialist at this point. Denies urge incontinence.     Patient Active Problem List   Diagnosis Code    HTN (hypertension) I10    Hypothyroidism E03.9    AR (allergic rhinitis) J30.9    Impaired fasting glucose R73.01    Biliary dyskinesia K82.8    Endometriosis N80.9    Hyperlipidemia with target LDL less than 130 E78.5    VSD (ventricular septal defect) Q21.0    Menopausal disorder N95.9    Depression F32.9    Gluten intolerance K90.41    Vitamin D deficiency E55.9    MR (mitral regurgitation) I34.0    Sebaceous cyst of skin of breast N60.89    Sebaceous cyst of breast N60.89    Skin abnormalities L98.9    Environmental allergies Z91.09    Obesity, morbid (HCC) E66.01    Stress incontinence of urine N39.3    FRANSISCA (obstructive sleep apnea) G47.33     Past Surgical History:   Procedure Laterality Date    COLONOSCOPY  9/2009    Dr. Cas Barfield  12/30/2010    left, had lipoma removed.   Dr. Denilson Del Cid    HX COLONOSCOPY  07/29/2015    normal.  Dr. Romayne Gray     [address]    HX CYST REMOVAL  06/2017    Dr. Alphonso Almanza HX ENDOSCOPY  07/29/2015    gastritis    HX HEART CATHETERIZATION      age 5, age 11 due to VSD    HX LAP CHOLECYSTECTOMY  8/01/2014    Dr. Kristie Medina/Kun and adhesions    HX LASHANDA AND BSO  10/2008    HX TONSILLECTOMY       Social History     Socioeconomic History    Marital status: SINGLE     Spouse name: Not on file    Number of children: 0    Years of education: Not on file    Highest education level: Not on file   Occupational History    Not on file   Social Needs    Financial resource strain: Not on file    Food insecurity:     Worry: Not on file     Inability: Not on file    Transportation needs:     Medical: Not on file     Non-medical: Not on file   Tobacco Use    Smoking status: Never Smoker    Smokeless tobacco: Never Used   Substance and Sexual Activity    Alcohol use: No    Drug use: No    Sexual activity: Not Currently   Lifestyle    Physical activity:     Days per week: Not on file     Minutes per session: Not on file    Stress: Not on file   Relationships    Social connections:     Talks on phone: Not on file     Gets together: Not on file     Attends Restoration service: Not on file     Active member of club or organization: Not on file     Attends meetings of clubs or organizations: Not on file     Relationship status: Not on file    Intimate partner violence:     Fear of current or ex partner: Not on file     Emotionally abused: Not on file     Physically abused: Not on file     Forced sexual activity: Not on file   Other Topics Concern    Not on file   Social History Narrative    Not on file     Family History   Problem Relation Age of Onset    Cancer Mother         breast age 48    Stroke Mother     Heart Disease Father         CABGx4    High Cholesterol Father     Deep Vein Thrombosis Father         left arm    Diabetes Maternal Grandmother     Heart Disease Paternal Grandfather     High Cholesterol Paternal Grandfather     Heart Attack Paternal Grandfather     Arthritis-rheumatoid Paternal Grandmother     Thyroid Disease Paternal Grandmother     Osteoporosis Maternal Aunt      Current Outpatient Medications   Medication Sig    traMADol (ULTRAM) 50 mg tablet Take 1 Tab by mouth every six (6) hours as needed for Pain for up to 30 days. Max Daily Amount: 200 mg.    amoxicillin-clavulanate (AUGMENTIN) 875-125 mg per tablet Take 1 Tab by mouth two (2) times a day.  fluconazole (DIFLUCAN) 150 mg tablet Take 1 Tab by mouth daily for 1 day. FDA advises cautious prescribing of oral fluconazole in pregnancy.  estradiol (ESTRACE) 1 mg tablet Take 1 Tab by mouth daily.  hydroCHLOROthiazide (HYDRODIURIL) 25 mg tablet TAKE 1 TABLET BY MOUTH EVERY DAY    albuterol (PROVENTIL HFA, VENTOLIN HFA, PROAIR HFA) 90 mcg/actuation inhaler Take 2 Puffs by inhalation every four (4) hours as needed for Wheezing.  olmesartan (BENICAR) 20 mg tablet Take 1 Tab by mouth daily. (Patient taking differently: Take 40 mg by mouth daily.)    magnesium oxide (MAG-OX) 400 mg tablet Take 400 mg by mouth daily.  ferrous sulfate (IRON) 325 mg (65 mg iron) tablet Take  by mouth Daily (before breakfast).  SYNTHROID 150 mcg tablet Take 162 mcg by mouth Daily (before breakfast).  ZINC PO Take 1 Tab by mouth daily.  ASCORBATE CALCIUM (VITAMIN C PO) Take 1 Tab by mouth daily.  cholecalciferol, vitamin D3, (VITAMIN D3) 4,000 unit cap Take 1 Tab by mouth daily.     VITAMIN B COMPLEX PO Take 1 Tab by mouth daily.    fluticasone-salmeterol (ADVAIR) 250-50 mcg/dose diskus inhaler Take 1 Puff by inhalation two (2) times a day. (Patient taking differently: Take 1 Puff by inhalation two (2) times daily as needed.)     No current facility-administered medications for this visit. Allergies   Allergen Reactions    Effexor [Venlafaxine] Other (comments)     Htn,nose bleeding,increase in anxiety    Gluten Other (comments)     Irritable bowel symptoms, bloating     Levaquin [Levofloxacin] Myalgia     Per pt muscle weakness    Lisinopril Diarrhea    Metformin Diarrhea    Other Medication Swelling     Family of grains     Immunization History   Administered Date(s) Administered    Influenza Vaccine 08/24/2016, 08/01/2017, 10/18/2018    Influenza Vaccine Whole 09/21/2010    Pneumococcal Polysaccharide (PPSV-23) 11/01/2018    TD Vaccine 03/05/2006    TDAP Vaccine 08/31/2011         Review of Systems   Constitutional: Negative for chills, fever and malaise/fatigue. HENT: Negative for congestion and sore throat. Respiratory: Negative for cough and shortness of breath. Cardiovascular: Negative for chest pain and palpitations. Gastrointestinal: Negative for abdominal pain, constipation, diarrhea, nausea and vomiting. Genitourinary: Negative for dysuria, frequency and hematuria. Musculoskeletal: Negative for myalgias. Neurological: Negative for dizziness and headaches. /70 (BP 1 Location: Left arm, BP Patient Position: Sitting)   Pulse 84   Temp 98.8 °F (37.1 °C) (Oral)   Resp 18   Ht 5' 7\" (1.702 m)   Wt 321 lb (145.6 kg)   LMP 08/20/2000   SpO2 99%   BMI 50.28 kg/m²   Physical Exam   Constitutional: She is oriented to person, place, and time. She appears well-developed and well-nourished. HENT:   Head: Normocephalic and atraumatic. Neck: Normal range of motion. Neck supple. No thyromegaly present. Cardiovascular: Normal rate and regular rhythm.    Pulmonary/Chest: Effort normal and breath sounds normal. She has no wheezes. Lymphadenopathy:     She has no cervical adenopathy. Neurological: She is alert and oriented to person, place, and time. Skin:        Erythema and warmth to upper portion of gluteal cleft   Psychiatric: She has a normal mood and affect. Her behavior is normal.   Nursing note and vitals reviewed. ASSESSMENT and PLAN  Diagnoses and all orders for this visit:    1. History of excision of pilonidal cyst    2. Cellulitis of buttock -- if fails to improve, will send her back to surgeon for further evaluation. -     amoxicillin-clavulanate (AUGMENTIN) 875-125 mg per tablet; Take 1 Tab by mouth two (2) times a day. -     fluconazole (DIFLUCAN) 150 mg tablet; Take 1 Tab by mouth daily for 1 day. FDA advises cautious prescribing of oral fluconazole in pregnancy. 3. Essential hypertension -- better controlled with increased dose of Olmesartan; had normal stress testing in June 2019.    4. Chronic pain syndrome  -     traMADol (ULTRAM) 50 mg tablet; Take 1 Tab by mouth every six (6) hours as needed for Pain for up to 30 days.  Max Daily Amount: 200 mg.    5. Stress incontinence in female  -     REFERRAL TO FEMALE PELVIC MEDICINE AND RECONSTRUCTIVE SURGERY  -     REFERRAL TO UROLOGY      lab results and schedule of future lab studies reviewed with patient  reviewed diet, exercise and weight control  reviewed medications and side effects in detail

## 2019-11-03 RX ORDER — HYDROCHLOROTHIAZIDE 25 MG/1
TABLET ORAL
Qty: 30 TAB | Refills: 5 | Status: SHIPPED | OUTPATIENT
Start: 2019-11-03 | End: 2020-05-01 | Stop reason: SDUPTHER

## 2019-11-05 ENCOUNTER — OFFICE VISIT (OUTPATIENT)
Dept: OBGYN CLINIC | Age: 59
End: 2019-11-05

## 2019-11-05 VITALS — WEIGHT: 293 LBS | BODY MASS INDEX: 50.9 KG/M2 | SYSTOLIC BLOOD PRESSURE: 134 MMHG | DIASTOLIC BLOOD PRESSURE: 88 MMHG

## 2019-11-05 DIAGNOSIS — Z01.419 WELL WOMAN EXAM WITH ROUTINE GYNECOLOGICAL EXAM: Primary | ICD-10-CM

## 2019-11-05 RX ORDER — LEVOTHYROXINE SODIUM 25 UG/1
12.5 TABLET ORAL
COMMUNITY
End: 2021-03-23 | Stop reason: ALTCHOICE

## 2019-11-05 RX ORDER — OLMESARTAN MEDOXOMIL 40 MG/1
TABLET ORAL
COMMUNITY

## 2019-11-05 RX ORDER — ESTRADIOL 1 MG/1
TABLET ORAL
Qty: 30 TAB | Refills: 6 | Status: SHIPPED | OUTPATIENT
Start: 2019-11-05 | End: 2020-05-27

## 2019-11-05 RX ORDER — FLUCONAZOLE 100 MG/1
100 TABLET ORAL DAILY
Qty: 7 TAB | Refills: 0 | Status: SHIPPED | OUTPATIENT
Start: 2019-11-05 | End: 2019-11-12

## 2019-11-05 RX ORDER — ESTRADIOL 1 MG/1
TABLET ORAL
COMMUNITY
End: 2019-11-05 | Stop reason: SDUPTHER

## 2019-11-05 NOTE — PROGRESS NOTES
Annual exam ages 40-58      Chris Cruz is a No obstetric history on file. ,  64 y.o. female   Patient's last menstrual period was 08/20/2000. She presents for her annual checkup. She is having no significant problems. With regard to the Gardasil vaccine, she is older than the FDA approved age to receive it. Menstrual status:    Her periods are nonexistent in flow. Contraception:    The current method of family planning is NA post menopause. Hormonal status:  She reports no perimenstrual type symptoms. She is not having vasomotor symptoms. The patient is not using any ERT. Sexual history:    She  reports that she does not currently engage in sexual activity. Medical conditions:    Since her last annual GYN exam about one year ago, she has not the following changes in her health history: none. Surgical history confirmed with patient. has a past surgical history that includes hx tonsillectomy; pr femur/knee surg unlisted (12/30/2010); colonoscopy (9/2009); hx lap cholecystectomy (8/01/2014); hx colonoscopy (07/29/2015); hx endoscopy (07/29/2015); hx cyst removal (1983); hx heart catheterization; hx cyst removal (06/2017); hx hysterectomy; and hx salpingo-oophorectomy. Pap and Mammogram History:    Her most recent Pap smear was normal, obtained 1 year(s) ago. The patient had her mammogram today in our office. Breast Cancer History/Substance Abuse: negative      Osteoporosis History:    Family history does not include a first or second degree relative with osteopenia or osteoporosis. A bone density scan has not been done. Past Medical History:   Diagnosis Date    AR (allergic rhinitis)     Dr. Courtney Sanchez dyskinesia     s/p azeb    Cancer [facility])     BCC skin cancer right ankle    Depression     Endometriosis     LASHANDA 10/2008 Dr. Mandel Cea Environmental allergies 10/2010    oats, barley, cotton seed.   Dr. Fortunato Chen    Gluten intolerance     HTN (hypertension)  Hyperlipidemia LDL goal < 130     Hypothyroidism     Dr. Giacomo Swan    Impaired fasting glucose     105 7/2008    Menopausal disorder     MR (mitral regurgitation)     mild    FRANSISCA (obstructive sleep apnea) 11/11/2018    mild. [facility]    Sebaceous cyst of breast     Dr. Raf Odom Skin abnormalities 8/2010    pre-cancerous lesions of face, Dr. Adriel Miramontes D deficiency     VSD (ventricular septal defect)     self-closed age 11, TTE normal 3/09     Past Surgical History:   Procedure Laterality Date    COLONOSCOPY  9/2009    Dr. Lucy Villatoro  12/30/2010    left, had lipoma removed. Dr. Ailin Caban    HX COLONOSCOPY  07/29/2015    normal.  Dr. Radha Juarez [address]    HX CYST REMOVAL  06/2017    Dr. Rogerio Burton HX ENDOSCOPY  07/29/2015    gastritis    HX HEART CATHETERIZATION      age 5, age 11 due to VSD    HX HYSTERECTOMY      supracervical    HX LAP CHOLECYSTECTOMY  8/01/2014    Dr. Tao Matters Carol/[place] and adhesions    HX SALPINGO-OOPHORECTOMY      HX TONSILLECTOMY         Current Outpatient Medications   Medication Sig Dispense Refill    estradiol (ESTRACE) 1 mg tablet TAKE 1 TABLET BY MOUTH EVERY DAY 30 Tab 6    olmesartan (BENICAR) 40 mg tablet olmesartan 40 mg tablet      levothyroxine (SYNTHROID) 25 mcg tablet Synthroid 25 mcg tablet      fluconazole (DIFLUCAN) 100 mg tablet Take 1 Tab by mouth daily for 7 days. FDA advises cautious prescribing of oral fluconazole in pregnancy. 7 Tab 0    hydroCHLOROthiazide (HYDRODIURIL) 25 mg tablet TAKE 1 TABLET BY MOUTH EVERY DAY 30 Tab 5    magnesium oxide (MAG-OX) 400 mg tablet Take 400 mg by mouth daily.  ferrous sulfate (IRON) 325 mg (65 mg iron) tablet Take  by mouth Daily (before breakfast).  SYNTHROID 150 mcg tablet Take 162 mcg by mouth Daily (before breakfast). 6    ZINC PO Take 1 Tab by mouth daily.  ASCORBATE CALCIUM (VITAMIN C PO) Take 1 Tab by mouth daily.       cholecalciferol, vitamin D3, (VITAMIN D3) 4,000 unit cap Take 1 Tab by mouth daily.  VITAMIN B COMPLEX PO Take 1 Tab by mouth daily. Allergies: Effexor [venlafaxine]; Gluten; Levaquin [levofloxacin]; Lisinopril; Metformin; and Other medication     Tobacco History:  reports that she has never smoked. She has never used smokeless tobacco.  Alcohol Abuse:  reports that she does not drink alcohol. Drug Abuse:  reports that she does not use drugs.     Family Medical/Cancer History:   Family History   Problem Relation Age of Onset   [facility] Cancer Mother         breast age 48    Stroke Mother     Heart Disease Father         CABGx4    High Cholesterol Father     Deep Vein Thrombosis Father         left arm    Diabetes Maternal Grandmother     Heart Disease Paternal Grandfather     High Cholesterol Paternal Grandfather     Heart Attack Paternal Grandfather     Arthritis-rheumatoid Paternal Grandmother     Thyroid Disease Paternal Grandmother     Osteoporosis Maternal Aunt         Review of Systems - History obtained from the patient  Constitutional: negative for weight loss, fever, night sweats  HEENT: negative for hearing loss, earache, congestion, snoring, sorethroat  CV: negative for chest pain, palpitations, edema  Resp: negative for cough, shortness of breath, wheezing  GI: negative for change in bowel habits, abdominal pain, black or bloody stools  : negative for frequency, dysuria, hematuria, vaginal discharge  MSK: negative for back pain, joint pain, muscle pain  Breast: negative for breast lumps, nipple discharge, galactorrhea  Skin :negative for itching, rash, hives  Neuro: negative for dizziness, headache, confusion, weakness  Psych: negative for anxiety, depression, change in mood  Heme/lymph: negative for bleeding, bruising, pallor    Physical Exam    Visit Vitals  /88   Wt 325 lb (147.4 kg)   LMP 08/20/2000   BMI 50.90 kg/m²       Constitutional  · Appearance: well-nourished, well developed, alert, in no acute distress    HENT  · Head and Face: appears normal    Neck  · Inspection/Palpation: normal appearance, no masses or tenderness  · Lymph Nodes: no lymphadenopathy present  · Thyroid: gland size normal, nontender, no nodules or masses present on palpation    Chest  · Respiratory Effort: breathing unlabored  · Auscultation:     Cardiovascular  · Heart:  · Auscultation:     Breasts  · Inspection of Breasts: breasts symmetrical, no skin changes, no discharge present, nipple appearance normal, no skin retraction present  · Palpation of Breasts and Axillae: no masses present on palpation, no breast tenderness  · Axillary Lymph Nodes: no lymphadenopathy present    Gastrointestinal  · Abdominal Examination: abdomen non-tender to palpation, normal bowel sounds, no masses present  · Liver and spleen: no hepatomegaly present, spleen not palpable  · Hernias: no hernias identified    Genitourinary  · External Genitalia: normal appearance for age, no discharge present, no tenderness present, no inflammatory lesions present, no masses present, no atrophy present  · Vagina: normal vaginal vault without central or paravaginal defects, no discharge present, no inflammatory lesions present, small skin tag on anterior vaginal wall near cervix  · Bladder: non-tender to palpation  · Urethra: appears normal  · Cervix: normal   · Uterus: absent  · Adnexa: no adnexal tenderness present, no adnexal masses present  · Perineum: perineum within normal limits, no evidence of trauma, no rashes or skin lesions present  · Anus: anus within normal limits, no hemorrhoids present  · Inguinal Lymph Nodes: no lymphadenopathy present    Skin  · General Inspection: no rash, no lesions identified    Neurologic/Psychiatric  · Mental Status:  · Orientation: grossly oriented to person, place and time  · Mood and Affect: mood normal, affect appropriate    Assessment:  Routine gynecologic examination  Her current medical status is satisfactory with no evidence of significant gynecologic issues.     Plan:  Counseled re: diet, exercise, healthy lifestyle  Return for yearly wellness visits  Rec annual mammogram

## 2019-12-31 ENCOUNTER — OFFICE VISIT (OUTPATIENT)
Dept: INTERNAL MEDICINE CLINIC | Age: 59
End: 2019-12-31

## 2019-12-31 VITALS
WEIGHT: 293 LBS | HEART RATE: 77 BPM | DIASTOLIC BLOOD PRESSURE: 76 MMHG | SYSTOLIC BLOOD PRESSURE: 146 MMHG | HEIGHT: 67 IN | RESPIRATION RATE: 20 BRPM | TEMPERATURE: 97.8 F | OXYGEN SATURATION: 98 % | BODY MASS INDEX: 45.99 KG/M2

## 2019-12-31 DIAGNOSIS — J01.41 ACUTE RECURRENT PANSINUSITIS: Primary | ICD-10-CM

## 2019-12-31 DIAGNOSIS — J45.21 MILD INTERMITTENT REACTIVE AIRWAY DISEASE WITH ACUTE EXACERBATION: ICD-10-CM

## 2019-12-31 DIAGNOSIS — J40 BRONCHITIS: ICD-10-CM

## 2019-12-31 RX ORDER — AMOXICILLIN AND CLAVULANATE POTASSIUM 875; 125 MG/1; MG/1
1 TABLET, FILM COATED ORAL EVERY 12 HOURS
Qty: 14 TAB | Refills: 0 | Status: SHIPPED | OUTPATIENT
Start: 2019-12-31 | End: 2020-01-13 | Stop reason: ALTCHOICE

## 2019-12-31 RX ORDER — PREDNISONE 20 MG/1
TABLET ORAL
Qty: 15 TAB | Refills: 0 | Status: SHIPPED | OUTPATIENT
Start: 2019-12-31 | End: 2020-01-13 | Stop reason: ALTCHOICE

## 2019-12-31 RX ORDER — FLUCONAZOLE 150 MG/1
150 TABLET ORAL DAILY
Qty: 2 TAB | Refills: 0 | Status: SHIPPED | OUTPATIENT
Start: 2019-12-31 | End: 2020-01-01

## 2019-12-31 RX ORDER — CODEINE PHOSPHATE AND GUAIFENESIN 10; 100 MG/5ML; MG/5ML
5 SOLUTION ORAL
Qty: 100 ML | Refills: 0 | Status: SHIPPED | OUTPATIENT
Start: 2019-12-31 | End: 2020-01-13 | Stop reason: SDUPTHER

## 2019-12-31 RX ORDER — LEVALBUTEROL INHALATION SOLUTION 1.25 MG/3ML
1.25 SOLUTION RESPIRATORY (INHALATION)
Qty: 3 ML | Refills: 0
Start: 2019-12-31 | End: 2019-12-31

## 2019-12-31 NOTE — PATIENT INSTRUCTIONS
Bronchitis: Care Instructions  Your Care Instructions    Bronchitis is inflammation of the bronchial tubes, which carry air to the lungs. The tubes swell and produce mucus, or phlegm. The mucus and inflamed bronchial tubes make you cough. You may have trouble breathing. Most cases of bronchitis are caused by viruses like those that cause colds. Antibiotics usually do not help and they may be harmful. Bronchitis usually develops rapidly and lasts about 2 to 3 weeks in otherwise healthy people. Follow-up care is a key part of your treatment and safety. Be sure to make and go to all appointments, and call your doctor if you are having problems. It's also a good idea to know your test results and keep a list of the medicines you take. How can you care for yourself at home? · Take all medicines exactly as prescribed. Call your doctor if you think you are having a problem with your medicine. · Get some extra rest.  · Take an over-the-counter pain medicine, such as acetaminophen (Tylenol), ibuprofen (Advil, Motrin), or naproxen (Aleve) to reduce fever and relieve body aches. Read and follow all instructions on the label. · Do not take two or more pain medicines at the same time unless the doctor told you to. Many pain medicines have acetaminophen, which is Tylenol. Too much acetaminophen (Tylenol) can be harmful. · Take an over-the-counter cough medicine that contains dextromethorphan to help quiet a dry, hacking cough so that you can sleep. Avoid cough medicines that have more than one active ingredient. Read and follow all instructions on the label. · Breathe moist air from a humidifier, hot shower, or sink filled with hot water. The heat and moisture will thin mucus so you can cough it out. · Do not smoke. Smoking can make bronchitis worse. If you need help quitting, talk to your doctor about stop-smoking programs and medicines. These can increase your chances of quitting for good.   When should you call for help? Call 911 anytime you think you may need emergency care. For example, call if:    · You have severe trouble breathing.    Call your doctor now or seek immediate medical care if:    · You have new or worse trouble breathing.     · You cough up dark brown or bloody mucus (sputum).     · You have a new or higher fever.     · You have a new rash.    Watch closely for changes in your health, and be sure to contact your doctor if:    · You cough more deeply or more often, especially if you notice more mucus or a change in the color of your mucus.     · You are not getting better as expected. Where can you learn more? Go to http://lonnie-parrish.info/. Enter H333 in the search box to learn more about \"Bronchitis: Care Instructions. \"  Current as of: June 9, 2019  Content Version: 12.2  © 2678-0012 "[employer]. Care instructions adapted under license by [employer] (which disclaims liability or warranty for this information). If you have questions about a medical condition or this instruction, always ask your healthcare professional. [address] any warranty or liability for your use of this information. Sinusitis: Care Instructions  Your Care Instructions    Sinusitis is an infection of the lining of the sinus cavities in your head. Sinusitis often follows a cold. It causes pain and pressure in your head and face. In most cases, sinusitis gets better on its own in 1 to 2 weeks. But some mild symptoms may last for several weeks. Sometimes antibiotics are needed. Follow-up care is a key part of your treatment and safety. Be sure to make and go to all appointments, and call your doctor if you are having problems. It's also a good idea to know your test results and keep a list of the medicines you take. How can you care for yourself at home?   · Take an over-the-counter pain medicine, such as acetaminophen (Tylenol), ibuprofen (Advil, Motrin), or naproxen (Aleve). Read and follow all instructions on the label. · If the doctor prescribed antibiotics, take them as directed. Do not stop taking them just because you feel better. You need to take the full course of antibiotics. · Be careful when taking over-the-counter cold or flu medicines and Tylenol at the same time. Many of these medicines have acetaminophen, which is Tylenol. Read the labels to make sure that you are not taking more than the recommended dose. Too much acetaminophen (Tylenol) can be harmful. · Breathe warm, moist air from a steamy shower, a hot bath, or a sink filled with hot water. Avoid cold, dry air. Using a humidifier in your home may help. Follow the directions for cleaning the machine. · Use saline (saltwater) nasal washes to help keep your nasal passages open and wash out mucus and bacteria. You can buy saline nose drops at a grocery store or drugstore. Or you can make your own at home by adding 1 teaspoon of salt and 1 teaspoon of baking soda to 2 cups of distilled water. If you make your own, fill a bulb syringe with the solution, insert the tip into your nostril, and squeeze gently. Alease Ruffini your nose. · Put a hot, wet towel or a warm gel pack on your face 3 or 4 times a day for 5 to 10 minutes each time. · Try a decongestant nasal spray like oxymetazoline (Afrin). Do not use it for more than 3 days in a row. Using it for more than 3 days can make your congestion worse. When should you call for help? Call your doctor now or seek immediate medical care if:    · You have new or worse swelling or redness in your face or around your eyes.     · You have a new or higher fever.    Watch closely for changes in your health, and be sure to contact your doctor if:    · You have new or worse facial pain.     · The mucus from your nose becomes thicker (like pus) or has new blood in it.     · You are not getting better as expected. Where can you learn more?   Go to http://lonnie-parrish.info/. Enter H355 in the search box to learn more about \"Sinusitis: Care Instructions. \"  Current as of: October 21, 2018  Content Version: 12.2  © 3420-7875 [employer]. Care instructions adapted under license by [employer] (which disclaims liability or warranty for this information). If you have questions about a medical condition or this instruction, always ask your healthcare professional. [place] [address] any warranty or liability for your use of this information.

## 2019-12-31 NOTE — PROGRESS NOTES
[facility]. Utilizing doxy 100 mg since visit. Still running fever w/no improvement of symptoms.

## 2020-01-01 NOTE — PROGRESS NOTES
HISTORY OF PRESENT ILLNESS  Weston Mathew is a 64 y.o. female. HPI  Presents with complaints of persistent sinus pressure, harsh cough, low grade fever, upper chest tightness, wheezing for the past 8 days. Began with sore throat, head congestion initially; has been having thick green nasal drainage along with productive cough. She was seen at [facility] on 12/27 and given Doxycycline, Medrol dose pack but has not noted any improvement. Has been using her Albuterol inhaler every 4 hours with temporary improvement of wheezing. Continues to run low grade fever on Doxycycline. Patient Active Problem List   Diagnosis Code    HTN (hypertension) I10    Hypothyroidism E03.9    AR (allergic rhinitis) J30.9    Impaired fasting glucose R73.01    Biliary dyskinesia K82.8    Endometriosis N80.9    Hyperlipidemia with target LDL less than 130 E78.5    VSD (ventricular septal defect) Q21.0    Menopausal disorder N95.9    Depression F32.9    Gluten intolerance K90.41    Vitamin D deficiency E55.9    MR (mitral regurgitation) I34.0    Sebaceous cyst of skin of breast N60.89    Sebaceous cyst of breast N60.89    Skin abnormalities L98.9    Environmental allergies Z91.09    Obesity, morbid ([address] E66.01    Stress incontinence of urine N39.3    FRANSISCA (obstructive sleep apnea) G47.33     Past Surgical History:   Procedure Laterality Date    COLONOSCOPY  9/2009    Dr. Tip Bui  12/30/2010    left, had lipoma removed.   Dr. Ambrose Rao    HX COLONOSCOPY  07/29/2015    normal.  Dr. Lopez Shed    [address]    HX CYST REMOVAL  06/2017    Dr. Cheyanne Soliz HX ENDOSCOPY  07/29/2015    gastritis    HX HEART CATHETERIZATION      age 5, age 11 due to VSD    HX HYSTERECTOMY      supracervical    HX LAP CHOLECYSTECTOMY  8/01/2014    Dr. Leonela Medina/Kun and adhesions    HX SALPINGO-OOPHORECTOMY      HX TONSILLECTOMY       Social History     Socioeconomic History    Marital status: SINGLE     Spouse name: Not on file    Number of children: 0    Years of education: Not on file    Highest education level: Not on file   Occupational History    Not on file   Social Needs    Financial resource strain: Not on file    Food insecurity:     Worry: Not on file     Inability: Not on file    Transportation needs:     Medical: Not on file     Non-medical: Not on file   Tobacco Use    Smoking status: Never Smoker    Smokeless tobacco: Never Used   Substance and Sexual Activity    Alcohol use: No    Drug use: No    Sexual activity: Not Currently   Lifestyle    Physical activity:     Days per week: Not on file     Minutes per session: Not on file    Stress: Not on file   Relationships    Social connections:     Talks on phone: Not on file     Gets together: Not on file     Attends Church service: Not on file     Active member of club or organization: Not on file     Attends meetings of clubs or organizations: Not on file     Relationship status: Not on file    Intimate partner violence:     Fear of current or ex partner: Not on file     Emotionally abused: Not on file     Physically abused: Not on file     Forced sexual activity: Not on file   Other Topics Concern    Not on file   Social History Narrative    Not on file     Family History   Problem Relation Age of Onset    Cancer Mother         breast age 48    Stroke Mother     Heart Disease Father         CABGx4    High Cholesterol Father     Deep Vein Thrombosis Father         left arm    Diabetes Maternal Grandmother     Heart Disease Paternal Grandfather     High Cholesterol Paternal Grandfather     Heart Attack Paternal Grandfather     Arthritis-rheumatoid Paternal Grandmother     Thyroid Disease Paternal Grandmother     Osteoporosis Maternal Aunt      Current Outpatient Medications   Medication Sig    amoxicillin-clavulanate (AUGMENTIN) 875-125 mg per tablet Take 1 Tab by mouth every twelve (12) hours.     predniSONE (DELTASONE) 20 mg tablet Take 3 tabs daily x 2 days then 2 tabs daily x 3 days then 1 tab daily x 2 days then 1/2 tab daily x 2 days    guaiFENesin-codeine (ROBITUSSIN AC) 100-10 mg/5 mL solution Take 5 mL by mouth three (3) times daily as needed for Cough for up to 7 days. Max Daily Amount: 15 mL.  fluconazole (DIFLUCAN) 150 mg tablet Take 1 Tab by mouth daily for 1 day. May repeat in 4 days with 2nd tab if needed    estradiol (ESTRACE) 1 mg tablet TAKE 1 TABLET BY MOUTH EVERY DAY    olmesartan (BENICAR) 40 mg tablet olmesartan 40 mg tablet    levothyroxine (SYNTHROID) 25 mcg tablet Synthroid 25 mcg tablet    hydroCHLOROthiazide (HYDRODIURIL) 25 mg tablet TAKE 1 TABLET BY MOUTH EVERY DAY    magnesium oxide (MAG-OX) 400 mg tablet Take 400 mg by mouth daily.  ferrous sulfate (IRON) 325 mg (65 mg iron) tablet Take  by mouth Daily (before breakfast).  SYNTHROID 150 mcg tablet Take 162 mcg by mouth Daily (before breakfast).  ZINC PO Take 1 Tab by mouth daily.  ASCORBATE CALCIUM (VITAMIN C PO) Take 1 Tab by mouth daily.  cholecalciferol, vitamin D3, (VITAMIN D3) 4,000 unit cap Take 1 Tab by mouth daily.  VITAMIN B COMPLEX PO Take 1 Tab by mouth daily. No current facility-administered medications for this visit.       Allergies   Allergen Reactions    Effexor [Venlafaxine] Other (comments)     Htn,nose bleeding,increase in anxiety    Gluten Other (comments)     Irritable bowel symptoms, bloating     Levaquin [Levofloxacin] Myalgia     Per pt muscle weakness    Lisinopril Diarrhea    Metformin Diarrhea    Other Medication Swelling     Family of grains     Immunization History   Administered Date(s) Administered    Influenza Vaccine 08/24/2016, 08/01/2017, 10/18/2018    Influenza Vaccine Whole 09/21/2010    Pneumococcal Polysaccharide (PPSV-23) 11/01/2018    TD Vaccine 03/05/2006    TDAP Vaccine 08/31/2011         Review of Systems   Constitutional: Positive for fever and malaise/fatigue. Negative for chills. HENT: Positive for congestion, sinus pain and sore throat. Respiratory: Positive for cough, sputum production, shortness of breath and wheezing. Cardiovascular: Negative for chest pain. Gastrointestinal: Negative for nausea and vomiting. Musculoskeletal: Negative for myalgias. Skin: Negative for rash. Neurological: Positive for headaches. Negative for dizziness and tingling. /76 (BP 1 Location: Left arm, BP Patient Position: Sitting)   Pulse 77   Temp 97.8 °F (36.6 °C) (Oral)   Resp 20   Ht 5' 7\" (1.702 m)   Wt (!) 355 lb (161 kg)   LMP 08/20/2000   SpO2 98%   BMI 55.60 kg/m²   Physical Exam  Vitals signs and nursing note reviewed. Constitutional:       Appearance: Normal appearance. Comments: Appears tired   HENT:      Head: Normocephalic and atraumatic. Right Ear: Tympanic membrane and external ear normal.      Left Ear: Tympanic membrane and external ear normal.      Nose: Congestion present. Right Turbinates: Swollen. Left Turbinates: Swollen. Right Sinus: Maxillary sinus tenderness present. Left Sinus: Maxillary sinus tenderness present. Mouth/Throat:      Mouth: Mucous membranes are moist.      Pharynx: Posterior oropharyngeal erythema present. Neck:      Musculoskeletal: Normal range of motion and neck supple. Cardiovascular:      Rate and Rhythm: Normal rate and regular rhythm. Pulses: Normal pulses. Heart sounds: Normal heart sounds. Pulmonary:      Effort: Pulmonary effort is normal.      Breath sounds: Wheezing present. Comments: Upper chest congestion with harsh cough  Skin:     General: Skin is warm and dry. Neurological:      Mental Status: She is alert. Psychiatric:         Mood and Affect: Mood normal.         Behavior: Behavior normal.         ASSESSMENT and PLAN  Diagnoses and all orders for this visit:    1.  Acute recurrent pansinusitis -- has failed to improve with Doxycycline. Will discontinue and start Augmentin  -     amoxicillin-clavulanate (AUGMENTIN) 875-125 mg per tablet; Take 1 Tab by mouth every twelve (12) hours. -     fluconazole (DIFLUCAN) 150 mg tablet; Take 1 Tab by mouth daily for 1 day. May repeat in 4 days with 2nd tab if needed    2. Bronchitis  -     amoxicillin-clavulanate (AUGMENTIN) 875-125 mg per tablet; Take 1 Tab by mouth every twelve (12) hours. -     guaiFENesin-codeine (ROBITUSSIN AC) 100-10 mg/5 mL solution; Take 5 mL by mouth three (3) times daily as needed for Cough for up to 7 days. Max Daily Amount: 15 mL. -     fluconazole (DIFLUCAN) 150 mg tablet; Take 1 Tab by mouth daily for 1 day. May repeat in 4 days with 2nd tab if needed    3. Mild intermittent reactive airway disease with acute exacerbation -- Xopenex nebulizer treatment given in office with improvement of wheezing. Will restart steroid taper as she is completing Medrol dose pack without improvement. -     predniSONE (DELTASONE) 20 mg tablet; Take 3 tabs daily x 2 days then 2 tabs daily x 3 days then 1 tab daily x 2 days then 1/2 tab daily x 2 days  -     LEVALBUTEROL, INHAL. SOL., FDA-APPROVED FINAL, NON-COMPOUND UNIT DOSE, 0.5 MG  -     levalbuterol (XOPENEX) 1.25 mg/3 mL nebu; 3 mL by Nebulization route now for 1 dose.   -     DE PRESSURIZED/NONPRESSURIZED INHALATION TREATMENT      lab results and schedule of future lab studies reviewed with patient  reviewed diet, exercise and weight control  reviewed medications and side effects in detail

## 2020-01-13 ENCOUNTER — HOSPITAL ENCOUNTER (OUTPATIENT)
Dept: GENERAL RADIOLOGY | Age: 60
Discharge: HOME | End: 2020-01-13
Attending: NURSE PRACTITIONER
Payer: COMMERCIAL

## 2020-01-13 ENCOUNTER — OFFICE VISIT (OUTPATIENT)
Dept: INTERNAL MEDICINE CLINIC | Age: 60
End: 2020-01-13

## 2020-01-13 ENCOUNTER — TELEPHONE (OUTPATIENT)
Dept: INTERNAL MEDICINE CLINIC | Age: 60
End: 2020-01-13

## 2020-01-13 VITALS
HEIGHT: 67 IN | WEIGHT: 293 LBS | RESPIRATION RATE: 14 BRPM | TEMPERATURE: 99.1 F | BODY MASS INDEX: 45.99 KG/M2 | OXYGEN SATURATION: 97 % | DIASTOLIC BLOOD PRESSURE: 76 MMHG | HEART RATE: 102 BPM | SYSTOLIC BLOOD PRESSURE: 114 MMHG

## 2020-01-13 DIAGNOSIS — R68.89 FLU-LIKE SYMPTOMS: Primary | ICD-10-CM

## 2020-01-13 DIAGNOSIS — J98.01 BRONCHOSPASM: ICD-10-CM

## 2020-01-13 DIAGNOSIS — R05.9 COUGH: ICD-10-CM

## 2020-01-13 DIAGNOSIS — J40 BRONCHITIS: ICD-10-CM

## 2020-01-13 LAB
FLUAV+FLUBV AG NOSE QL IA.RAPID: NEGATIVE POS/NEG
FLUAV+FLUBV AG NOSE QL IA.RAPID: NEGATIVE POS/NEG
VALID INTERNAL CONTROL?: YES

## 2020-01-13 PROCEDURE — 71046 X-RAY EXAM CHEST 2 VIEWS: CPT

## 2020-01-13 RX ORDER — CEFDINIR 300 MG/1
300 CAPSULE ORAL 2 TIMES DAILY
Qty: 20 CAP | Refills: 0 | Status: SHIPPED | OUTPATIENT
Start: 2020-01-13 | End: 2020-01-21 | Stop reason: ALTCHOICE

## 2020-01-13 RX ORDER — BUDESONIDE AND FORMOTEROL FUMARATE DIHYDRATE 160; 4.5 UG/1; UG/1
2 AEROSOL RESPIRATORY (INHALATION) 2 TIMES DAILY
Qty: 1 INHALER | Refills: 1 | Status: SHIPPED | OUTPATIENT
Start: 2020-01-13 | End: 2021-08-12 | Stop reason: ALTCHOICE

## 2020-01-13 RX ORDER — CODEINE PHOSPHATE AND GUAIFENESIN 10; 100 MG/5ML; MG/5ML
5 SOLUTION ORAL
Qty: 100 ML | Refills: 0 | Status: SHIPPED | OUTPATIENT
Start: 2020-01-13 | End: 2020-01-20

## 2020-01-13 NOTE — LETTER
NOTIFICATION RETURN TO WORK / SCHOOL 
 
1/13/2020 4:30 PM 
 
Ms. Lucein Gunter [place] [phone] To Whom It May Concern: 
 
Lucien Gunter is currently under the care of [address] [place] [address],[address]. She was seen in office today for medical illness; she should be allowed to work from home for the next 3 days. She will return to work/school on: 1/17/2020 If there are questions or concerns please have the patient contact our office.  
 
 
 
Sincerely, 
 
 
Vineet Fermin NP

## 2020-01-13 NOTE — TELEPHONE ENCOUNTER
----- Message from Nas Santo sent at 1/13/2020 12:48 PM EST -----  Regarding: ESTUARDO Rendon/ Telephone  Contact: [phone]  Caller's first and last name and relationship (if not the patient): n/a   Best contact number(s): [phone]  Whose call is being returned: Pt is returning call back to ESTUARDO Hannah    Details to clarify the request: n/a

## 2020-01-14 NOTE — PROGRESS NOTES
HISTORY OF PRESENT ILLNESS  Steffanie Calvillo is a 64 y.o. female. HPI   Presents with return of loose cough productive of thick yellow mucous since yesterday. She was seen in office on 12/31 for sinusitis and bronchitis. She was treated with Augmentin and Prednisone taper and noted considerable improvement after several days of treatment. Reports she felt like symptoms had completely resolved but then yesterday, began to have more chest congestion and cough has become productive of thick yellow mucous. Has been feeling more tight in chest and has been using her Albuterol inhaler since yesterday. Has felt slightly feverish today. Has been taking some Mucinex DM for current symptoms. Review of Systems   Constitutional: Positive for fever and malaise/fatigue. Negative for chills. HENT: Negative for congestion, sinus pain and sore throat. Respiratory: Positive for cough, sputum production and wheezing. Cardiovascular: Negative for chest pain and palpitations. Gastrointestinal: Negative for nausea and vomiting. Musculoskeletal: Positive for myalgias. Neurological: Positive for headaches. /76 (BP 1 Location: Left arm, BP Patient Position: Sitting)   Pulse (!) 102   Temp 99.1 °F (37.3 °C) (Oral)   Resp 14   Ht 5' 7\" (1.702 m)   Wt 323 lb 9.6 oz (146.8 kg)   LMP 08/20/2000   SpO2 97%   BMI 50.68 kg/m²   Physical Exam  Vitals signs and nursing note reviewed. Constitutional:       Appearance: Normal appearance. HENT:      Head: Normocephalic and atraumatic. Right Ear: Tympanic membrane and external ear normal.      Left Ear: Tympanic membrane and external ear normal.      Nose: Nose normal.      Mouth/Throat:      Mouth: Mucous membranes are moist.      Pharynx: Oropharynx is clear. Neck:      Musculoskeletal: Normal range of motion and neck supple. Cardiovascular:      Pulses: Normal pulses. Heart sounds: Normal heart sounds.    Pulmonary:      Effort: Pulmonary effort is normal.      Breath sounds: Wheezing present. Comments: Upper chest congestion with scattered wheezing. Musculoskeletal: Normal range of motion. Skin:     General: Skin is warm and dry. Neurological:      Mental Status: She is alert. Psychiatric:         Mood and Affect: Mood normal.         Behavior: Behavior normal.         ASSESSMENT and PLAN  Diagnoses and all orders for this visit:    1. Flu-like symptoms  -     AMB POC BOBBY INFLUENZA A/B TEST - negative    2. Cough  -     XR CHEST PA LAT; Future  -     budesonide-formoterol (SYMBICORT) 160-4.5 mcg/actuation HFAA; Take 2 Puffs by inhalation two (2) times a day. 3. Bronchitis  -     guaiFENesin-codeine (ROBITUSSIN AC) 100-10 mg/5 mL solution; Take 5 mL by mouth three (3) times daily as needed for Cough for up to 7 days. Max Daily Amount: 15 mL. -     cefdinir (OMNICEF) 300 mg capsule; Take 1 Cap by mouth two (2) times a day. 4. Bronchospasm  -     budesonide-formoterol (SYMBICORT) 160-4.5 mcg/actuation HFAA; Take 2 Puffs by inhalation two (2) times a day.       lab results and schedule of future lab studies reviewed with patient  reviewed diet, exercise and weight control  reviewed medications and side effects in detail  radiology results and schedule of future radiology studies reviewed with patient

## 2020-01-17 ENCOUNTER — TELEPHONE (OUTPATIENT)
Dept: INTERNAL MEDICINE CLINIC | Age: 60
End: 2020-01-17

## 2020-01-17 DIAGNOSIS — J45.21 MILD INTERMITTENT REACTIVE AIRWAY DISEASE WITH ACUTE EXACERBATION: ICD-10-CM

## 2020-01-17 LAB — HBA1C MFR BLD HPLC: 5.9 %

## 2020-01-17 RX ORDER — DOXYCYCLINE 100 MG/1
100 CAPSULE ORAL 2 TIMES DAILY
Qty: 20 CAP | Refills: 0 | Status: SHIPPED | OUTPATIENT
Start: 2020-01-17 | End: 2020-03-10 | Stop reason: ALTCHOICE

## 2020-01-17 RX ORDER — PREDNISONE 20 MG/1
TABLET ORAL
Qty: 18 TAB | Refills: 0 | Status: SHIPPED | OUTPATIENT
Start: 2020-01-17 | End: 2020-03-10 | Stop reason: ALTCHOICE

## 2020-01-17 NOTE — TELEPHONE ENCOUNTER
I called pt to advise that NP sent in abx and steroid, pt needs to follow up next week Tuesday or Wednesday. If sx get worse over the weekend, needs to be seen in the ER/Urgent Care. Pt verbalized understanding and was thankful.

## 2020-01-17 NOTE — TELEPHONE ENCOUNTER
Patient called to report that she is still running a fever & not feeling any better since Monday's office visit.      PSR scheduled patient with NP at 11:40 this am.     Please call patient to advise if NP wants to see her today or switch medication patient is currently on.     [phone]

## 2020-01-21 ENCOUNTER — OFFICE VISIT (OUTPATIENT)
Dept: INTERNAL MEDICINE CLINIC | Age: 60
End: 2020-01-21

## 2020-01-21 VITALS
HEIGHT: 67 IN | DIASTOLIC BLOOD PRESSURE: 82 MMHG | RESPIRATION RATE: 14 BRPM | SYSTOLIC BLOOD PRESSURE: 140 MMHG | TEMPERATURE: 98.7 F | WEIGHT: 293 LBS | BODY MASS INDEX: 45.99 KG/M2 | HEART RATE: 77 BPM | OXYGEN SATURATION: 97 %

## 2020-01-21 DIAGNOSIS — J98.01 BRONCHOSPASM: ICD-10-CM

## 2020-01-21 DIAGNOSIS — J40 BRONCHITIS: Primary | ICD-10-CM

## 2020-01-21 RX ORDER — ALBUTEROL SULFATE 0.83 MG/ML
2.5 SOLUTION RESPIRATORY (INHALATION)
Qty: 30 NEBULE | Refills: 2 | Status: SHIPPED | OUTPATIENT
Start: 2020-01-21 | End: 2022-05-09

## 2020-01-21 RX ORDER — NEBULIZER AND COMPRESSOR
EACH MISCELLANEOUS
Qty: 1 EACH | Refills: 0 | Status: SHIPPED | OUTPATIENT
Start: 2020-01-21 | End: 2020-05-04 | Stop reason: ALTCHOICE

## 2020-01-21 RX ORDER — LEVALBUTEROL INHALATION SOLUTION 1.25 MG/3ML
1.25 SOLUTION RESPIRATORY (INHALATION)
Qty: 3 ML | Refills: 0
Start: 2020-01-21 | End: 2020-01-21

## 2020-01-21 RX ORDER — CLARITHROMYCIN 500 MG/1
500 TABLET, FILM COATED ORAL 2 TIMES DAILY
Qty: 20 TAB | Refills: 0 | Status: SHIPPED | OUTPATIENT
Start: 2020-01-21 | End: 2020-03-10 | Stop reason: ALTCHOICE

## 2020-01-21 NOTE — LETTER
NOTIFICATION RETURN TO WORK / SCHOOL 
 
1/21/2020 4:38 PM 
 
Ms. Elvia Fajardo [phone] To Whom It May Concern: 
 
Elvia Fajardo is currently under the care of [address] [place] [address],[address]. She was seen again in the office and has been advised to remain out of work for this week. She will return to work/school on: 1/27/2020. If there are questions or concerns please have the patient contact our office.  
 
 
 
Sincerely, 
 
 
Lisa Suarez NP

## 2020-01-21 NOTE — PROGRESS NOTES
HISTORY OF PRESENT ILLNESS  Anuel Perkins is a 64 y.o. female. HPI   Presents for follow-up bronchitis from 1/13/2020 office visit where she was started on Omnicef and steroid taper for continued bronchial congestion and wheezing; contacted office 4 days later with continued fever and worsening of symptoms at which time Katheen Pean was discontinued and she was restarted on course of doxycycline. Has completed half of the course of doxycycline but continues to feel very congested. Continues to use albuterol inhaler every 4 hours. Able to speak in full sentences but has been coughing frequently and feels very tired. Reports she is no longer running fever. Has been out of work for the past week but has been working from home. Reports many coworkers are ill with similar symptoms. Has history of bronchitis in past but does not typically have prolonged wheezing and bronchospasm. She has been using Symbicort inhaler for the past week as well as the albuterol inhaler.     Patient Active Problem List   Diagnosis Code    HTN (hypertension) I10    Hypothyroidism E03.9    AR (allergic rhinitis) J30.9    Impaired fasting glucose R73.01    Biliary dyskinesia K82.8    Endometriosis N80.9    Hyperlipidemia with target LDL less than 130 E78.5    VSD (ventricular septal defect) Q21.0    Menopausal disorder N95.9    Depression F32.9    Gluten intolerance K90.41    Vitamin D deficiency E55.9    MR (mitral regurgitation) I34.0    Sebaceous cyst of skin of breast N60.89    Sebaceous cyst of breast N60.89    Skin abnormalities L98.9    Environmental allergies Z91.09    Obesity, morbid ([address] E66.01    Stress incontinence of urine N39.3    FRANSISCA (obstructive sleep apnea) G47.33     Past Surgical History:   Procedure Laterality Date    COLONOSCOPY  9/2009    Dr. Elvia Looney    HX COLONOSCOPY  07/29/2015    normal.  Dr. Ariadna Dick  06/2017    Dr. Shoa Gaona HX ENDOSCOPY  07/29/2015 gastritis    HX HEART CATHETERIZATION      age 5, age 11 due to VSD    HX HYSTERECTOMY      supracervical    HX LAP CHOLECYSTECTOMY  8/01/2014    Dr. Shiv Medina/Kun and adhesions    HX SALPINGO-OOPHORECTOMY      HX TONSILLECTOMY      [place] FEMUR/KNEE SURG UNLISTED  12/30/2010    left, had lipoma removed.   Dr. Praveen Velásquez History     Socioeconomic History    Marital status: SINGLE     Spouse name: Not on file    Number of children: 0    Years of education: Not on file    Highest education level: Not on file   Occupational History    Not on file   Social Needs    Financial resource strain: Not on file    Food insecurity:     Worry: Not on file     Inability: Not on file    Transportation needs:     Medical: Not on file     Non-medical: Not on file   Tobacco Use    Smoking status: Never Smoker    Smokeless tobacco: Never Used   Substance and Sexual Activity    Alcohol use: No    Drug use: No    Sexual activity: Not Currently   Lifestyle    Physical activity:     Days per week: Not on file     Minutes per session: Not on file    Stress: Not on file   Relationships    Social connections:     Talks on phone: Not on file     Gets together: Not on file     Attends Rastafarian service: Not on file     Active member of club or organization: Not on file     Attends meetings of clubs or organizations: Not on file     Relationship status: Not on file    Intimate partner violence:     Fear of current or ex partner: Not on file     Emotionally abused: Not on file     Physically abused: Not on file     Forced sexual activity: Not on file   Other Topics Concern    Not on file   Social History Narrative    Not on file     Family History   Problem Relation Age of Onset    Cancer Mother         breast age 48    Stroke Mother     Heart Disease Father         CABGx4    High Cholesterol Father     Deep Vein Thrombosis Father         left arm    Diabetes Maternal Grandmother     Heart Disease Paternal Grandfather     High Cholesterol Paternal Grandfather     Heart Attack Paternal Grandfather     Arthritis-rheumatoid Paternal Grandmother     Thyroid Disease Paternal Grandmother     Osteoporosis Maternal Aunt      Current Outpatient Medications   Medication Sig    clarithromycin (BIAXIN) 500 mg tablet Take 1 Tab by mouth two (2) times a day.  levalbuterol (XOPENEX) 1.25 mg/3 mL nebu 3 mL by Nebulization route now for 1 dose.  Nebulizer & Compressor machine Please dispense with accessory kit    Nebulizer Accessories kit Please dispense with nebulizer & compressor    albuterol (PROVENTIL VENTOLIN) 2.5 mg /3 mL (0.083 %) nebu 3 mL by Nebulization route every four (4) hours as needed for Wheezing.  predniSONE (DELTASONE) 20 mg tablet Take 3 tabs daily x 3 days then 2 tabs daily x 3 days then 1 tab daily x 2 days then 1/2 tab daily x 2 days    doxycycline (VIBRAMYCIN) 100 mg capsule Take 1 Cap by mouth two (2) times a day.  budesonide-formoterol (SYMBICORT) 160-4.5 mcg/actuation HFAA Take 2 Puffs by inhalation two (2) times a day.  estradiol (ESTRACE) 1 mg tablet TAKE 1 TABLET BY MOUTH EVERY DAY    olmesartan (BENICAR) 40 mg tablet olmesartan 40 mg tablet    levothyroxine (SYNTHROID) 25 mcg tablet Take 12.5 mcg by mouth. 6 days a week    hydroCHLOROthiazide (HYDRODIURIL) 25 mg tablet TAKE 1 TABLET BY MOUTH EVERY DAY    magnesium oxide (MAG-OX) 400 mg tablet Take 400 mg by mouth daily.  ferrous sulfate (IRON) 325 mg (65 mg iron) tablet Take  by mouth Daily (before breakfast).  SYNTHROID 150 mcg tablet Take 150 mcg by mouth Daily (before breakfast). Daily    ZINC PO Take 1 Tab by mouth daily.  ASCORBATE CALCIUM (VITAMIN C PO) Take 1 Tab by mouth daily.  cholecalciferol, vitamin D3, (VITAMIN D3) 4,000 unit cap Take 1 Tab by mouth daily.  VITAMIN B COMPLEX PO Take 1 Tab by mouth daily. No current facility-administered medications for this visit.       Allergies   Allergen Reactions    Effexor [Venlafaxine] Other (comments)     Htn,nose bleeding,increase in anxiety    Gluten Other (comments)     Irritable bowel symptoms, bloating     Levaquin [Levofloxacin] Myalgia     Per pt muscle weakness    Lisinopril Diarrhea    Metformin Diarrhea    Other Medication Swelling     Family of grains     Immunization History   Administered Date(s) Administered    Influenza Vaccine 08/24/2016, 08/01/2017, 10/18/2018    Influenza Vaccine Whole 09/21/2010    Pneumococcal Polysaccharide (PPSV-23) 11/01/2018    TD Vaccine 03/05/2006    TDAP Vaccine 08/31/2011         Review of Systems   Constitutional: Positive for malaise/fatigue. Negative for chills and fever. HENT: Negative for congestion and sore throat. Respiratory: Positive for cough, sputum production, shortness of breath and wheezing. Cardiovascular: Negative for chest pain. Gastrointestinal: Negative for nausea and vomiting. Musculoskeletal: Negative for myalgias. Skin: Negative for rash. Neurological: Positive for headaches. Negative for dizziness. /82 (BP 1 Location: Left arm, BP Patient Position: Sitting)   Pulse 77   Temp 98.7 °F (37.1 °C) (Oral)   Resp 14   Ht 5' 7\" (1.702 m)   Wt 322 lb 9.6 oz (146.3 kg)   LMP 08/20/2000   SpO2 97%   BMI 50.53 kg/m²   Physical Exam  Vitals signs and nursing note reviewed. Constitutional:       Appearance: Normal appearance. She is obese. Comments: Appears tired   HENT:      Head: Normocephalic and atraumatic. Right Ear: Tympanic membrane and external ear normal.      Left Ear: Tympanic membrane and external ear normal.      Nose: Congestion present. Mouth/Throat:      Mouth: Mucous membranes are moist.      Pharynx: Oropharynx is clear. Neck:      Musculoskeletal: Normal range of motion and neck supple. Cardiovascular:      Rate and Rhythm: Normal rate and regular rhythm.    Pulmonary:      Effort: Pulmonary effort is normal.      Breath sounds: Wheezing and rhonchi present. Comments: Scattered rhonchi and wheezes throughout  Musculoskeletal: Normal range of motion. Skin:     General: Skin is warm and dry. Neurological:      General: No focal deficit present. Mental Status: She is alert and oriented to person, place, and time. ASSESSMENT and PLAN  Diagnoses and all orders for this visit:    1. Bronchitis --her symptoms do not appear to be improved with doxycycline; will discontinue this and start on course of Biaxin which has worked well for her in the past.  -     clarithromycin (BIAXIN) 500 mg tablet; Take 1 Tab by mouth two (2) times a day. 2. Bronchospasm --Xopenex nebulizer treatment given in office with some improvement of wheezing; will have her obtain home nebulizer machine and do nebulized treatments every 4 hours as needed; strongly urged to seek immediate medical attention if shortness of breath or wheezing worsens. If fails to break, may consider pulmonary consult  -     LEVALBUTEROL, INHAL. SOL., FDA-APPROVED FINAL, NON-COMPOUND UNIT DOSE, 0.5 MG  -     levalbuterol (XOPENEX) 1.25 mg/3 mL nebu; 3 mL by Nebulization route now for 1 dose. -     NE PRESSURIZED/NONPRESSURIZED INHALATION TREATMENT  -     Nebulizer & Compressor machine; Please dispense with accessory kit  -     Nebulizer Accessories kit; Please dispense with nebulizer & compressor  -     albuterol (PROVENTIL VENTOLIN) 2.5 mg /3 mL (0.083 %) nebu; 3 mL by Nebulization route every four (4) hours as needed for Wheezing.       lab results and schedule of future lab studies reviewed with patient  reviewed diet, exercise and weight control  reviewed medications and side effects in detail  radiology results and schedule of future radiology studies reviewed with patient

## 2020-01-21 NOTE — PATIENT INSTRUCTIONS
Bronchitis: Care Instructions  Your Care Instructions    Bronchitis is inflammation of the bronchial tubes, which carry air to the lungs. The tubes swell and produce mucus, or phlegm. The mucus and inflamed bronchial tubes make you cough. You may have trouble breathing. Most cases of bronchitis are caused by viruses like those that cause colds. Antibiotics usually do not help and they may be harmful. Bronchitis usually develops rapidly and lasts about 2 to 3 weeks in otherwise healthy people. Follow-up care is a key part of your treatment and safety. Be sure to make and go to all appointments, and call your doctor if you are having problems. It's also a good idea to know your test results and keep a list of the medicines you take. How can you care for yourself at home? · Take all medicines exactly as prescribed. Call your doctor if you think you are having a problem with your medicine. · Get some extra rest.  · Take an over-the-counter pain medicine, such as acetaminophen (Tylenol), ibuprofen (Advil, Motrin), or naproxen (Aleve) to reduce fever and relieve body aches. Read and follow all instructions on the label. · Do not take two or more pain medicines at the same time unless the doctor told you to. Many pain medicines have acetaminophen, which is Tylenol. Too much acetaminophen (Tylenol) can be harmful. · Take an over-the-counter cough medicine that contains dextromethorphan to help quiet a dry, hacking cough so that you can sleep. Avoid cough medicines that have more than one active ingredient. Read and follow all instructions on the label. · Breathe moist air from a humidifier, hot shower, or sink filled with hot water. The heat and moisture will thin mucus so you can cough it out. · Do not smoke. Smoking can make bronchitis worse. If you need help quitting, talk to your doctor about stop-smoking programs and medicines. These can increase your chances of quitting for good.   When should you call for help? Call 911 anytime you think you may need emergency care. For example, call if:    · You have severe trouble breathing.    Call your doctor now or seek immediate medical care if:    · You have new or worse trouble breathing.     · You cough up dark brown or bloody mucus (sputum).     · You have a new or higher fever.     · You have a new rash.    Watch closely for changes in your health, and be sure to contact your doctor if:    · You cough more deeply or more often, especially if you notice more mucus or a change in the color of your mucus.     · You are not getting better as expected. Where can you learn more? Go to http://lonnie-parrish.info/. Enter H333 in the search box to learn more about \"Bronchitis: Care Instructions. \"  Current as of: June 9, 2019  Content Version: 12.2  © 8894-9273 [employer]. Care instructions adapted under license by [employer] (which disclaims liability or warranty for this information). If you have questions about a medical condition or this instruction, always ask your healthcare professional. [place] [address] any warranty or liability for your use of this information. Reactive Airway Disease: Care Instructions  Your Care Instructions    Reactive airway disease is a breathing problem that appears as wheezing, a whistling noise in your airways. It may be caused by a viral or bacterial infection, allergies, tobacco smoke, or something else in the environment. When you are around these triggers, your body releases chemicals that make the airways get tight. Reactive airway disease is a lot like asthma. Both can cause wheezing. But asthma is ongoing, while reactive airway disease may occur only now and then. Tests can be done to tell whether you have asthma. You may take the same medicines used to treat asthma. Good home care and follow-up care with your doctor can help you recover.   Follow-up care is a key part of your treatment and safety. Be sure to make and go to all appointments, and call your doctor if you are having problems. It's also a good idea to know your test results and keep a list of the medicines you take. How can you care for yourself at home? · Take your medicines exactly as prescribed. Call your doctor if you think you are having a problem with your medicine. · Do not smoke or allow others to smoke around you. If you need help quitting, talk to your doctor about stop-smoking programs and medicines. These can increase your chances of quitting for good. · If you know what caused your wheezing (such as perfume or the odor of household chemicals), try to avoid it in the future. · Wash your hands several times a day, and consider using hand gels or wipes that contain alcohol. This can prevent colds and other infections. When should you call for help? Call 911 anytime you think you may need emergency care. For example, call if:    · You have severe trouble breathing.    Watch closely for changes in your health, and be sure to contact your doctor if:    · You cough up yellow, dark brown, or bloody mucus.     · You have a fever.     · Your wheezing gets worse. Where can you learn more? Go to http://lonnie-parrish.info/. Enter H310 in the search box to learn more about \"Reactive Airway Disease: Care Instructions. \"  Current as of: June 9, 2019  Content Version: 12.2  © 6777-8165 [employer]. Care instructions adapted under license by [employer] (which disclaims liability or warranty for this information). If you have questions about a medical condition or this instruction, always ask your healthcare professional. [address] any warranty or liability for your use of this information.

## 2020-02-12 DIAGNOSIS — G89.4 CHRONIC PAIN SYNDROME: ICD-10-CM

## 2020-02-12 RX ORDER — TRAMADOL HYDROCHLORIDE 50 MG/1
50 TABLET ORAL
Qty: 15 TAB | Refills: 0 | Status: SHIPPED | OUTPATIENT
Start: 2020-02-12 | End: 2020-02-17

## 2020-03-10 ENCOUNTER — OFFICE VISIT (OUTPATIENT)
Dept: INTERNAL MEDICINE CLINIC | Age: 60
End: 2020-03-10

## 2020-03-10 VITALS
DIASTOLIC BLOOD PRESSURE: 82 MMHG | SYSTOLIC BLOOD PRESSURE: 137 MMHG | OXYGEN SATURATION: 97 % | TEMPERATURE: 98.7 F | HEIGHT: 67 IN | BODY MASS INDEX: 45.99 KG/M2 | HEART RATE: 85 BPM | WEIGHT: 293 LBS | RESPIRATION RATE: 12 BRPM

## 2020-03-10 DIAGNOSIS — J45.21 MILD INTERMITTENT REACTIVE AIRWAY DISEASE WITH ACUTE EXACERBATION: ICD-10-CM

## 2020-03-10 DIAGNOSIS — J40 BRONCHITIS: Primary | ICD-10-CM

## 2020-03-10 RX ORDER — CODEINE PHOSPHATE AND GUAIFENESIN 10; 100 MG/5ML; MG/5ML
5 SOLUTION ORAL
Qty: 100 ML | Refills: 0 | Status: SHIPPED | OUTPATIENT
Start: 2020-03-10 | End: 2020-03-17

## 2020-03-10 RX ORDER — CLARITHROMYCIN 500 MG/1
500 TABLET, FILM COATED ORAL 2 TIMES DAILY
Qty: 20 TAB | Refills: 0 | Status: SHIPPED | OUTPATIENT
Start: 2020-03-10 | End: 2020-05-04 | Stop reason: ALTCHOICE

## 2020-03-10 NOTE — PATIENT INSTRUCTIONS
Bronchitis: Care Instructions  Your Care Instructions    Bronchitis is inflammation of the bronchial tubes, which carry air to the lungs. The tubes swell and produce mucus, or phlegm. The mucus and inflamed bronchial tubes make you cough. You may have trouble breathing. Most cases of bronchitis are caused by viruses like those that cause colds. Antibiotics usually do not help and they may be harmful. Bronchitis usually develops rapidly and lasts about 2 to 3 weeks in otherwise healthy people. Follow-up care is a key part of your treatment and safety. Be sure to make and go to all appointments, and call your doctor if you are having problems. It's also a good idea to know your test results and keep a list of the medicines you take. How can you care for yourself at home? · Take all medicines exactly as prescribed. Call your doctor if you think you are having a problem with your medicine. · Get some extra rest.  · Take an over-the-counter pain medicine, such as acetaminophen (Tylenol), ibuprofen (Advil, Motrin), or naproxen (Aleve) to reduce fever and relieve body aches. Read and follow all instructions on the label. · Do not take two or more pain medicines at the same time unless the doctor told you to. Many pain medicines have acetaminophen, which is Tylenol. Too much acetaminophen (Tylenol) can be harmful. · Take an over-the-counter cough medicine that contains dextromethorphan to help quiet a dry, hacking cough so that you can sleep. Avoid cough medicines that have more than one active ingredient. Read and follow all instructions on the label. · Breathe moist air from a humidifier, hot shower, or sink filled with hot water. The heat and moisture will thin mucus so you can cough it out. · Do not smoke. Smoking can make bronchitis worse. If you need help quitting, talk to your doctor about stop-smoking programs and medicines. These can increase your chances of quitting for good.   When should you call for help? Call 911 anytime you think you may need emergency care. For example, call if:    · You have severe trouble breathing.    Call your doctor now or seek immediate medical care if:    · You have new or worse trouble breathing.     · You cough up dark brown or bloody mucus (sputum).     · You have a new or higher fever.     · You have a new rash.    Watch closely for changes in your health, and be sure to contact your doctor if:    · You cough more deeply or more often, especially if you notice more mucus or a change in the color of your mucus.     · You are not getting better as expected. Where can you learn more? Go to http://lonnie-parrish.info/. Enter H333 in the search box to learn more about \"Bronchitis: Care Instructions. \"  Current as of: June 9, 2019  Content Version: 12.2  © 3516-9576 [employer]. Care instructions adapted under license by [employer] (which disclaims liability or warranty for this information). If you have questions about a medical condition or this instruction, always ask your healthcare professional. [address] any warranty or liability for your use of this information. Asthma in Adults: Care Instructions  Your Care Instructions    During an asthma attack, your airways swell and narrow as a reaction to certain things (triggers). This makes it hard to breathe. You may be able to prevent asthma attacks if you avoid the things that set off your asthma symptoms. Keeping your asthma under control and treating symptoms before they get bad can help you avoid severe attacks. If you can control your asthma, you may be able to do all of your normal daily activities. You may also avoid asthma attacks and trips to the hospital.  Follow-up care is a key part of your treatment and safety. Be sure to make and go to all appointments, and call your doctor if you are having problems.  It's also a good idea to know your test results and keep a list of the medicines you take. How can you care for yourself at home? · Follow your asthma action plan so you can manage your symptoms at home. An asthma action plan will help you prevent and control airway reactions and will tell you what to do during an asthma attack. If you do not have an asthma action plan, work with your doctor to build one. · Take your asthma medicine exactly as prescribed. Medicine plays an important role in controlling asthma. Talk to your doctor right away if you have any questions about what to take and how to take it. ? Use your quick-relief medicine when you have symptoms of an attack. Quick-relief medicine often is an albuterol inhaler. Some people need to use quick-relief medicine before they exercise. ? Take your controller medicine every day, not just when you have symptoms. Controller medicine is usually an inhaled corticosteroid. The goal is to prevent problems before they occur. Do not use your controller medicine to try to treat an attack that has already started. It does not work fast enough to help. ? If your doctor prescribed corticosteroid pills to use during an attack, take them as directed. They may take hours to work, but they may shorten the attack and help you breathe better. ? Keep your quick-relief medicine with you at all times. · Talk to your doctor before using other medicines. Some medicines, such as aspirin, can cause asthma attacks in some people. · Check yourself for asthma symptoms to know which step to follow in your action plan. Watch for things like being short of breath, having chest tightness, coughing, and wheezing. Also notice if symptoms wake you up at night or if you get tired quickly when you exercise. · If you have a peak flow meter, use it to check how well you are breathing. This can help you predict when an asthma attack is going to occur.  Then you can take medicine to prevent the asthma attack or make it less severe. · See your doctor regularly. These visits will help you learn more about asthma and what you can do to control it. Your doctor will monitor your treatment to make sure the medicine is helping you. · Keep track of your asthma attacks and your treatment. After you have had an attack, write down what triggered it, what helped end it, and any concerns you have about your asthma action plan. Take your diary when you see your doctor. You can then review your asthma action plan and decide if it is working. · Do not smoke or allow others to smoke around you. Avoid smoky places. Smoking makes asthma worse. If you need help quitting, talk to your doctor about stop-smoking programs and medicines. These can increase your chances of quitting for good. · Learn what triggers an asthma attack for you, and avoid the triggers when you can. Common triggers include colds, smoke, air pollution, dust, pollen, mold, pets, cockroaches, stress, and cold air. · Avoid colds and the flu. Get a pneumococcal vaccine shot. If you have had one before, ask your doctor whether you need a second dose. Get a flu vaccine every fall. If you must be around people with colds or the flu, wash your hands often. When should you call for help? Call 911 anytime you think you may need emergency care. For example, call if:    · You have severe trouble breathing.    Call your doctor now or seek immediate medical care if:    · Your symptoms do not get better after you have followed your asthma action plan.     · You cough up yellow, dark brown, or bloody mucus (sputum).    Watch closely for changes in your health, and be sure to contact your doctor if:    · Your coughing and wheezing get worse.     · You need to use quick-relief medicine on more than 2 days a week (unless it is just for exercise).     · You need help figuring out what is triggering your asthma attacks. Where can you learn more?   Go to http://lonnie-parrish.info/. Enter P597 in the search box to learn more about \"Asthma in Adults: Care Instructions. \"  Current as of: June 9, 2019  Content Version: 12.2  © 3641-0779 [employer]. Care instructions adapted under license by [employer] (which disclaims liability or warranty for this information). If you have questions about a medical condition or this instruction, always ask your healthcare professional. [place] [address] any warranty or liability for your use of this information.

## 2020-03-10 NOTE — LETTER
NOTIFICATION RETURN TO WORK / SCHOOL 
 
3/10/2020 9:09 AM 
 
Ms. Shiela Frank [address] [id number] To Whom It May Concern: 
 
Shiela Frank is currently under the care of [address] [place] [address],[address]. She was seen in office today for bronchitis and exacerbation of asthma. She is not considered contagious but has been advised to work from home for the remainder of this week to avoid further flare of her asthma. She will return to work/school on: 3/16/2020. If there are questions or concerns please have the patient contact our office.  
 
 
 
Sincerely, 
 
 
Brisa Hansen NP

## 2020-05-01 ENCOUNTER — TELEPHONE (OUTPATIENT)
Dept: INTERNAL MEDICINE CLINIC | Age: 60
End: 2020-05-01

## 2020-05-01 RX ORDER — HYDROCHLOROTHIAZIDE 25 MG/1
TABLET ORAL
Qty: 30 TAB | Refills: 0 | Status: SHIPPED | OUTPATIENT
Start: 2020-05-01 | End: 2020-05-24

## 2020-05-01 NOTE — TELEPHONE ENCOUNTER
Dr. Reagan [place]   Received: Today   Message Contents   Lino Tai sent to [place] [id number]         Medication Refill     Caller (if not patient):       Relationship of caller (if not patient):       Best contact number(s):[phone]       Name of medication and dosage if known:Hydrochorothiazide       Is patient out of this medication (yes/no):yes       Pharmacy name:[facility]     Pharmacy listed in chart? (yes/no):yes   Pharmacy phone number:       Details to clarify the request:Patient is out of her medication and needs a refill of Hydrothiazide.  She has an appointment with you on Monday, May 4, 2020       Virginia Santiago

## 2020-05-04 ENCOUNTER — VIRTUAL VISIT (OUTPATIENT)
Dept: INTERNAL MEDICINE CLINIC | Age: 60
End: 2020-05-04

## 2020-05-04 VITALS — WEIGHT: 293 LBS | HEIGHT: 67 IN | BODY MASS INDEX: 45.99 KG/M2

## 2020-05-04 DIAGNOSIS — M54.32 SCIATICA, LEFT SIDE: Primary | ICD-10-CM

## 2020-05-04 DIAGNOSIS — H69.92 EUSTACHIAN TUBE DISORDER, LEFT: ICD-10-CM

## 2020-05-04 DIAGNOSIS — I10 ESSENTIAL HYPERTENSION: ICD-10-CM

## 2020-05-04 DIAGNOSIS — E03.9 HYPOTHYROIDISM, UNSPECIFIED TYPE: ICD-10-CM

## 2020-05-04 RX ORDER — TRAMADOL HYDROCHLORIDE 50 MG/1
50 TABLET ORAL
Qty: 30 TAB | Refills: 0 | Status: SHIPPED | OUTPATIENT
Start: 2020-05-04 | End: 2020-06-03

## 2020-05-04 RX ORDER — TRAMADOL HYDROCHLORIDE 50 MG/1
50 TABLET ORAL
COMMUNITY
End: 2020-05-04 | Stop reason: SDUPTHER

## 2020-05-04 NOTE — PROGRESS NOTES
Consent: Deng Quick, who was seen by synchronous (real-time) audio-video technology, and/or her healthcare decision maker, is aware that this patient-initiated, Telehealth encounter on 5/4/2020 is a billable service, with coverage as determined by her insurance carrier. She is aware that she may receive a bill and has provided verbal consent to proceed: YES  712  Subjective:   Deng Quick is a 64 y.o. female who was seen for Medication Refill and Back Pain (onset 2 days)    She is doing well. 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 Reports a 2-day history of lower back pain radiating down her left buttock and upper thigh. History of sciatica. Denies any injuries. No weakness of the leg or significant paresthesias. Denies any fever or chills. Denies urinary symptoms. Has been taking Tylenol without relief and takes occasional tramadol because she said that she gets diarrhea with NSAIDs now. Having some popping in her left ear. Is intermittent. Mildly muffled hearing. Will see ENT next week. Denies any other cold or flu symptoms. She had a couple of episodes of bronchitis and Asthma over the winter. Has a nebulizer which greatly helps when she has symptoms now. Hypothyroidism. She is seeing endocrinology Dr. Qasim Dacosta. Thyroid functions have been stable. Has an appointment again next month. Hypertension  Hypertension ROS: taking medications as instructed, no medication side effects noted, no TIA's, no chest pain on exertion, no dyspnea on exertion, no swelling of ankles     reports that she has never smoked. She has never used smokeless tobacco.    reports no history of alcohol use. BP Readings from Last 2 Encounters:   03/10/20 137/82   01/21/20 140/82       Current Outpatient Medications   Medication Sig    traMADoL (ULTRAM) 50 mg tablet Take 1 Tab by mouth every six (6) hours as needed for Pain for up to 30 days.  Max Daily Amount: 200 mg.   Isaias Quintero hydroCHLOROthiazide (HYDRODIURIL) 25 mg tablet TAKE 1 TABLET BY MOUTH EVERY DAY    albuterol (PROVENTIL VENTOLIN) 2.5 mg /3 mL (0.083 %) nebu 3 mL by Nebulization route every four (4) hours as needed for Wheezing.  budesonide-formoterol (SYMBICORT) 160-4.5 mcg/actuation HFAA Take 2 Puffs by inhalation two (2) times a day.  estradiol (ESTRACE) 1 mg tablet TAKE 1 TABLET BY MOUTH EVERY DAY    olmesartan (BENICAR) 40 mg tablet olmesartan 40 mg tablet    levothyroxine (SYNTHROID) 25 mcg tablet Take 12.5 mcg by mouth. 6 days a week    magnesium oxide (MAG-OX) 400 mg tablet Take 400 mg by mouth daily.  ferrous sulfate (IRON) 325 mg (65 mg iron) tablet Take  by mouth Daily (before breakfast).  SYNTHROID 150 mcg tablet Take 150 mcg by mouth Daily (before breakfast). Daily    ZINC PO Take 1 Tab by mouth daily.  ASCORBATE CALCIUM (VITAMIN C PO) Take 1 Tab by mouth daily.  cholecalciferol, vitamin D3, (VITAMIN D3) 4,000 unit cap Take 1 Tab by mouth daily.  VITAMIN B COMPLEX PO Take 1 Tab by mouth daily. No current facility-administered medications for this visit. Allergies   Allergen Reactions    Effexor [Venlafaxine] Other (comments)     Htn,nose bleeding,increase in anxiety    Gluten Other (comments)     Irritable bowel symptoms, bloating     Levaquin [Levofloxacin] Myalgia     Per pt muscle weakness    Lisinopril Diarrhea    Metformin Diarrhea    Other Medication Swelling     Family of grains       Past Medical History:   Diagnosis Date    AR (allergic rhinitis)     Dr. Filiberto Macias BCC (basal cell carcinoma of skin)     BCC skin cancer right ankle    Biliary dyskinesia     s/p azeb    Depression     Endometriosis     [facility] 10/2008 Dr. Cherylene Bianchi Environmental allergies 10/2010    oats, barley, cotton seed.   Dr. Jorge L Noland    Gluten intolerance     HTN (hypertension)     Hyperlipidemia LDL goal < 130     Hypothyroidism     Dr. Nicole Farley    Impaired fasting glucose     105 7/2008    Menopausal disorder     MR (mitral regurgitation)     mild    FRANSISCA (obstructive sleep apnea) 11/11/2018    mild.   [facility]    Sebaceous cyst of breast     Dr. Lucia Brian Skin abnormalities 8/2010    pre-cancerous lesions of face, Dr. Mandi Castillo D deficiency     VSD (ventricular septal defect)     self-closed age 11, TTE normal 3/09       ROS  All other systems reviewed and negative, unless mentioned in HPI    Objective:   Vital Signs: (As obtained by patient/caregiver at home)  Visit Vitals  Ht 5' 7\" (1.702 m)   Wt 327 lb (148.3 kg)   LMP 08/20/2000   BMI 51.22 kg/m²        [INSTRUCTIONS:  \"[x]\" Indicates a positive item  \"[]\" Indicates a negative item  -- DELETE ALL ITEMS NOT EXAMINED]    Constitutional: [x] Appears well-developed and well-nourished [x] No apparent distress      [] Abnormal -     Mental status: [x] Alert and awake  [x] Oriented to person/place/time [x] Able to follow commands    [] Abnormal -     Eyes:   EOM    [x]  Normal    [] Abnormal -   Sclera  [x]  Normal    [] Abnormal -          Discharge [x]  None visible   [] Abnormal -     HENT: [x] Normocephalic, atraumatic  [] Abnormal -   [x] Mouth/Throat: Mucous membranes are moist    External Ears [x] Normal  [] Abnormal -    Neck: [x] No visualized mass [] Abnormal -     Pulmonary/Chest: [x] Respiratory effort normal   [x] No visualized signs of difficulty breathing or respiratory distress        [] Abnormal -      Musculoskeletal:   [x] Normal gait with no signs of ataxia         [x] Normal range of motion of neck        [] Abnormal -     Neurological:        [x] No Facial Asymmetry (Cranial nerve 7 motor function) (limited exam due to video visit)          [x] No gaze palsy        [] Abnormal -          Skin:        [x] No significant exanthematous lesions or discoloration noted on facial skin         [] Abnormal -            Psychiatric:       [x] Normal Affect [] Abnormal -        [x] No Hallucinations    Other pertinent observable physical exam findings:-        Assessment & Plan:   Diagnoses and all orders for this visit:    1. Sciatica, left side   Stretching exercises demonstrated. Refill tramadol to use as needed. Last refilled 15 pills on February 12. Using sparingly.  reviewed. -     traMADoL (ULTRAM) 50 mg tablet; Take 1 Tab by mouth every six (6) hours as needed for Pain for up to 30 days. Max Daily Amount: 200 mg.    2. Eustachian tube disorder, left  Suspect. We will see ENT. 3. Essential hypertension  Recommend checking blood pressures at home. If she is having a bladder pressure monitor at endocrinology visits. Has been stable. Recommend checking at home or at the pharmacy if possible. 4. Hypothyroidism, unspecified type   Currently asymptomatic  Seeing endocrinology           We discussed the expected course, resolution and complications of the diagnosis(es) in detail. Medication risks, benefits, costs, interactions, and alternatives were discussed as indicated. I advised her to contact the office if her condition worsens, changes or fails to improve as anticipated. She expressed understanding with the diagnosis(es) and plan. Socorro Tinajero is a 64 y.o. female being evaluated by a video visit encounter for concerns as above. A caregiver was present when appropriate. Due to this being a TeleHealth encounter (During W[facility] public [facility] emergency), evaluation of the following organ systems was limited: Vitals/Constitutional/EENT/Resp/CV/GI//MS/Neuro/Skin/Heme-Lymph-Imm. Pursuant to the emergency declaration under the [facility], 1135 waiver authority and the [employer] and [employer] Act, this Virtual  Visit was conducted, with patient's (and/or legal guardian's) consent, to reduce the patient's risk of exposure to COVID-19 and provide necessary medical care.      Services were provided through a video synchronous discussion virtually to substitute for in-person clinic visit. Patient and provider were located at their individual homes.     Neto Segal MD

## 2020-05-27 RX ORDER — ESTRADIOL 1 MG/1
TABLET ORAL
Qty: 30 TAB | Refills: 5 | Status: SHIPPED | OUTPATIENT
Start: 2020-05-27 | End: 2020-11-20

## 2020-05-27 NOTE — TELEPHONE ENCOUNTER
64year old patient last seen in the office on 11/5/2019 and had the requested prescription was sent for 7 months.         ? Ok to send till next AE due    rx pended for amend/sign

## 2020-11-19 RX ORDER — HYDROCHLOROTHIAZIDE 25 MG/1
TABLET ORAL
Qty: 30 TAB | Refills: 5 | Status: SHIPPED | OUTPATIENT
Start: 2020-11-19 | End: 2021-05-14

## 2020-11-20 RX ORDER — ESTRADIOL 1 MG/1
TABLET ORAL
Qty: 30 TAB | Refills: 5 | Status: SHIPPED | OUTPATIENT
Start: 2020-11-20 | End: 2021-03-09 | Stop reason: SDUPTHER

## 2020-11-20 NOTE — TELEPHONE ENCOUNTER
Next AE 1/26/21    Last AE 11/5/19    Pharmacy request for Estradiol 1 mg 1 tab every day    Okay to refill since not seen in 2020 for anything?   Pended order

## 2021-03-08 NOTE — PROGRESS NOTES
Iman Schafer is a No obstetric history on file. ,  64 y.o. female WHITE whose Patient's last menstrual period was 08/20/2000. who presents for her annual checkup. She is having no significant problems. Hormone Status:    She is not having vasomotor symptoms. The patient is using HRT: estrace    Sexual history:    She  reports previously being sexually active. Medical conditions:    Since her last annual GYN exam about two years ago, she has had the following changes in her health history: hypothyroidism and bronchitis       Pap and Mammogram History:    Her most recent Pap smear was 10/10/2018 normal/HPV neg    The patient had her mammogram today in our office. Breast Cancer History/Substance Abuse:    She has a family history of breast cancer. Osteoporosis History:    Family history does not include a first or second degree relative with osteopenia or osteoporosis. A bone density scan has not been previously obtained. Past Medical History:   Diagnosis Date    AR (allergic rhinitis)     Dr. Gokul Lynch BCC (basal cell carcinoma of skin)     BCC skin cancer right ankle    Biliary dyskinesia     s/p azeb    Depression     Endometriosis     [facility] 10/2008 Dr. Lis Zhou Environmental allergies 10/2010    oats, barley, cotton seed. Dr. Daja Lind    Gluten intolerance     HTN (hypertension)     Hyperlipidemia LDL goal < 130     Hypothyroidism     Dr. Adi Del Toro Impaired fasting glucose     105 7/2008    Menopausal disorder     MR (mitral regurgitation)     mild    FRANSISCA (obstructive sleep apnea) 11/11/2018    mild.   [facility]    Sebaceous cyst of breast     Dr. Ngozi Vargas    Skin abnormalities 8/2010    pre-cancerous lesions of face, Dr. [facility] Vitamin D deficiency     VSD (ventricular septal defect)     self-closed age 11, TTE normal 3/09     Past Surgical History:   Procedure Laterality Date    COLONOSCOPY  9/2009    Dr. Mata [place] HX COLONOSCOPY  07/29/2015    normal.  Dr. Delphien Rueda CYST REMOVAL  1983    HX CYST REMOVAL  06/2017    Dr. Edgardo Maciel HX ENDOSCOPY  07/29/2015    gastritis    HX HEART CATHETERIZATION      age 5, age 11 due to VSD    HX HYSTERECTOMY      supracervical    HX LAP CHOLECYSTECTOMY  8/01/2014    Dr. Ara Medina/Kun and adhesions    HX SALPINGO-OOPHORECTOMY      HX TONSILLECTOMY      WI FEMUR/KNEE SURG UNLISTED  12/30/2010    left, had lipoma removed. Dr. Aniya Lucio     Tobacco History:  reports that she has never smoked. She has never used smokeless tobacco.  Alcohol Abuse:  reports no history of alcohol use. Drug Abuse:  reports no history of drug use. Current Outpatient Medications   Medication Sig Dispense Refill    estradioL (ESTRACE) 1 mg tablet TAKE 1 TABLET BY MOUTH EVERY DAY 30 Tab 5    hydroCHLOROthiazide (HYDRODIURIL) 25 mg tablet TAKE 1 TABLET BY MOUTH EVERY DAY 30 Tab 5    albuterol (PROVENTIL VENTOLIN) 2.5 mg /3 mL (0.083 %) nebu 3 mL by Nebulization route every four (4) hours as needed for Wheezing. 30 Nebule 2    budesonide-formoterol (SYMBICORT) 160-4.5 mcg/actuation HFAA Take 2 Puffs by inhalation two (2) times a day. 1 Inhaler 1    olmesartan (BENICAR) 40 mg tablet olmesartan 40 mg tablet      levothyroxine (SYNTHROID) 25 mcg tablet Take 12.5 mcg by mouth. 6 days a week      magnesium oxide (MAG-OX) 400 mg tablet Take 400 mg by mouth daily.  ferrous sulfate (IRON) 325 mg (65 mg iron) tablet Take  by mouth Daily (before breakfast).  SYNTHROID 150 mcg tablet Take 150 mcg by mouth Daily (before breakfast). Daily  6    ZINC PO Take 1 Tab by mouth daily.  ASCORBATE CALCIUM (VITAMIN C PO) Take 1 Tab by mouth daily.  cholecalciferol, vitamin D3, (VITAMIN D3) 4,000 unit cap Take 1 Tab by mouth daily.  VITAMIN B COMPLEX PO Take 1 Tab by mouth daily.        Allergies: Effexor [venlafaxine], Gluten, Levaquin [levofloxacin], Lisinopril, Metformin, and Other medication   Social History     Socioeconomic History    Marital status: SINGLE     Spouse name: Not on file    Number of children: 0    Years of education: Not on file    Highest education level: Not on file   Occupational History    Not on file   Social Needs    Financial resource strain: Not on file    Food insecurity     Worry: Not on file     Inability: Not on file    Transportation needs     Medical: Not on file     Non-medical: Not on file   Tobacco Use    Smoking status: Never Smoker    Smokeless tobacco: Never Used   Substance and Sexual Activity    Alcohol use: No    Drug use: No    Sexual activity: Not Currently   Lifestyle    Physical activity     Days per week: Not on file     Minutes per session: Not on file    Stress: Not on file   Relationships    Social connections     Talks on phone: Not on file     Gets together: Not on file     Attends [employer] service: Not on file     Active member of club or organization: Not on file     Attends meetings of clubs or organizations: Not on file     Relationship status: Not on file    Intimate partner violence     Fear of current or ex partner: Not on file     Emotionally abused: Not on file     Physically abused: Not on file     Forced sexual activity: Not on file   Other Topics Concern    Not on file   Social History Narrative    Not on file     Patient Active Problem List   Diagnosis Code    HTN (hypertension) I10    Hypothyroidism E03.9    AR (allergic rhinitis) J30.9    Impaired fasting glucose R73.01    Biliary dyskinesia K82.8    Endometriosis N80.9    Hyperlipidemia with target LDL less than 130 E78.5    VSD (ventricular septal defect) Q21.0    Menopausal disorder N95.9    Depression F32.9    Gluten intolerance K90.41    Vitamin D deficiency E55.9    MR (mitral regurgitation) I34.0    Sebaceous cyst of skin of breast N60.89    Sebaceous cyst of breast N60.89    Skin abnormalities L98.9    Environmental allergies Z91.09    Obesity, morbid ([address] E66.01    Stress incontinence of urine N39.3    FRANSISCA (obstructive sleep apnea) G47.33       Review of Systems - History obtained from the patient  Constitutional: negative for weight loss, fever, night sweats  HEENT: negative for hearing loss, earache, congestion, snoring, sorethroat  CV: negative for chest pain, palpitations, edema  Resp: negative for cough, shortness of breath, wheezing  GI: negative for change in bowel habits, abdominal pain, black or bloody stools  : negative for frequency, dysuria, hematuria, vaginal discharge  MSK: negative for back pain, joint pain, muscle pain  Breast: negative for breast lumps, nipple discharge, galactorrhea  Skin :negative for itching, rash, hives  Neuro: negative for dizziness, headache, confusion, weakness  Psych: negative for anxiety, depression, change in mood  Heme/lymph: negative for bleeding, bruising, pallor    Physical Exam    Visit Vitals  /64   Ht 5' 7\" (1.702 m)   Wt 334 lb (151.5 kg)   LMP 08/20/2000   BMI 52.31 kg/m²     Constitutional  · Appearance: well-nourished, well developed, alert, in no acute distress    HENT  · Head and Face: appears normal    Neck  · Inspection/Palpation: normal appearance, no masses or tenderness  Lymph Nodes: no lymphadenopathy present    Chest  · Respiratory Effort: breathing normal    Breasts  · Inspection of Breasts: breasts symmetrical, no skin changes, no discharge present, nipple appearance normal, no skin retraction present  · Palpation of Breasts and Axillae: no masses present on palpation, no breast tenderness  · Axillary Lymph Nodes: no lymphadenopathy present    Gastrointestinal  · Abdominal Examination: abdomen non-tender to palpation, normal bowel sounds, no masses present  · Liver and spleen: no hepatomegaly present, spleen not palpable  · Hernias: no hernias identified    Genitourinary  · External Genitalia: normal appearance for age, no discharge present, no tenderness present, no inflammatory lesions present, no masses present, no atrophy present  · Vagina: normal vaginal vault without central or paravaginal defects, no discharge present, no inflammatory lesions present, no masses present  · Bladder: non-tender to palpation  · Urethra: appears normal  · Cervix: absent  · Uterus: absent  · Adnexa: no adnexal tenderness present, no adnexal masses present  · Perineum: perineum within normal limits, no evidence of trauma, no rashes or skin lesions present  · Anus: anus within normal limits, no hemorrhoids present  · Inguinal Lymph Nodes: no lymphadenopathy present      Skin  · General Inspection: no rash, no lesions identified    Neurologic/Psychiatric  · Mental Status:  · Orientation: grossly oriented to person, place and time  · Mood and Affect: mood normal, affect appropriate    . Assessment:  Routine gynecologic examination  Her current medical status is satisfactory with no evidence of significant gynecologic issues.     Plan:  Counseled re: diet, exercise, healthy lifestyle  Return for yearly wellness visits  Rec annual mammogram  Patient Verbalized understanding

## 2021-03-09 ENCOUNTER — OFFICE VISIT (OUTPATIENT)
Dept: OBGYN CLINIC | Age: 61
End: 2021-03-09

## 2021-03-09 VITALS
DIASTOLIC BLOOD PRESSURE: 64 MMHG | WEIGHT: 293 LBS | BODY MASS INDEX: 45.99 KG/M2 | SYSTOLIC BLOOD PRESSURE: 128 MMHG | HEIGHT: 67 IN

## 2021-03-09 DIAGNOSIS — Z01.419 WELL WOMAN EXAM WITH ROUTINE GYNECOLOGICAL EXAM: Primary | ICD-10-CM

## 2021-03-09 PROCEDURE — 99396 PREV VISIT EST AGE 40-64: CPT | Performed by: OBSTETRICS & GYNECOLOGY

## 2021-03-09 RX ORDER — ESTRADIOL 1 MG/1
1 TABLET ORAL DAILY
Qty: 90 TAB | Refills: 3 | Status: SHIPPED | OUTPATIENT
Start: 2021-03-09 | End: 2021-10-01 | Stop reason: ALTCHOICE

## 2021-03-09 NOTE — PATIENT INSTRUCTIONS
Well Visit, Women 48 to 72: Care Instructions  Your Care Instructions     Physical exams can help you stay healthy. Your doctor has checked your overall health and may have suggested ways to take good care of yourself. He or she also may have recommended tests. At home, you can help prevent illness with healthy eating, regular exercise, and other steps. Follow-up care is a key part of your treatment and safety. Be sure to make and go to all appointments, and call your doctor if you are having problems. It's also a good idea to know your test results and keep a list of the medicines you take. How can you care for yourself at home? · Reach and stay at a healthy weight. This will lower your risk for many problems, such as obesity, diabetes, heart disease, and high blood pressure. · Get at least 30 minutes of exercise on most days of the week. Walking is a good choice. You also may want to do other activities, such as running, swimming, cycling, or playing tennis or team sports. · Do not smoke. Smoking can make health problems worse. If you need help quitting, talk to your doctor about stop-smoking programs and medicines. These can increase your chances of quitting for good. · Protect your skin from too much sun. When you're outdoors from 10 a.m. to 4 p.m., stay in the shade or cover up with clothing and a hat with a wide brim. Wear sunglasses that block UV rays. Even when it's cloudy, put broad-spectrum sunscreen (SPF 30 or higher) on any exposed skin. · See a dentist one or two times a year for checkups and to have your teeth cleaned. · Wear a seat belt in the car. Follow your doctor's advice about when to have certain tests. These tests can spot problems early. · Cholesterol. Your doctor will tell you how often to have this done based on your age, family history, or other things that can increase your risk for heart attack and stroke. · Blood pressure.  Have your blood pressure checked during a routine doctor visit. Your doctor will tell you how often to check your blood pressure based on your age, your blood pressure results, and other factors. · Mammogram. Ask your doctor how often you should have a mammogram, which is an X-ray of your breasts. A mammogram can spot breast cancer before it can be felt and when it is easiest to treat. · Pap test and pelvic exam. Ask your doctor how often you should have a Pap test. You may not need to have a Pap test as often as you used to. · Vision. Have your eyes checked every year or two or as often as your doctor suggests. Some experts recommend that you have yearly exams for glaucoma and other age-related eye problems starting at age 48. · Hearing. Tell your doctor if you notice any change in your hearing. You can have tests to find out how well you hear. · Diabetes. Ask your doctor whether you should have tests for diabetes. · Colorectal cancer. Your risk for colorectal cancer gets higher as you get older. Some experts say that adults should start regular screening at age 48 and stop at age 76. Others say to start before age 48 or continue after age 76. Talk with your doctor about your risk and when to start and stop screening. · Thyroid disease. Talk to your doctor about whether to have your thyroid checked as part of a regular physical exam. Women have an increased chance of a thyroid problem. · Osteoporosis. You should begin tests for bone density at age 72. If you are younger than 72, ask your doctor whether you have factors that may increase your risk for this disease. You may want to have this test before age 72. · Heart attack and stroke risk. At least every 4 to 6 years, you should have your risk for heart attack and stroke assessed. Your doctor uses factors such as your age, blood pressure, cholesterol, and whether you smoke or have diabetes to show what your risk for a heart attack or stroke is over the next 10 years.   When should you call for help?  Watch closely for changes in your health, and be sure to contact your doctor if you have any problems or symptoms that concern you. Where can you learn more? Go to http://www.gray.com/  Enter [id number] in the search box to learn more about \"Well Visit, Women 50 to 72: Care Instructions. \"  Current as of: May 27, 2020               Content Version: 12.6  © 2006-2020 [employer]. Care instructions adapted under license by [employer] (which disclaims liability or warranty for this information). If you have questions about a medical condition or this instruction, always ask your healthcare professional. [address] any warranty or liability for your use of this information.

## 2021-03-23 ENCOUNTER — OFFICE VISIT (OUTPATIENT)
Dept: INTERNAL MEDICINE CLINIC | Age: 61
End: 2021-03-23
Attending: NURSE PRACTITIONER
Payer: COMMERCIAL

## 2021-03-23 ENCOUNTER — TELEPHONE (OUTPATIENT)
Dept: INTERNAL MEDICINE CLINIC | Age: 61
End: 2021-03-23

## 2021-03-23 ENCOUNTER — APPOINTMENT (OUTPATIENT)
Dept: CT IMAGING | Age: 61
End: 2021-03-23
Attending: NURSE PRACTITIONER

## 2021-03-23 VITALS
RESPIRATION RATE: 16 BRPM | WEIGHT: 293 LBS | BODY MASS INDEX: 45.99 KG/M2 | OXYGEN SATURATION: 98 % | SYSTOLIC BLOOD PRESSURE: 130 MMHG | HEIGHT: 67 IN | HEART RATE: 86 BPM | DIASTOLIC BLOOD PRESSURE: 74 MMHG | TEMPERATURE: 98.1 F

## 2021-03-23 DIAGNOSIS — M54.50 LOW BACK PAIN, UNSPECIFIED BACK PAIN LATERALITY, UNSPECIFIED CHRONICITY, UNSPECIFIED WHETHER SCIATICA PRESENT: Primary | ICD-10-CM

## 2021-03-23 DIAGNOSIS — K62.5 BRIGHT RED RECTAL BLEEDING: ICD-10-CM

## 2021-03-23 DIAGNOSIS — R10.9 ACUTE LEFT FLANK PAIN: ICD-10-CM

## 2021-03-23 LAB
BILIRUB UR QL STRIP: NEGATIVE
GLUCOSE UR-MCNC: NEGATIVE MG/DL
KETONES P FAST UR STRIP-MCNC: NEGATIVE MG/DL
PH UR STRIP: 6 [PH] (ref 4.6–8)
PROT UR QL STRIP: NEGATIVE
SP GR UR STRIP: 1.01 (ref 1–1.03)
UA UROBILINOGEN AMB POC: NORMAL (ref 0.2–1)
URINALYSIS CLARITY POC: CLEAR
URINALYSIS COLOR POC: YELLOW
URINE BLOOD POC: NEGATIVE
URINE LEUKOCYTES POC: NEGATIVE
URINE NITRITES POC: NEGATIVE

## 2021-03-23 PROCEDURE — 81003 URINALYSIS AUTO W/O SCOPE: CPT | Performed by: NURSE PRACTITIONER

## 2021-03-23 PROCEDURE — 99214 OFFICE O/P EST MOD 30 MIN: CPT | Performed by: NURSE PRACTITIONER

## 2021-03-23 RX ORDER — PHENTERMINE HYDROCHLORIDE 37.5 MG/1
37.5 TABLET ORAL
COMMUNITY
End: 2022-10-28 | Stop reason: ALTCHOICE

## 2021-03-23 RX ORDER — HYDROCODONE BITARTRATE AND ACETAMINOPHEN 5; 325 MG/1; MG/1
1 TABLET ORAL
Qty: 20 TAB | Refills: 0 | Status: SHIPPED | OUTPATIENT
Start: 2021-03-23 | End: 2021-03-28

## 2021-03-23 RX ORDER — TRAMADOL HYDROCHLORIDE 50 MG/1
50 TABLET ORAL
Qty: 20 TAB | Refills: 0 | Status: CANCELLED | OUTPATIENT
Start: 2021-03-23 | End: 2021-03-28

## 2021-03-23 RX ORDER — LEVOTHYROXINE SODIUM 175 UG/1
175 TABLET ORAL
COMMUNITY
End: 2021-10-01

## 2021-03-23 NOTE — PATIENT INSTRUCTIONS
Flank Pain: Care Instructions  Your Care Instructions  Flank pain is pain on the side of the back just below the rib cage and above the waist. It can be on one or both sides. Flank pain has many possible causes, including a kidney stone, a urinary tract infection, or back strain. Flank pain may get better on its own. But don't ignore new symptoms, such as fever, nausea and vomiting, urination problems, pain that gets worse, and dizziness. These may be signs of a more serious problem. You may have to have tests or other treatment. Follow-up care is a key part of your treatment and safety. Be sure to make and go to all appointments, and call your doctor if you are having problems. It's also a good idea to know your test results and keep a list of the medicines you take. How can you care for yourself at home? · Rest until you feel better. · Take pain medicines exactly as directed. ? If the doctor gave you a prescription medicine for pain, take it as prescribed. ? If you are not taking a prescription pain medicine, ask your doctor if you can take an over-the-counter pain medicine, such as acetaminophen (Tylenol), ibuprofen (Advil, Motrin), or naproxen (Aleve). Read and follow all instructions on the label. · If your doctor prescribed antibiotics, take them as directed. Do not stop taking them just because you feel better. You need to take the full course of antibiotics. · To apply heat, put a warm water bottle, a heating pad set on low, or a warm cloth on the painful area. Do not go to sleep with a heating pad on your skin. · To prevent dehydration, drink plenty of fluids, enough so that your urine is light yellow or clear like water. Choose water and other caffeine-free clear liquids until you feel better. If you have kidney, heart, or liver disease and have to limit fluids, talk with your doctor before you increase the amount of fluids you drink. When should you call for help?    Call your doctor now or seek immediate medical care if:    · Your flank pain gets worse.     · You have new symptoms, such as fever, nausea, or vomiting.     · You have symptoms of a urinary problem. For example:  ? You have blood or pus in your urine. ? You have chills or body aches. ? It hurts to urinate. ? You have groin or belly pain. Watch closely for changes in your health, and be sure to contact your doctor if you do not get better as expected. Where can you learn more? Go to http://www.cleaning.com/  Enter S191 in the search box to learn more about \"Flank Pain: Care Instructions. \"  Current as of: June 26, 2019               Content Version: 12.6  © 5646-0121 [employer]. Care instructions adapted under license by [employer] (which disclaims liability or warranty for this information). If you have questions about a medical condition or this instruction, always ask your healthcare professional. [place] [address] any warranty or liability for your use of this information. Lower Gastrointestinal Bleeding: Care Instructions  Your Care Instructions     The digestive or gastrointestinal tract goes from the mouth to the anus. It is often called the GI tract. Bleeding in the lower GI tract can happen anywhere in your small or large intestine. It can also happen in your rectum or anus. In some cases, it is caused by an infection, cancer, or inflammatory bowel disease. Or it may be caused by hemorrhoids, diverticulitis, or clotting problems. Light bleeding may not cause any symptoms at first. But if you continue to bleed for a while, you may feel very weak or tired. Sudden, heavy bleeding means you need to see a doctor right away. This kind of bleeding can be very dangerous. But it can usually be cured or controlled. The doctor may do some tests to find the cause of your bleeding. Follow-up care is a key part of your treatment and safety.  Be sure to make and go to all appointments, and call your doctor if you are having problems. It's also a good idea to know your test results and keep a list of the medicines you take. How can you care for yourself at home? · Be safe with medicines. Take your medicines exactly as prescribed. Call your doctor if you think you are having a problem with your medicine. You will get more details on the specific medicines your doctor prescribes. · Do not take aspirin or other anti-inflammatory medicines, such as naproxen (Aleve) or ibuprofen (Advil, Motrin), without talking to your doctor first. Ask your doctor if it is okay to use acetaminophen (Tylenol). · Do not drink alcohol. · The bleeding may make you lose iron. So it's important to eat foods that have a lot of iron. These include red meat, shellfish, poultry, and eggs. They also include beans, raisins, whole-grain breads, and leafy green vegetables. If you want help planning meals, you can meet with a dietitian. When should you call for help? Call 911 anytime you think you may need emergency care. For example, call if:    · You have sudden, severe belly pain.     · You vomit blood or what looks like coffee grounds.     · You passed out (lost consciousness).     · Your stools are maroon or very bloody. Call your doctor now or seek immediate medical care if:    · You are dizzy or lightheaded, or you feel like you may faint.     · Your stools are black and look like tar, or they have streaks of blood.     · You have belly pain.     · You vomit or have nausea. Watch closely for changes in your health, and be sure to contact your doctor if you do not get better as expected. Where can you learn more? Go to http://www.gray.com/  Enter [id number] in the search box to learn more about \"Lower Gastrointestinal Bleeding: Care Instructions. \"  Current as of: June 26, 2019               Content Version: 12.6  © 7518-9738 [employer].    Care instructions adapted under license by [employer] (which disclaims liability or warranty for this information). If you have questions about a medical condition or this instruction, always ask your healthcare professional. [address] any warranty or liability for your use of this information.

## 2021-03-23 NOTE — TELEPHONE ENCOUNTER
STAT CT scheduled for today at 7pm stf location, pt needs to arrive at 5pm. Nothing to eat, water only. Pt was given appt information.

## 2021-03-23 NOTE — PROGRESS NOTES
Funmilayo Katz (: 1960) is a 64 y.o. female, established patient, here for evaluation of the following chief complaint(s):  Back Pain (started this morining - feels like she was being stabbed ) and Other (blood in the stool - not straining )       ASSESSMENT/PLAN:  1. Low back pain, unspecified back pain laterality, unspecified chronicity, unspecified whether sciatica present  -     AMB POC URINALYSIS DIP STICK AUTO W/O MICRO    2. Acute left flank pain -- needs evaluation for possible renal calculi; urine dip negative for blood but clearly having pain over left kidney  -     CT ABD PELV W CONT; Future  -     HYDROcodone-acetaminophen (NORCO) 5-325 mg per tablet; Take 1 Tab by mouth every six (6) hours as needed for Pain for up to 5 days. Max Daily Amount: 4 Tabs., Normal, Disp-20 Tab, R-0    3. Bright red rectal bleeding -- concern about sudden onset of significant amount of bright red blood per rectum; sent for Stat CT scan and will have her evaluated with colon-rectal specialist as well. Advised to avoid straining at passing stool.  -     CT ABD PELV W CONT; Future  -     REFERRAL TO COLON AND RECTAL SURGERY        SUBJECTIVE/OBJECTIVE:  HPI    Presents with complaints of sudden onset of severe left sided flank pain that began this am.  Reports pain was sharp and stabbing and she was doubled over; 10/10 on pain scale. Pain eased after several minutes but she has constant dull aching pain in same area and pain is now constant at 4-5/10. Denies recent injury, nausea, vomiting. Denies history of renal calculi. Has not noted any gross blood in urine but passed significant amount of bright red blood per rectum this morning and again 2 hours later with appearance of clots as well. Has used heating pad on left flank with minimal improvement. Has recently been eating more salads and vegetables. Denies straining at defecation.   Reports she had some lower back discomfort over the weekend after doing yardwork and took Diclofenac x 2 doses but none since. Denies fever, chills. Patient Active Problem List   Diagnosis Code    HTN (hypertension) I10    Hypothyroidism E03.9    AR (allergic rhinitis) J30.9    Impaired fasting glucose R73.01    Biliary dyskinesia K82.8    Endometriosis N80.9    Hyperlipidemia with target LDL less than 130 E78.5    VSD (ventricular septal defect) Q21.0    Menopausal disorder N95.9    Depression F32.9    Gluten intolerance K90.41    Vitamin D deficiency E55.9    MR (mitral regurgitation) I34.0    Sebaceous cyst of skin of breast N60.89    Sebaceous cyst of breast N60.89    Skin abnormalities L98.9    Environmental allergies Z91.09    Obesity, morbid (HCC) E66.01    Stress incontinence of urine N39.3    FRANSISCA (obstructive sleep apnea) G47.33     Past Surgical History:   Procedure Laterality Date    COLONOSCOPY  9/2009    Dr. Sulaiman Esquivel    HX COLONOSCOPY  07/29/2015    normal.  Dr. Mayda Collado     [address]    HX CYST REMOVAL  06/2017    Dr. Isha Call HX ENDOSCOPY  07/29/2015    gastritis    HX HEART CATHETERIZATION      age 5, age 11 due to VSD    HX HYSTERECTOMY      supracervical    HX LAP CHOLECYSTECTOMY  8/01/2014    Dr. Karen Medina/Kun and adhesions    HX SALPINGO-OOPHORECTOMY      HX TONSILLECTOMY      WA FEMUR/KNEE SURG UNLISTED  12/30/2010    left, had lipoma removed. Dr. Delmi Mcrae History     Socioeconomic History    Marital status: SINGLE     Spouse name: Not on file    Number of children: 0    Years of education: Not on file    Highest education level: Not on file   Occupational History    Not on file   Social Needs    Financial resource strain: Not on file    Food insecurity     Worry: Not on file     Inability: Not on file    Transportation needs     Medical: Not on file     Non-medical: Not on file   Tobacco Use    Smoking status: Never Smoker    Smokeless tobacco: Never Used   Substance and Sexual Activity    Alcohol use:  No  Drug use: No    Sexual activity: Not Currently   Lifestyle    Physical activity     Days per week: Not on file     Minutes per session: Not on file    Stress: Not on file   Relationships    Social connections     Talks on phone: Not on file     Gets together: Not on file     Attends Taoist service: Not on file     Active member of club or organization: Not on file     Attends meetings of clubs or organizations: Not on file     Relationship status: Not on file    Intimate partner violence     Fear of current or ex partner: Not on file     Emotionally abused: Not on file     Physically abused: Not on file     Forced sexual activity: Not on file   Other Topics Concern    Not on file   Social History Narrative    Not on file     Family History   Problem Relation Age of Onset    Cancer Mother         breast age 48    Stroke Mother     Heart Disease Father         CABGx4    High Cholesterol Father     Deep Vein Thrombosis Father         left arm    Diabetes Maternal Grandmother     Heart Disease Paternal Grandfather     High Cholesterol Paternal Grandfather     Heart Attack Paternal Grandfather     Arthritis-rheumatoid Paternal Grandmother     Thyroid Disease Paternal Grandmother     Osteoporosis Maternal Aunt      Current Outpatient Medications   Medication Sig    levothyroxine (Synthroid) 175 mcg tablet Take 175 mcg by mouth Daily (before breakfast).  phentermine (ADIPEX-P) 37.5 mg tablet Take 37.5 mg by mouth every morning. Taking 1/2 tab daily    HYDROcodone-acetaminophen (NORCO) 5-325 mg per tablet Take 1 Tab by mouth every six (6) hours as needed for Pain for up to 5 days. Max Daily Amount: 4 Tabs.  estradioL (ESTRACE) 1 mg tablet Take 1 Tab by mouth daily.  hydroCHLOROthiazide (HYDRODIURIL) 25 mg tablet TAKE 1 TABLET BY MOUTH EVERY DAY    albuterol (PROVENTIL VENTOLIN) 2.5 mg /3 mL (0.083 %) nebu 3 mL by Nebulization route every four (4) hours as needed for Wheezing.     olmesartan (BENICAR) 40 mg tablet olmesartan 40 mg tablet    magnesium oxide (MAG-OX) 400 mg tablet Take 400 mg by mouth daily.  ferrous sulfate (IRON) 325 mg (65 mg iron) tablet Take  by mouth Daily (before breakfast).  ZINC PO Take 1 Tab by mouth daily.  ASCORBATE CALCIUM (VITAMIN C PO) Take 1 Tab by mouth daily.  cholecalciferol, vitamin D3, (VITAMIN D3) 4,000 unit cap Take 1 Tab by mouth daily.  VITAMIN B COMPLEX PO Take 1 Tab by mouth daily.  budesonide-formoterol (SYMBICORT) 160-4.5 mcg/actuation HFAA Take 2 Puffs by inhalation two (2) times a day. No current facility-administered medications for this visit. Allergies   Allergen Reactions    Effexor [Venlafaxine] Other (comments)     Htn,nose bleeding,increase in anxiety    Gluten Other (comments)     Irritable bowel symptoms, bloating     Levaquin [Levofloxacin] Myalgia     Per pt muscle weakness    Lisinopril Diarrhea    Metformin Diarrhea    Other Medication Swelling     Family of grains    Tramadol Other (comments)     Caused hair loss     Immunization History   Administered Date(s) Administered    Influenza Vaccine 08/24/2016, 08/01/2017, 10/18/2018, 09/04/2020    Influenza Vaccine (Quad) PF (>6 Mo Flulaval, Fluarix, and >3 Yrs Afluria, Fluzone 88865) 10/18/2018, 10/04/2019    Influenza Vaccine Whole 09/21/2010    Pneumococcal Polysaccharide (PPSV-23) 11/01/2018    TD Vaccine 03/05/2006    TDAP Vaccine 08/31/2011         Review of Systems   Constitutional: Negative for chills, fatigue and fever. Respiratory: Negative for cough, chest tightness and shortness of breath. Cardiovascular: Negative for chest pain and leg swelling. Gastrointestinal: Positive for abdominal distention and blood in stool. Genitourinary: Negative for difficulty urinating, dysuria, frequency and hematuria. Musculoskeletal: Positive for back pain. Skin: Negative for rash. Neurological: Negative for light-headedness and headaches. Psychiatric/Behavioral: The patient is not nervous/anxious. Results for orders placed or performed in visit on 03/23/21   AMB POC URINALYSIS DIP STICK AUTO W/O MICRO     Status: None   Result Value Ref Range Status    Color (UA POC) Yellow  Final    Clarity (UA POC) Clear  Final    Glucose (UA POC) Negative Negative Final    Bilirubin (UA POC) Negative Negative Final    Ketones (UA POC) Negative Negative Final    Specific gravity (UA POC) 1.015 1.001 - 1.035 Final    Blood (UA POC) Negative Negative Final    pH (UA POC) 6.0 4.6 - 8.0 Final    Protein (UA POC) Negative Negative Final    Urobilinogen (UA POC) 0.2 mg/dL 0.2 - 1 Final    Nitrites (UA POC) Negative Negative Final    Leukocyte esterase (UA POC) Negative Negative Final         /74 (BP 1 Location: Left upper arm, BP Patient Position: Sitting, BP Cuff Size: Adult)   Pulse 86   Temp 98.1 °F (36.7 °C) (Oral)   Resp 16   Ht 5' 7\" (1.702 m)   Wt 331 lb (150.1 kg)   LMP 08/20/2000   SpO2 98%   BMI 51.84 kg/m²   Physical Exam  Vitals signs and nursing note reviewed. Constitutional:       Appearance: Normal appearance. She is obese. HENT:      Head: Normocephalic and atraumatic. Neck:      Musculoskeletal: Normal range of motion and neck supple. Cardiovascular:      Rate and Rhythm: Normal rate and regular rhythm. Pulmonary:      Effort: Pulmonary effort is normal.      Breath sounds: Normal breath sounds. Abdominal:      General: Bowel sounds are normal. There is distension. Palpations: Abdomen is soft. Tenderness: There is no abdominal tenderness. There is left CVA tenderness. There is no guarding or rebound. Musculoskeletal:         General: Tenderness (left flank) present. Skin:     General: Skin is warm and dry. Findings: No rash. Neurological:      General: No focal deficit present. Mental Status: She is alert and oriented to person, place, and time.                An electronic signature was used to authenticate this note.   -- Bettina Noguera NP

## 2021-03-24 ENCOUNTER — HOSPITAL ENCOUNTER (OUTPATIENT)
Dept: CT IMAGING | Age: 61
Discharge: HOME | End: 2021-03-24
Attending: NURSE PRACTITIONER
Payer: COMMERCIAL

## 2021-03-24 DIAGNOSIS — R10.9 ACUTE LEFT FLANK PAIN: ICD-10-CM

## 2021-03-24 DIAGNOSIS — K62.5 BRIGHT RED RECTAL BLEEDING: ICD-10-CM

## 2021-03-24 PROCEDURE — 74011000636 HC RX REV CODE- 636

## 2021-03-24 PROCEDURE — 74177 CT ABD & PELVIS W/CONTRAST: CPT

## 2021-03-24 RX ADMIN — IOPAMIDOL 100 ML: 755 INJECTION, SOLUTION INTRAVENOUS at 19:11

## 2021-03-25 ENCOUNTER — TELEPHONE (OUTPATIENT)
Dept: INTERNAL MEDICINE CLINIC | Age: 61
End: 2021-03-25

## 2021-03-25 DIAGNOSIS — K42.9 UMBILICAL HERNIA WITHOUT OBSTRUCTION AND WITHOUT GANGRENE: Primary | ICD-10-CM

## 2021-03-25 NOTE — TELEPHONE ENCOUNTER
I called [facility] and left a message for Kailash Rivera, to call pt to schedule her a visit for evaluation of umbilical hernia 10HK x 4cm. I asked that Kailash Rivera call our office or send a message on when pts appt is. I will let pt know that she will be receiving a call from [facility] to schedule an appt.

## 2021-04-10 LAB — LDL-C, EXTERNAL: 106

## 2021-05-14 RX ORDER — HYDROCHLOROTHIAZIDE 25 MG/1
TABLET ORAL
Qty: 30 TAB | Refills: 5 | Status: SHIPPED | OUTPATIENT
Start: 2021-05-14 | End: 2021-11-04

## 2021-08-12 ENCOUNTER — VIRTUAL VISIT (OUTPATIENT)
Dept: INTERNAL MEDICINE CLINIC | Age: 61
End: 2021-08-12
Payer: COMMERCIAL

## 2021-08-12 DIAGNOSIS — F41.8 ANXIETY ABOUT HEALTH: Primary | ICD-10-CM

## 2021-08-12 DIAGNOSIS — K43.9 ABDOMINAL WALL HERNIA: ICD-10-CM

## 2021-08-12 PROCEDURE — 99213 OFFICE O/P EST LOW 20 MIN: CPT | Performed by: NURSE PRACTITIONER

## 2021-08-12 RX ORDER — SEMAGLUTIDE 1.34 MG/ML
0.5 INJECTION, SOLUTION SUBCUTANEOUS
COMMUNITY
Start: 2021-07-22 | End: 2021-10-01

## 2021-08-12 NOTE — PROGRESS NOTES
Sid Sandifer (: 1960) is a 64 y.o. female, established patient, here for evaluation of the following chief complaint(s): Anxiety (over the surgery - Very scared and not sure if she should acutally go )       ASSESSMENT/PLAN:  Below is the assessment and plan developed based on review of pertinent labs, studies, and medications. 1. Anxiety about health -- discussed upcoming surgery and anesthesia with patient; questions answered and reassurance given. Offered medication to take as needed but patient declined at this time. 2. Abdominal wall hernia -- discussion with patient regarding benefits of surgical repair on an elective basis versus an emergent basis. SUBJECTIVE/OBJECTIVE:  HPI    Visit conducted via [employer] me platform. Presents with concerns and anxiety regarding upcoming abdominal hernia repair with Dr Celine Mckinley currently scheduled for mid September. Reports she developed pneumonia immediately after she had laparoscopic cholecystectomy  in  and has been apprehensive about having any surgery since then. She had pilonidal cystectomy in 2017 with Dr Celine Mckinley and did fine. Asthma has been under control and she has not been needing to use either Symbicort or Albuterol rescue inhaler recently.     Review of Systems     Patient-Reported Vitals 2021   Patient-Reported Weight 333lb   Patient-Reported Height -   Patient-Reported Pulse -   Patient-Reported Temperature -   Patient-Reported Systolic  -   Patient-Reported Diastolic -       Physical Exam    [INSTRUCTIONS:  \"[x]\" Indicates a positive item  \"[]\" Indicates a negative item  -- DELETE ALL ITEMS NOT EXAMINED]    Constitutional: [x] Appears well-developed and well-nourished [x] No apparent distress      [] Abnormal -     Mental status: [x] Alert and awake  [x] Oriented to person/place/time [x] Able to follow commands    [] Abnormal -     Eyes:   EOM    [x]  Normal    [] Abnormal -   Sclera  [x]  Normal    [] Abnormal - Discharge [x]  None visible   [] Abnormal -     HENT: [x] Normocephalic, atraumatic  [] Abnormal -   [x] Mouth/Throat: Mucous membranes are moist    External Ears [x] Normal  [] Abnormal -    Neck: [x] No visualized mass [] Abnormal -     Pulmonary/Chest: [x] Respiratory effort normal   [x] No visualized signs of difficulty breathing or respiratory distress        [] Abnormal -      Musculoskeletal:   [x] Normal gait with no signs of ataxia         [x] Normal range of motion of neck        [] Abnormal -     Neurological:        [x] No Facial Asymmetry (Cranial nerve 7 motor function) (limited exam due to video visit)          [x] No gaze palsy        [] Abnormal -          Skin:        [x] No significant exanthematous lesions or discoloration noted on facial skin         [] Abnormal -            Psychiatric:       [x] Normal Affect [] Abnormal -        [x] No Hallucinations        On this date 08/12/2021 I have spent 20 minutes reviewing previous notes, test results and face to face (virtual) with the patient discussing the diagnosis and importance of compliance with the treatment plan as well as documenting on the day of the visitRobin Lazcano, was evaluated through a synchronous (real-time) audio-video encounter. The patient (or guardian if applicable) is aware that this is a billable service. Verbal consent to proceed has been obtained within the past 12 months. The visit was conducted pursuant to the emergency declaration under the [facility], [address] [place] [address] authority and the [employer] Act. Patient identification was verified, and a caregiver was present when appropriate. The patient was located in a state where the provider was credentialed to provide care. An electronic signature was used to authenticate this note.   -- Sanjana Fitzgerald NP

## 2021-08-25 LAB — HBA1C MFR BLD HPLC: 6 %

## 2021-08-30 ENCOUNTER — PATIENT MESSAGE (OUTPATIENT)
Dept: INTERNAL MEDICINE CLINIC | Age: 61
End: 2021-08-30

## 2021-08-30 ENCOUNTER — VIRTUAL VISIT (OUTPATIENT)
Dept: INTERNAL MEDICINE CLINIC | Age: 61
End: 2021-08-30
Payer: COMMERCIAL

## 2021-08-30 DIAGNOSIS — E66.01 MORBID OBESITY WITH BMI OF 50.0-59.9, ADULT (HCC): ICD-10-CM

## 2021-08-30 DIAGNOSIS — J40 BRONCHITIS: ICD-10-CM

## 2021-08-30 DIAGNOSIS — J45.901 MODERATE ASTHMA WITH EXACERBATION, UNSPECIFIED WHETHER PERSISTENT: Primary | ICD-10-CM

## 2021-08-30 PROCEDURE — 99213 OFFICE O/P EST LOW 20 MIN: CPT | Performed by: INTERNAL MEDICINE

## 2021-08-30 RX ORDER — GUAIFENESIN 600 MG/1
600 TABLET, EXTENDED RELEASE ORAL 2 TIMES DAILY
COMMUNITY
End: 2021-10-01 | Stop reason: ALTCHOICE

## 2021-08-30 RX ORDER — ALBUTEROL SULFATE 90 UG/1
1 AEROSOL, METERED RESPIRATORY (INHALATION)
Qty: 1 INHALER | Refills: 5 | Status: SHIPPED | OUTPATIENT
Start: 2021-08-30 | End: 2022-05-09

## 2021-08-30 RX ORDER — PREDNISONE 20 MG/1
TABLET ORAL
Qty: 18 TABLET | Refills: 0 | Status: SHIPPED | OUTPATIENT
Start: 2021-08-30 | End: 2021-09-08

## 2021-08-30 RX ORDER — CODEINE PHOSPHATE AND GUAIFENESIN 10; 100 MG/5ML; MG/5ML
5 SOLUTION ORAL
Qty: 120 ML | Refills: 0 | Status: SHIPPED | OUTPATIENT
Start: 2021-08-30 | End: 2021-09-29

## 2021-08-30 RX ORDER — CLARITHROMYCIN 500 MG/1
500 TABLET, FILM COATED ORAL 2 TIMES DAILY
Qty: 20 TABLET | Refills: 0 | Status: SHIPPED | OUTPATIENT
Start: 2021-08-30 | End: 2021-10-01 | Stop reason: ALTCHOICE

## 2021-08-30 RX ORDER — BUDESONIDE AND FORMOTEROL FUMARATE DIHYDRATE 160; 4.5 UG/1; UG/1
2 AEROSOL RESPIRATORY (INHALATION) 2 TIMES DAILY
Qty: 1 INHALER | Refills: 5 | Status: SHIPPED | OUTPATIENT
Start: 2021-08-30 | End: 2022-05-09

## 2021-08-30 NOTE — TELEPHONE ENCOUNTER
From: Liliane Boles  To: Charu Shaw NP  Sent: 2021 8:28 AM EDT  Subject: Non-Urgent Medical Question    Good Morning [employer],   I need you. I have Bronchitis. Trying to make an appt but I am on hold - 30 minutes. It started Th with a sore throat, head on congestion on Fri, Chest and cough since Saturday. No Fever. Took a Binax now Covid test on Sat pm and it was negative. I am using my Nebulizer and meds ( ) taking Mucinex. Can I get a virtual appointment or an office appointment? I need a new inhaler ( ), doxycycline, cough syrup and or steriods. I have surgery scheduled on . Please advise. [phone]. Thanks.

## 2021-08-31 NOTE — PROGRESS NOTES
Consent: Jeannette Hutchinson, who was seen by synchronous (real-time) audio-video technology, and/or her healthcare decision maker, is aware that this patient-initiated, Telehealth encounter on 8/30/2021 is a billable service, with coverage as determined by her insurance carrier. She is aware that she may receive a bill and has provided verbal consent to proceed: Yes. 712  Subjective:   Jeannette Hutchinson is a 64 y.o. female who was seen for Cough (Began on Saturday - sore throat, head congestion - OTC COVID test negative - no fever.)      Reports a 4-day history of mild cough, sinus congestion, wheezing, mild shortness of breath. Denies any fever, chills, loss of sense of smell or taste, diarrhea, nausea, vomiting. She went to urgent care and had a Covid test which was -2 days ago. She is using her nebulizer for relief of wheezing. She is scheduled for hernia surgery 9/16/21 w Dr. Pastor Hyman. Current Outpatient Medications   Medication Sig    guaiFENesin ER (Mucinex) 600 mg ER tablet Take 600 mg by mouth two (2) times a day.  predniSONE (DELTASONE) 20 mg tablet Take 3 pills for 3 days, then 2 pills for 3 days, then 1 pill for 3 days    guaiFENesin-codeine (ROBITUSSIN AC) 100-10 mg/5 mL solution Take 5 mL by mouth three (3) times daily as needed for Cough for up to 30 days. Max Daily Amount: 15 mL.  clarithromycin (BIAXIN) 500 mg tablet Take 1 Tablet by mouth two (2) times a day.  budesonide-formoteroL (SYMBICORT) 160-4.5 mcg/actuation HFAA Take 2 Puffs by inhalation two (2) times a day.  albuterol (PROVENTIL HFA, VENTOLIN HFA, PROAIR HFA) 90 mcg/actuation inhaler Take 1 Puff by inhalation every six (6) hours as needed for Wheezing.  semaglutide (Ozempic) 0.25 mg or 0.5 mg/dose (2 mg/1.5 ml) subq pen 0.5 mg.    hydroCHLOROthiazide (HYDRODIURIL) 25 mg tablet TAKE 1 TABLET BY MOUTH EVERY DAY    levothyroxine (Synthroid) 175 mcg tablet Take 175 mcg by mouth Daily (before breakfast).     phentermine (ADIPEX-P) 37.5 mg tablet Take 37.5 mg by mouth every morning. Taking 1/2 tab daily    estradioL (ESTRACE) 1 mg tablet Take 1 Tab by mouth daily.  albuterol (PROVENTIL VENTOLIN) 2.5 mg /3 mL (0.083 %) nebu 3 mL by Nebulization route every four (4) hours as needed for Wheezing.  olmesartan (BENICAR) 40 mg tablet olmesartan 40 mg tablet    magnesium oxide (MAG-OX) 400 mg tablet Take 400 mg by mouth daily.  ZINC PO Take 1 Tab by mouth daily.  ASCORBATE CALCIUM (VITAMIN C PO) Take 1 Tab by mouth daily.  cholecalciferol, vitamin D3, (VITAMIN D3) 4,000 unit cap Take 1 Tab by mouth daily.  VITAMIN B COMPLEX PO Take 1 Tab by mouth daily. No current facility-administered medications for this visit. Allergies   Allergen Reactions    Effexor [Venlafaxine] Other (comments)     Htn,nose bleeding,increase in anxiety    Gluten Other (comments)     Irritable bowel symptoms, bloating     Levaquin [Levofloxacin] Myalgia     Per pt muscle weakness    Lisinopril Diarrhea    Metformin Diarrhea    Other Medication Swelling     Family of grains    Tramadol Other (comments)     Caused hair loss       Past Medical History:   Diagnosis Date    AR (allergic rhinitis)     Dr. Hallie Kathleen BCC (basal cell carcinoma of skin)     BCC skin cancer right ankle    Biliary dyskinesia     s/p azeb    Depression     Endometriosis     L[facility]AN[facility] 10/2008 Dr. Judd Gale Environmental allergies 10/2010    oats, barley, cotton seed. Dr. Alexei Gage    Gluten intolerance     HTN (hypertension)     Hyperlipidemia LDL goal < 130     Hypothyroidism     Dr. Lilibeth Wild Impaired fasting glucose     105 7/2008    Menopausal disorder     MR (mitral regurgitation)     mild    FRANSISCA (obstructive sleep apnea) 11/11/2018    mild.   [facility]    Sebaceous cyst of breast     Dr. Rosa M Simeon    Skin abnormalities 8/2010    pre-cancerous lesions of face, Dr. Felix Perea D deficiency     VSD (ventricular septal defect)     self-closed age 11, TTE normal 3/09       ROS  All other systems reviewed and negative, unless mentioned in HPI    Objective:   Vital Signs: (As obtained by patient/caregiver at home)  Visit Vitals  LMP 08/20/2000        [INSTRUCTIONS:  \"[x]\" Indicates a positive item  \"[]\" Indicates a negative item  -- DELETE ALL ITEMS NOT EXAMINED]    Constitutional: [x] Appears well-developed and well-nourished [x] No apparent distress      [] Abnormal -     Mental status: [x] Alert and awake  [x] Oriented to person/place/time [x] Able to follow commands    [] Abnormal -     Eyes:   EOM    [x]  Normal    [] Abnormal -   Sclera  [x]  Normal    [] Abnormal -          Discharge [x]  None visible   [] Abnormal -     HENT: [x] Normocephalic, atraumatic  [] Abnormal -   [x] Mouth/Throat: Mucous membranes are moist    External Ears [x] Normal  [] Abnormal -    Neck: [x] No visualized mass [] Abnormal -     Pulmonary/Chest: [x] Respiratory effort normal   [x] No visualized signs of difficulty breathing or respiratory distress        [] Abnormal -      Musculoskeletal:   [x] Normal gait with no signs of ataxia         [x] Normal range of motion of neck        [] Abnormal -     Neurological:        [x] No Facial Asymmetry (Cranial nerve 7 motor function) (limited exam due to video visit)          [x] No gaze palsy        [] Abnormal -          Skin:        [x] No significant exanthematous lesions or discoloration noted on facial skin         [] Abnormal -            Psychiatric:       [x] Normal Affect [] Abnormal -        [x] No Hallucinations    Other pertinent observable physical exam findings:-        Assessment & Plan:   Diagnoses and all orders for this visit:    1. Moderate asthma with exacerbation, unspecified whether persistent  Upper respiratory infection which may be viral, but she does have a tendency to have pretty significant exacerbations of asthma and bronchitis. Will treat as such. Covid negative.   No other overt symptoms of suggest pneumonia. -     predniSONE (DELTASONE) 20 mg tablet; Take 3 pills for 3 days, then 2 pills for 3 days, then 1 pill for 3 days  -     guaiFENesin-codeine (ROBITUSSIN AC) 100-10 mg/5 mL solution; Take 5 mL by mouth three (3) times daily as needed for Cough for up to 30 days. Max Daily Amount: 15 mL. -     clarithromycin (BIAXIN) 500 mg tablet; Take 1 Tablet by mouth two (2) times a day. -     budesonide-formoteroL (SYMBICORT) 160-4.5 mcg/actuation HFAA; Take 2 Puffs by inhalation two (2) times a day. -     albuterol (PROVENTIL HFA, VENTOLIN HFA, PROAIR HFA) 90 mcg/actuation inhaler; Take 1 Puff by inhalation every six (6) hours as needed for Wheezing. 2. Bronchitis  -     clarithromycin (BIAXIN) 500 mg tablet; Take 1 Tablet by mouth two (2) times a day. 3. Morbid obesity with BMI of 50.0-59.9, adult [facility])  The patient is asked to make an attempt to improve diet and exercise patterns to aid in medical management of this problem. Seeing endocrinology. Taking Ozempic now          We discussed the expected course, resolution and complications of the diagnosis(es) in detail. Medication risks, benefits, costs, interactions, and alternatives were discussed as indicated. I advised her to contact the office if her condition worsens, changes or fails to improve as anticipated. She expressed understanding with the diagnosis(es) and plan. Toby Lomax is a 64 y.o. female who was evaluated by an audio-video encounter for concerns as above. Patient identification was verified prior to start of the visit. A caregiver was present when appropriate. Due to this being a TeleHealth encounter (During [facility] public health emergency), evaluation of the following organ systems was limited: Vitals/Constitutional/EENT/Resp/CV/GI//MS/Neuro/Skin/Heme-Lymph-Imm.   Pursuant to the emergency declaration under the [facility] [place], [address] waiver authority and the [place] [employer] and Response Supplemental Appropriations Act, this Virtual Visit was conducted, with patient's (and/or legal guardian's) consent, to reduce the patient's risk of exposure to COVID-19 and provide necessary medical care. Services were provided through a synchronous discussion virtually to substitute for in-person clinic visit. I was in the office. The patient was at home.      Cecil Tinajero MD

## 2021-09-14 LAB — SARS-COV-2, NAA: NEGATIVE

## 2021-10-01 ENCOUNTER — OFFICE VISIT (OUTPATIENT)
Dept: INTERNAL MEDICINE CLINIC | Age: 61
End: 2021-10-01
Payer: COMMERCIAL

## 2021-10-01 VITALS
BODY MASS INDEX: 45.99 KG/M2 | WEIGHT: 293 LBS | OXYGEN SATURATION: 99 % | DIASTOLIC BLOOD PRESSURE: 68 MMHG | RESPIRATION RATE: 14 BRPM | HEIGHT: 67 IN | SYSTOLIC BLOOD PRESSURE: 121 MMHG | HEART RATE: 98 BPM | TEMPERATURE: 98.5 F

## 2021-10-01 DIAGNOSIS — Z23 ENCOUNTER FOR IMMUNIZATION: ICD-10-CM

## 2021-10-01 DIAGNOSIS — E66.01 OBESITY, MORBID (HCC): ICD-10-CM

## 2021-10-01 DIAGNOSIS — Z23 NEEDS FLU SHOT: ICD-10-CM

## 2021-10-01 DIAGNOSIS — Z00.00 PREVENTATIVE HEALTH CARE: Primary | ICD-10-CM

## 2021-10-01 DIAGNOSIS — N60.81 SEBACEOUS CYST OF BREAST, RIGHT: ICD-10-CM

## 2021-10-01 DIAGNOSIS — E03.9 ACQUIRED HYPOTHYROIDISM: ICD-10-CM

## 2021-10-01 DIAGNOSIS — E78.5 HYPERLIPIDEMIA WITH TARGET LDL LESS THAN 130: ICD-10-CM

## 2021-10-01 DIAGNOSIS — R73.01 IMPAIRED FASTING GLUCOSE: ICD-10-CM

## 2021-10-01 PROCEDURE — 99396 PREV VISIT EST AGE 40-64: CPT | Performed by: INTERNAL MEDICINE

## 2021-10-01 PROCEDURE — 90715 TDAP VACCINE 7 YRS/> IM: CPT | Performed by: INTERNAL MEDICINE

## 2021-10-01 PROCEDURE — 90471 IMMUNIZATION ADMIN: CPT | Performed by: INTERNAL MEDICINE

## 2021-10-01 PROCEDURE — 90686 IIV4 VACC NO PRSV 0.5 ML IM: CPT | Performed by: INTERNAL MEDICINE

## 2021-10-01 PROCEDURE — 90472 IMMUNIZATION ADMIN EACH ADD: CPT | Performed by: INTERNAL MEDICINE

## 2021-10-01 RX ORDER — LEVOTHYROXINE SODIUM 150 UG/1
150 TABLET ORAL
Qty: 10 TABLET | Refills: 5
Start: 2021-10-01 | End: 2022-10-28 | Stop reason: ALTCHOICE

## 2021-10-01 RX ORDER — SEMAGLUTIDE 1.34 MG/ML
0.5 INJECTION, SOLUTION SUBCUTANEOUS
Qty: 1 BOX | Refills: 3
Start: 2021-10-01 | End: 2022-10-28 | Stop reason: ALTCHOICE

## 2021-10-01 RX ORDER — LEVOTHYROXINE SODIUM 175 UG/1
TABLET ORAL
Qty: 30 TABLET | Refills: 4
Start: 2021-10-01 | End: 2022-10-28 | Stop reason: ALTCHOICE

## 2021-10-01 RX ORDER — LANOLIN ALCOHOL/MO/W.PET/CERES
CREAM (GRAM) TOPICAL
COMMUNITY

## 2021-10-01 RX ORDER — FUROSEMIDE 20 MG/1
TABLET ORAL AS NEEDED
COMMUNITY
Start: 2020-11-11

## 2021-10-01 RX ORDER — GLUCOSAMINE/CHONDR SU A SOD 750-600 MG
TABLET ORAL
COMMUNITY

## 2021-10-03 NOTE — PROGRESS NOTES
Ryan Romero is a 64 y.o. female  Presenting for her annual checkup and follow-up    Doing fairly well. Working for [employer]. Followed by Dr. Georgina Lizarraga in endocrinology for metabolic syndrome, hyperipidemia, hypothyroidism. Labs done August 24, 2021. A1c 6%, free T4 1.96, TSH 7.553, basic metabolic panel showed normal renal function and potassium normal 4.2. CBC showed no abnormalities. Was recommended to decrease her weekly dose of Synthroid. Monitored for obesity. She is tolerating Ozempic and phentermine. Has had difficulty losing weight, but feels the medications do help her not gain more. Wt Readings from Last 3 Encounters:   10/01/21 330 lb 6.4 oz (149.9 kg)   03/23/21 331 lb (150.1 kg)   03/09/21 334 lb (151.5 kg)       Has noted a nodule under her right breast over the past 2 days. It is draining some clear to serous liquid at times. She does have a family history of breast cancer    Last breast exam: Per GYN March 9, 2021  Last PAP/pelvic: Exam March 2021. History of hysterectomy.     Health Maintenance   Topic Date Due    Shingrix Vaccine Age 49> (1 of 2) Never done    A1C test (Diabetic or Prediabetic)  08/25/2022    Breast Cancer Screen Mammogram  03/09/2023    Colorectal Cancer Screening Combo  07/29/2025    Lipid Screen  04/10/2026    DTaP/Tdap/Td series (3 - Td or Tdap) 10/01/2031    Hepatitis C Screening  Completed    Flu Vaccine  Completed    COVID-19 Vaccine  Completed    Pneumococcal 0-64 years  Aged Out       Exercise: moderately active  Diet: generally follows a low fat low cholesterol diet    Vaccinations reviewed  Immunization History   Administered Date(s) Administered    COVID-19, PFIZER, MRNA, LNP-S, PF, 30MCG/0.3ML DOSE 03/29/2021, 04/27/2021    Influenza Vaccine 08/24/2016, 08/01/2017, 10/18/2018, 09/04/2020    Influenza Vaccine (Quad) PF (>6 Mo Flulaval, Fluarix, and >3 Yrs Afluria, Fluzone [id number]) 10/18/2018, 10/04/2019, 10/01/2021    Influenza Vaccine Whole 09/21/2010    Pneumococcal Polysaccharide (PPSV-23) 11/01/2018    TD Vaccine 03/05/2006    TDAP Vaccine 08/31/2011    Tdap 10/01/2021       Allergies: Effexor [venlafaxine], Gluten, Levaquin [levofloxacin], Lisinopril, Metformin, Other medication, and Tramadol  Current Outpatient Medications   Medication Sig    furosemide (LASIX) 20 mg tablet as needed.  ferrous sulfate (Iron) 325 mg (65 mg iron) tablet Take  by mouth Daily (before breakfast).  Biotin 2,500 mcg cap Take  by mouth.  Ozempic 0.25 mg or 0.5 mg/dose (2 mg/1.5 ml) subq pen 0.5 mg by SubCUTAneous route every seven (7) days.  levothyroxine (Synthroid) 175 mcg tablet Take 1 pill daily 5 days per  week    levothyroxine (SYNTHROID) 150 mcg tablet Take 1 Tablet by mouth Daily (before breakfast). Take 1 pill twice per week    budesonide-formoteroL (SYMBICORT) 160-4.5 mcg/actuation HFAA Take 2 Puffs by inhalation two (2) times a day.  albuterol (PROVENTIL HFA, VENTOLIN HFA, PROAIR HFA) 90 mcg/actuation inhaler Take 1 Puff by inhalation every six (6) hours as needed for Wheezing.  hydroCHLOROthiazide (HYDRODIURIL) 25 mg tablet TAKE 1 TABLET BY MOUTH EVERY DAY    phentermine (ADIPEX-P) 37.5 mg tablet Take 37.5 mg by mouth every morning. Taking 1/2 tab daily    albuterol (PROVENTIL VENTOLIN) 2.5 mg /3 mL (0.083 %) nebu 3 mL by Nebulization route every four (4) hours as needed for Wheezing.  olmesartan (BENICAR) 40 mg tablet olmesartan 40 mg tablet    magnesium oxide (MAG-OX) 400 mg tablet Take 400 mg by mouth daily.  ZINC PO Take 1 Tab by mouth daily.  ASCORBATE CALCIUM (VITAMIN C PO) Take 1 Tab by mouth daily.  cholecalciferol, vitamin D3, (VITAMIN D3) 4,000 unit cap Take 1 Tab by mouth daily.  VITAMIN B COMPLEX PO Take 1 Tab by mouth daily. No current facility-administered medications for this visit.       has a past medical history of AR (allergic rhinitis), BCC (basal cell carcinoma of skin), Biliary dyskinesia, Depression, Endometriosis, Environmental allergies (10/2010), Gluten intolerance, HTN (hypertension), Hyperlipidemia LDL goal < 130, Hypothyroidism, Impaired fasting glucose, Menopausal disorder, MR (mitral regurgitation), FRANSISCA (obstructive sleep apnea) (11/11/2018), Sebaceous cyst of breast, Skin abnormalities (8/2010), Vitamin D deficiency, and VSD (ventricular septal defect). Past Surgical History:   Procedure Laterality Date    COLONOSCOPY  9/2009    Dr. Umer Albright    HX COLONOSCOPY  07/29/2015    normal.  Dr. Justin Najera    [address]    HX CYST REMOVAL  06/2017    Dr. Phuc Lai HX ENDOSCOPY  07/29/2015    gastritis    HX HEART CATHETERIZATION      age 5, age 11 due to VSD    HX HERNIA REPA[facility]  09/16/2021    [facility]    HX HYSTERECTOMY      supracervical    HX LAP CHOLECYSTECTOMY  8/01/2014    Dr. Alea Medina/Kun and adhesions    HX SALPINGO-OOPHORECTOMY      HX TONSILLECTOMY      [place] FEMUR/KNEE SURG UNLISTED  12/30/2010    left, had lipoma removed. Dr. Frank Few History     Socioeconomic History    Marital status: SINGLE     Spouse name: Not on file    Number of children: 0    Years of education: Not on file    Highest education level: Not on file   Occupational History    Not on file   Tobacco Use    Smoking status: Never Smoker    Smokeless tobacco: Never Used   Substance and Sexual Activity    Alcohol use: No    Drug use: No    Sexual activity: Not Currently   Other Topics Concern    Not on file   Social History Narrative    Not on file     Social Determinants of Health     Financial Resource Strain:     Difficulty of Paying Living Expenses:    Food Insecurity:     Worried About Running Out of Food in the Last Year:     [address] in the Last Year:    Transportation Needs:     Lack of Transportation (Medical):      Lack of Transportation (Non-Medical):    Physical Activity:     Days of Exercise per Week:     Minutes of Exercise per Session:    Stress:     Feeling of Stress :    Social Connections:     Frequency of Communication with Friends and Family:     Frequency of Social Gatherings with Friends and Family:     Attends Sikh Services:     Active Member of Clubs or Organizations:     Attends Club or Organization Meetings:     Marital Status:    Intimate Partner Violence:     Fear of Current or Ex-Partner:     Emotionally Abused:     Physically Abused:     Sexually Abused:      Family History   Problem Relation Age of Onset    Cancer Mother         breast age 48    Stroke Mother     Heart Disease Father         CABGx4    High Cholesterol Father     Deep Vein Thrombosis Father         left arm    Diabetes Maternal Grandmother     Heart Disease Paternal Grandfather     High Cholesterol Paternal Grandfather     Heart Attack Paternal Grandfather     Arthritis-rheumatoid Paternal Grandmother     Thyroid Disease Paternal Grandmother     Osteoporosis Maternal Aunt        Review of Systems - History obtained from the patient  General ROS: negative for - night sweats, weight gain or weight loss  Cardiovascular ROS: no chest pain, dyspnea on exertion, edema  GYN ROS: normal menses, no abnormal bleeding, pelvic pain or discharge, no breast pain or new or enlarging lumps on self exam.    Physical exam  Blood pressure 121/68, pulse 98, temperature 98.5 °F (36.9 °C), temperature source Oral, resp. rate 14, height 5' 7\" (1.702 m), weight 330 lb 6.4 oz (149.9 kg), last menstrual period 08/20/2000, SpO2 99 %. Wt Readings from Last 3 Encounters:   10/01/21 330 lb 6.4 oz (149.9 kg)   03/23/21 331 lb (150.1 kg)   03/09/21 334 lb (151.5 kg)     she appears well, alert and oriented x 3, pleasant and cooperative. Vitals as noted. No rashes or significant lesions. Neck supple and free of adenopathy, or masses. No thyromegaly or carotid bruits. Cranial nerves normal. Lungs are clear to auscultation.  Heart sounds are normal with no murmurs, clicks, gallops or rubs. Abdomen is soft, non- tender, with no masses or organomegaly. Extremities, peripheral pulses and reflexes are normal.  . BREAST EXAM complete breast exam was not performed, she had a 1 cm nodule intertriginous under right breast.      Diagnoses and all orders for this visit:    1. Preventative health care  She is up-to-date. Tdap and flu vaccines given today. Recommend Shingrix vaccine in some form. 2. Encounter for immunization  -     TETANUS, DIPHTHERIA TOXOIDS AND ACELLULAR PERTUSSIS VACCINE (TDAP), IN INDIVIDS. >=7, IM  -     CT IMMUNIZ ADMIN,1 SINGLE/COMB VAC/TOXOID    3. Needs flu shot  -     INFLUENZA VIRUS VAC QUAD,SPLIT,PRESV FREE SYRINGE IM    4. Acquired hypothyroidism  Managed by endocrinology. Recent decrease in dose because TSH and free T4 borderline overactive. -     levothyroxine (Synthroid) 175 mcg tablet; Take 1 pill daily 5 days per  week  -     levothyroxine (SYNTHROID) 150 mcg tablet; Take 1 Tablet by mouth Daily (before breakfast). Take 1 pill twice per week    5. Sebaceous cyst of breast, right  I am optimistic that this is a benign dermatologic finding and not an inflammatory breast cancer. She does see Dr. Marcelo Herrera next week for follow-up and evaluation. 6. Obesity, morbid ([address]  Managed by endocrinology. Encouraged her to continue with Ozempic, phentermine and of course very low carb diet and as much exercise as tolerable. 7. Hyperlipidemia with target LDL less than 130  This condition is controlled on current medication regimen as written in medication list.  Medications refilled. 8. Impaired fasting glucose  The patient is asked to make an attempt to improve diet and exercise patterns to aid in medical management of this problem.     The patient is asked to make an attempt to improve diet and exercise patterns    Return for yearly wellness visits

## 2021-10-08 ENCOUNTER — HOSPITAL ENCOUNTER (OUTPATIENT)
Dept: MAMMOGRAPHY | Age: 61
Discharge: HOME | End: 2021-10-08
Attending: SURGERY
Payer: COMMERCIAL

## 2021-10-08 ENCOUNTER — OFFICE VISIT (OUTPATIENT)
Dept: SURGERY | Age: 61
End: 2021-10-08
Payer: COMMERCIAL

## 2021-10-08 VITALS — WEIGHT: 293 LBS | BODY MASS INDEX: 45.99 KG/M2 | HEIGHT: 67 IN

## 2021-10-08 DIAGNOSIS — N63.10 BREAST MASS, RIGHT: ICD-10-CM

## 2021-10-08 DIAGNOSIS — Z91.89 AT HIGH RISK FOR BREAST CANCER: Primary | ICD-10-CM

## 2021-10-08 DIAGNOSIS — N60.81 SEBACEOUS CYST OF BREAST, RIGHT: ICD-10-CM

## 2021-10-08 DIAGNOSIS — Z80.3 FAMILY HISTORY OF BREAST CANCER: ICD-10-CM

## 2021-10-08 PROCEDURE — 76642 ULTRASOUND BREAST LIMITED: CPT | Performed by: SURGERY

## 2021-10-08 PROCEDURE — 76642 ULTRASOUND BREAST LIMITED: CPT

## 2021-10-08 PROCEDURE — 99203 OFFICE O/P NEW LOW 30 MIN: CPT | Performed by: SURGERY

## 2021-10-08 PROCEDURE — 77061 BREAST TOMOSYNTHESIS UNI: CPT

## 2021-10-08 RX ORDER — DOXYCYCLINE 100 MG/1
100 TABLET ORAL 2 TIMES DAILY
Qty: 20 TABLET | Refills: 0 | Status: SHIPPED | OUTPATIENT
Start: 2021-10-08 | End: 2021-10-18

## 2021-10-08 NOTE — PROGRESS NOTES
HISTORY OF PRESENT ILLNESS  Jf Guerrero is a 64 y.o. female. HPI NEW patient here for RIGHT breast draining mass. She describes this as a \"cyst.\" History of sebaceous cyst RIGHT breast which she has had treated by Dr. Garth Cannon. The patient is nervous. Family history-  Mother diagnosed with metastatic breast cancer at age 48 and  a few months after diagnosis. Paternal cousin diagnosed with breast cancer in her 52's. Breast imaging-  [facility] Results (most recent):  Results from Hospital Encounter encounter on 21    [facility] 3D JANI W MAMMO BI SCREENING INCL CAD    Narrative  STUDY: Bilateral digital screening mammogram with 3-D tomosynthesis    INDICATION:  Screening. COMPARISON: Most recent     BREAST COMPOSITION: There are scattered areas of fibroglandular density. FINDINGS: Bilateral digital screening mammography was performed and is  interpreted in conjunction with a computer assisted detection (CAD) system. Additionally, tomosynthesis of both breasts in the CC and MLO projections was  performed. No suspicious masses or calcifications are identified. There has been  no significant change. Impression  BI-RADS 1: Negative. No mammographic evidence of malignancy. RECOMMENDATIONS:  Next screening mammogram is recommended in one year. The patient will be notified of these results. Review of Systems   Constitutional: Negative. HENT: Positive for congestion. Eyes: Negative. Respiratory: Negative. Cardiovascular: Negative. Gastrointestinal: Positive for abdominal pain. Genitourinary: Negative. Musculoskeletal: Positive for joint pain. Skin: Negative. Neurological: Negative. Endo/Heme/Allergies: Negative. Psychiatric/Behavioral: Negative.         Physical Exam    ASSESSMENT and PLAN  {ASSESSMENT/PLAN:[id number]}

## 2021-10-08 NOTE — LETTER
10/13/2021 2:16 PM    Patient:  Jyotsna Sutton   YOB: 1960  Date of Visit: 10/8/2021      Dear Dr. Meeks Mini: Thank you for referring Ms. Jyotsna Sutton to me for evaluation/treatment. Below are the relevant portions of my assessment and plan of care. If you have questions, please do not hesitate to call me. I look forward to following Ms. Sam along with you.         Sincerely,      Kathrin Crenshaw MD

## 2021-10-08 NOTE — PATIENT INSTRUCTIONS

## 2021-10-08 NOTE — PROGRESS NOTES
HISTORY OF PRESENT ILLNESS  Balbina Whitten is a 64 y.o. female. HPI  NEW patient here for RIGHT breast draining mass. She describes this as a \"cyst.\" History of sebaceous cyst RIGHT breast which she has had treated by Dr. Brisa Miranda. The patient is nervous.     Family history-  Mother diagnosed with metastatic breast cancer at age 48 and  a few months after diagnosis. Paternal cousin diagnosed with breast cancer in her 52's.     Breast imaging-  [facility] Results (most recent):  Results from Hospital Encounter encounter on 21     [facility] 3D JANI W MAMMO BI SCREENING INCL CAD     Narrative  STUDY: Bilateral digital screening mammogram with 3-D tomosynthesis     INDICATION:  Screening.     COMPARISON: Most recent      BREAST COMPOSITION: There are scattered areas of fibroglandular density.     FINDINGS: Bilateral digital screening mammography was performed and is  interpreted in conjunction with a computer assisted detection (CAD) system. Additionally, tomosynthesis of both breasts in the CC and MLO projections was  performed. No suspicious masses or calcifications are identified. There has been  no significant change.     Impression  BI-RADS 1: Negative. No mammographic evidence of malignancy.     RECOMMENDATIONS:  Next screening mammogram is recommended in one year.     The patient will be notified of these results. Past Medical History:   Diagnosis Date    AR (allergic rhinitis)     Dr. Antonella Salmon (basal cell carcinoma of skin)     Dr. Saintclair Nares. BCC skin cancer right ankle    Biliary dyskinesia     s/p azeb    Depression     Endometriosis     LASHANDA 10/2008 Dr. Nataliya Brooks Environmental allergies 10/2010    oats, barley, cotton seed. Dr. Temo Domínguez FH: breast cancer in first degree relative     Dr. Cynthia Oliver. mother dx breast cancer age 48.     Gluten intolerance     HTN (hypertension)     DR. Eduarda Mcconnell    Hyperlipidemia LDL goal < 130     Hypothyroidism     Dr. Ronni Mayes Impaired fasting glucose Dr. Shashi Young Menopausal disorder     MR (mitral regurgitation)     mild    FRANSISCA (obstructive sleep apnea) 11/11/2018    mild. [facility]    Sebaceous cyst of breast     Dr. Bebeto Romero    Skin abnormalities 8/2010    pre-cancerous lesions of face, Dr. Farhana Bird D deficiency     VSD (ventricular septal defect)     self-closed age 11, TTE normal 3/09       Past Surgical History:   Procedure Laterality Date    COLONOSCOPY  9/2009    Dr. José Miguel Mandel    HX COLONOSCOPY  07/29/2015    normal.  Dr. Reinaldo Vieira    [address]    HX CYST REMOVAL  06/2017    Dr. Ben Avalos HX ENDOSCOPY  07/29/2015    gastritis    HX HEART CATHETERIZATION      age 5, age 11 due to VSD    HX HERNIA REPAIR  09/16/2021    [facility]. [facility]le [place] [facility]    HX HYSTERECTOMY      supracervical    HX LAP CHOLECYSTECTOMY  8/01/2014    Dr. Angy Medina/Kun and adhesions    HX SALPINGO-OOPHORECTOMY      HX TONSILLECTOMY      [place] FEMUR/KNEE SURG UNLISTED  12/30/2010    left, had lipoma removed. Dr. Cosby Black History     Socioeconomic History    Marital status: SINGLE     Spouse name: Not on file    Number of children: 0    Years of education: Not on file    Highest education level: Not on file   Occupational History    Not on file   Tobacco Use    Smoking status: Never Smoker    Smokeless tobacco: Never Used   Substance and Sexual Activity    Alcohol use: No    Drug use: No    Sexual activity: Not Currently   Other Topics Concern    Not on file   Social History Narrative    Not on file     Social Determinants of Health     Financial Resource Strain:     Difficulty of Paying Living Expenses:    Food Insecurity:     Worried About Running Out of Food in the Last Year:     [address] in the Last Year:    Transportation Needs:     Lack of Transportation (Medical):      Lack of Transportation (Non-Medical):    Physical Activity:     Days of Exercise per Week:     Minutes of Exercise per Session: Stress:     Feeling of Stress :    Social Connections:     Frequency of Communication with Friends and Family:     Frequency of Social Gatherings with Friends and Family:     Attends Yazdanism Services:     Active Member of Clubs or Organizations:     Attends Club or Organization Meetings:     Marital Status:    Intimate Partner Violence:     Fear of Current or Ex-Partner:     Emotionally Abused:     Physically Abused:     Sexually Abused:        Current Outpatient Medications on File Prior to Visit   Medication Sig Dispense Refill    furosemide (LASIX) 20 mg tablet as needed.  ferrous sulfate (Iron) 325 mg (65 mg iron) tablet Take  by mouth Daily (before breakfast).  Biotin 2,500 mcg cap Take  by mouth.  levothyroxine (Synthroid) 175 mcg tablet Take 1 pill daily 5 days per  week 30 Tablet 4    levothyroxine (SYNTHROID) 150 mcg tablet Take 1 Tablet by mouth Daily (before breakfast). Take 1 pill twice per week 10 Tablet 5    budesonide-formoteroL (SYMBICORT) 160-4.5 mcg/actuation HFAA Take 2 Puffs by inhalation two (2) times a day. 1 Inhaler 5    albuterol (PROVENTIL HFA, VENTOLIN HFA, PROAIR HFA) 90 mcg/actuation inhaler Take 1 Puff by inhalation every six (6) hours as needed for Wheezing. 1 Inhaler 5    hydroCHLOROthiazide (HYDRODIURIL) 25 mg tablet TAKE 1 TABLET BY MOUTH EVERY DAY 30 Tab 5    phentermine (ADIPEX-P) 37.5 mg tablet Take 37.5 mg by mouth every morning. Taking 1/2 tab daily      albuterol (PROVENTIL VENTOLIN) 2.5 mg /3 mL (0.083 %) nebu 3 mL by Nebulization route every four (4) hours as needed for Wheezing. 30 Nebule 2    olmesartan (BENICAR) 40 mg tablet olmesartan 40 mg tablet      magnesium oxide (MAG-OX) 400 mg tablet Take 400 mg by mouth daily.  ZINC PO Take 1 Tab by mouth daily.  ASCORBATE CALCIUM (VITAMIN C PO) Take 1 Tab by mouth daily.  cholecalciferol, vitamin D3, (VITAMIN D3) 4,000 unit cap Take 1 Tab by mouth daily.       VITAMIN B COMPLEX PO Take 1 Tab by mouth daily.  Ozempic 0.25 mg or 0.5 mg/dose (2 mg/1.5 ml) subq pen 0.5 mg by SubCUTAneous route every seven (7) days. (Patient not taking: Reported on 10/8/2021) 1 [address] 3     No current facility-administered medications on file prior to visit. Allergies   Allergen Reactions    Effexor [Venlafaxine] Other (comments)     Htn,nose bleeding,increase in anxiety    Gluten Other (comments)     Irritable bowel symptoms, bloating     Levaquin [Levofloxacin] Myalgia     Per pt muscle weakness    Lisinopril Diarrhea    Metformin Diarrhea    Other Medication Swelling     Family of grains    Tramadol Other (comments)     Caused hair loss       OB History             Para        Term                AB        Living           SAB        TAB        Ectopic        Molar        Multiple        Live Births   0          Obstetric Comments   Menarche:  17. LMP: . # of Children:  0. Age at Delivery of First Child:  N/A. Hysterectomy/oophorectomy:  YES/YES. Breast Bx:  Yes, RIGHT. Hx of Breast Feeding:  N/A.  BCP:  Yes, in the past. Hormone therapy:  Yes. ROS  Constitutional: Negative. HENT: Positive for congestion. Eyes: Negative. Respiratory: Negative. Cardiovascular: Negative. Gastrointestinal: Positive for abdominal pain. Genitourinary: Negative. Musculoskeletal: Positive for joint pain. Skin: Negative. Neurological: Negative. Endo/Heme/Allergies: Negative. Psychiatric/Behavioral: Negative. Physical Exam  Exam conducted with a chaperone present. Cardiovascular:      Rate and Rhythm: Normal rate and regular rhythm. Heart sounds: Normal heart sounds. Pulmonary:      Breath sounds: Normal breath sounds. Chest:      Breasts: Breasts are symmetrical.         Right: Normal. No swelling, bleeding, inverted nipple, mass, nipple discharge, skin change or tenderness.          Left: Normal. No swelling, bleeding, inverted nipple, mass, nipple discharge, skin change or tenderness. Lymphadenopathy:      Cervical:      Right cervical: No superficial, deep or posterior cervical adenopathy. Left cervical: No superficial, deep or posterior cervical adenopathy. Upper Body:      Right upper body: No supraclavicular or axillary adenopathy. Left upper body: No supraclavicular or axillary adenopathy. BREAST ULTRASOUND  Indication: RIGHT breast nodularity 12:00, retroareolar  Technique: The area was scanned using a high-frequency linear-array near-field transducer  Findings: No abnormal mass, lesion, or shadowing noted; no cysts; no axillary lymphadenopathy  Impression: Normal breast tissue  Disposition: No worrisome finding on ultrasound      ASSESSMENT and PLAN    ICD-10-CM ICD-9-CM    1. At high risk for breast cancer  Z91.89 V49.89 MRI BREAST BI W WO CONT   2. Family history of breast cancer  Z80.3 V16.3 MRI BREAST BI W WO CONT   3. Sebaceous cyst of breast, right  N60.81 610.8    4. Breast mass, right  N63.10 611.72 [facility] 3D JANI W MAMMO RT DX INCL CAD      US BREAST RT LIMITED=<3 QUAD      New patient presents for evaluation of RIGHT breast draining mass, and is doing well overall. Minimally inflamed sebaceous cyst on exam at RIGHT breast 5:00, and nodularity at RIGHT breast 12:00 retroareolar. Normal RIGHT breast US. Will send for RIGHT diagnostic mammogram for further evaluation. Prescription sent for doxycycline for sebaceous cyst.    Using the Tyrer-Cuzick model, calculated pt's lifetime risk at 20.5% for breast cancer. Pt qualifies for enrollment in our high risk clinic that includes annual screening breast imaging and follow-up appointments with our nurse practitioner. Will order breast MRI for high risk. F/U in 1 year with Zahira Willams NP. This plan was reviewed with the patient and patient agrees. All questions were answered. Total time spent was 40 minutes.     Written by Calvin Alfaro, [address], as dictated by Dr. Anyi Izaguirre MD.

## 2021-10-27 ENCOUNTER — PATIENT MESSAGE (OUTPATIENT)
Dept: INTERNAL MEDICINE CLINIC | Age: 61
End: 2021-10-27

## 2021-10-28 NOTE — TELEPHONE ENCOUNTER
From: Catracho Gutierres  To: Lamonte Vera MD  Sent: 10/27/2021 8:47 PM EDT  Subject: Non-Urgent Medical Question    Dr. Mayuri Kahn,   Hernia complication. I have a lump larger than a golf ball where the hernia was repaired and tenderness and swelling in the area surrounding it. No fever. Just discomfort. I think I may be headed for revision surgery. I have behaved too. This started 10 days ago and has gotten steadily worse. I am now seeing NP from Dr Shyla Alvares practice late Friday, 10/29. I cant get in to see Dr. March Lanes until 11/9. Since I respect you more than any Doctor, please advise me on how to handle this. What do I need to look out for and go to the ER for if this continues to progress like it has. Do I even get March Lanes to redo this? What are your thoughts? I can make an appointment to see you or Jurgen Au too. I am so upset. Thanks.

## 2021-11-04 RX ORDER — HYDROCHLOROTHIAZIDE 25 MG/1
TABLET ORAL
Qty: 90 TABLET | Refills: 1 | Status: SHIPPED | OUTPATIENT
Start: 2021-11-04 | End: 2022-04-22

## 2021-12-29 ENCOUNTER — DOCUMENTATION ONLY (OUTPATIENT)
Dept: SURGERY | Age: 61
End: 2021-12-29

## 2021-12-29 ENCOUNTER — TELEPHONE (OUTPATIENT)
Dept: SURGERY | Age: 61
End: 2021-12-29

## 2021-12-29 NOTE — TELEPHONE ENCOUNTER
Feng Veloz from [address] [place] [address] called about pts appointment on 1/3/22 for a Breast MRI. She was trying to compare confirmation codes. [id number] is the one she has but it has [facility] on the request and not [facility]. This was done through [facility] but there is no documentation of when this was done or who spoke with Bernie. Kim Narvaez can be reached at [phone].

## 2021-12-29 NOTE — PROGRESS NOTES
Called Katie at [facility] back at the number she left. I left her a message ([phone]) stating all I see that is approved is the MRI at [address]. I let her know we will likely need to do a different auth for JW. I asked if she could let us know what the tax ID number is for JW. Will await her return call.

## 2022-01-25 ENCOUNTER — DOCUMENTATION ONLY (OUTPATIENT)
Dept: SURGERY | Age: 62
End: 2022-01-25

## 2022-01-25 NOTE — PROGRESS NOTES
I called patient's insurance to get the Facility changed from [facility]  on the authorization for Breast MRI scheduled for 1/25/2022 at [facility]. The authorization had to be redone:    Auth # [id number]     Ordering provider- [address] [place] [address]    Approved 1 day     Valid- 1/22/2022 through 4/21/2022    Bilateral MRI with/without contrast    I notified the patient with the auth# and called and left a voice mail at [address] [place] [address] at [phone] with  the auth #  And my personal cell number if they needed anything additional message.

## 2022-02-28 ENCOUNTER — PATIENT MESSAGE (OUTPATIENT)
Dept: SURGERY | Age: 62
End: 2022-02-28

## 2022-02-28 ENCOUNTER — TELEPHONE (OUTPATIENT)
Dept: SURGERY | Age: 62
End: 2022-02-28

## 2022-03-18 PROBLEM — E66.01 OBESITY, MORBID (HCC): Status: ACTIVE | Noted: 2018-07-03

## 2022-03-19 PROBLEM — G47.33 OSA (OBSTRUCTIVE SLEEP APNEA): Status: ACTIVE | Noted: 2018-11-11

## 2022-03-20 PROBLEM — N39.3 STRESS INCONTINENCE OF URINE: Status: ACTIVE | Noted: 2018-11-01

## 2022-04-22 RX ORDER — HYDROCHLOROTHIAZIDE 25 MG/1
TABLET ORAL
Qty: 90 TABLET | Refills: 1 | Status: SHIPPED | OUTPATIENT
Start: 2022-04-22 | End: 2022-10-16

## 2022-04-25 ENCOUNTER — TELEPHONE (OUTPATIENT)
Dept: OBGYN CLINIC | Age: 62
End: 2022-04-25

## 2022-04-25 RX ORDER — ESTRADIOL 1 MG/1
1 TABLET ORAL DAILY
Qty: 30 TABLET | Refills: 0 | Status: SHIPPED | OUTPATIENT
Start: 2022-04-25 | End: 2022-05-09 | Stop reason: SDUPTHER

## 2022-04-25 NOTE — TELEPHONE ENCOUNTER
Call received at 300pM    63year old patient last seen in the office on 3/9/2021 and has next appointment on 5/6/2022    Patient calling for a refill of her estrace to get her to her appointment      Prescription sent as per Md order to get patient to her scheduled appointment    Patient advised of need to keep appointment in order to get further refills    Patient verbalized understanding.

## 2022-05-02 ENCOUNTER — PATIENT MESSAGE (OUTPATIENT)
Dept: INTERNAL MEDICINE CLINIC | Age: 62
End: 2022-05-02

## 2022-05-02 NOTE — TELEPHONE ENCOUNTER
Spoke with patient and she reports that she was bitten by the cat on 4/30/22 at 5 PM. She has verified through the vet that the cat was only vaccinated approx 2 years ago for rabies and not since. She has 6 puncture wounds to her left index finger. She did go to urgent care and they cleaned the wounds and started her on Augmentin 875 mg bid x 10 days. She wants to know if she needs rabies shots? Dr Carolyn Harris notified.

## 2022-05-02 NOTE — TELEPHONE ENCOUNTER
----- Message from Gloria Francis sent at 2022 10:43 AM EDT -----  Regarding: Cat Bite  She [facility] It  while they were waiting to be seen. She came home and buried it. She told me that the Cat was vaccinated at the same vets office I go to. I called this morning to verify that the cat was vaccinated since I was bitten along with my yellow lab/jhaveri retriever, Darren. Darren was the one that got a hold of the cat. My other dog was scratched. My Vets office had not vaccinated the cat. I called the Neighbor to ask her for proof of vaccination. The cat has only had is original shot when it was spayed in 2020. It has not had its Rabies booster. What does Dr NICO CHRISTIE recommend. This happened at 5 pm on Saturday. Please advise. Thank you.

## 2022-05-09 ENCOUNTER — OFFICE VISIT (OUTPATIENT)
Dept: OBGYN CLINIC | Age: 62
End: 2022-05-09
Payer: COMMERCIAL

## 2022-05-09 VITALS — WEIGHT: 293 LBS | BODY MASS INDEX: 52 KG/M2 | DIASTOLIC BLOOD PRESSURE: 88 MMHG | SYSTOLIC BLOOD PRESSURE: 160 MMHG

## 2022-05-09 DIAGNOSIS — Z01.419 WELL WOMAN EXAM WITH ROUTINE GYNECOLOGICAL EXAM: ICD-10-CM

## 2022-05-09 DIAGNOSIS — Z01.419 ENCOUNTER FOR GYNECOLOGICAL EXAMINATION WITHOUT ABNORMAL FINDING: Primary | ICD-10-CM

## 2022-05-09 PROCEDURE — 99396 PREV VISIT EST AGE 40-64: CPT | Performed by: OBSTETRICS & GYNECOLOGY

## 2022-05-09 RX ORDER — ESTRADIOL 1 MG/1
1 TABLET ORAL DAILY
Qty: 90 TABLET | Refills: 3 | Status: SHIPPED | OUTPATIENT
Start: 2022-05-09

## 2022-05-09 RX ORDER — AMOXICILLIN AND CLAVULANATE POTASSIUM 875; 125 MG/1; MG/1
TABLET, FILM COATED ORAL
COMMUNITY
Start: 2022-04-30 | End: 2022-10-28 | Stop reason: ALTCHOICE

## 2022-05-09 NOTE — PATIENT INSTRUCTIONS

## 2022-05-09 NOTE — PROGRESS NOTES
Scott Blood is a No obstetric history on file. ,  63 y.o. female WHITE/NON- whose Patient's last menstrual period was 08/20/2000. was on  who presents for her annual checkup. She is having no significant problems. Hormone Status:    She is not having vasomotor symptoms. The patient is using HRT: estradiol    Sexual history:    She  reports previously being sexually active. Medical conditions:    Since her last annual GYN exam about one year ago, she has had the following changes in her health history: none. Pap and Mammogram History:    Her most recent Pap smear was normal obtained 3 year(s) ago on 10/10/18. The patient has a Mammogram scheduled for 8/12/22. She had a bilateral breast MRI d/t family hx of breast cancer on 2/9/22 that was normal.    Breast Cancer History/Substance Abuse:    She has a family history of breast cancer. Osteoporosis History:    Family history does not include a first or second degree relative with osteopenia or osteoporosis. A bone density scan has not been previously obtained. Past Medical History:   Diagnosis Date    AR (allergic rhinitis)     Dr. Gaurav Contreras (basal cell carcinoma of skin)     Dr. Kate Cline. BCC skin cancer right ankle    Biliary dyskinesia     s/p azeb    Depression     Endometriosis     [facility] 10/2008 Dr. Germain Macias Environmental allergies 10/2010    oats, barley, cotton seed. Dr. Kirsty Sheriff FH: breast cancer in first degree relative     Dr. Jeremiah Ayala. mother dx breast cancer age 48.  Gluten intolerance     HTN (hypertension)     DR. Rubina Bolton    Hyperlipidemia LDL goal < 130     Hypothyroidism     Dr. Ashley Chavez    Impaired fasting glucose     Dr. Evon Spann Menopausal disorder     MR (mitral regurgitation)     mild    FRANSISCA (obstructive sleep apnea) 11/11/2018    mild.   [facility]    Sebaceous cyst of breast     Dr. Jeremiah Ayala    Skin abnormalities 8/2010    pre-cancerous lesions of face, Dr. Cassia Sweeney Vitamin D deficiency  VSD (ventricular septal defect)     self-closed age 11, TTE normal 3/09     Past Surgical History:   Procedure Laterality Date    COLONOSCOPY  9/2009    Dr. Josephine Alvarenga    HX COLONOSCOPY  07/29/2015    normal.  Dr. Riaz Mireles HX CYST REMOVAL  06/2017    Dr. Susanne Bedoya HX ENDOSCOPY  07/29/2015    gastritis    HX HEART CATHETERIZATION      age 5, age 11 due to VSD    HX HERNIA REPAIR  09/16/2021    Dr. [facility] Montel[facility] at [facility]    HX HYSTERECTOMY      supracervical    HX LAP CHOLECYSTECTOMY  8/01/2014    Dr. Sandie Medina/Kun and adhesions    HX SALPINGO-OOPHORECTOMY      HX TONSILLECTOMY      IN FEMUR/KNEE SURG UNLISTED  12/30/2010    left, had lipoma removed. Dr. Brittany Peck     Tobacco History:  reports that she has never smoked. She has never used smokeless tobacco.  Alcohol Abuse:  reports no history of alcohol use. Drug Abuse:  reports no history of drug use. Current Outpatient Medications   Medication Sig Dispense Refill    amoxicillin-clavulanate (AUGMENTIN) 875-125 mg per tablet TAKE 1 TABLET BY MOUTH EVERY 12 HOURS AS DIRECTED FOR 10 DAYS      estradioL (ESTRACE) 1 mg tablet Take 1 Tablet by mouth daily. 30 Tablet 0    hydroCHLOROthiazide (HYDRODIURIL) 25 mg tablet TAKE 1 TABLET BY MOUTH EVERY DAY 90 Tablet 1    furosemide (LASIX) 20 mg tablet as needed.  ferrous sulfate (Iron) 325 mg (65 mg iron) tablet Take  by mouth Daily (before breakfast).  Biotin 2,500 mcg cap Take  by mouth.  levothyroxine (Synthroid) 175 mcg tablet Take 1 pill daily 5 days per  week 30 Tablet 4    levothyroxine (SYNTHROID) 150 mcg tablet Take 1 Tablet by mouth Daily (before breakfast). Take 1 pill twice per week 10 Tablet 5    phentermine (ADIPEX-P) 37.5 mg tablet Take 37.5 mg by mouth every morning. Taking 1/2 tab daily      olmesartan (BENICAR) 40 mg tablet olmesartan 40 mg tablet      magnesium oxide (MAG-OX) 400 mg tablet Take 400 mg by mouth daily.       ZINC PO Take 1 Tab by mouth daily.  ASCORBATE CALCIUM (VITAMIN C PO) Take 1 Tab by mouth daily.  cholecalciferol, vitamin D3, (VITAMIN D3) 4,000 unit cap Take 1 Tab by mouth daily.  VITAMIN B COMPLEX PO Take 1 Tab by mouth daily.  Ozempic 0.25 mg or 0.5 mg/dose (2 mg/1.5 ml) subq pen 0.5 mg by SubCUTAneous route every seven (7) days. (Patient not taking: Reported on 10/8/2021) 1 Box 3    budesonide-formoteroL (SYMBICORT) 160-4.5 mcg/actuation HFAA Take 2 Puffs by inhalation two (2) times a day. 1 Inhaler 5    albuterol (PROVENTIL HFA, VENTOLIN HFA, PROAIR HFA) 90 mcg/actuation inhaler Take 1 Puff by inhalation every six (6) hours as needed for Wheezing. 1 Inhaler 5    albuterol (PROVENTIL VENTOLIN) 2.5 mg /3 mL (0.083 %) nebu 3 mL by Nebulization route every four (4) hours as needed for Wheezing. 30 Nebule 2     Allergies: Effexor [venlafaxine], Gluten, Levaquin [levofloxacin], Lisinopril, Metformin, Other medication, and Tramadol   Social History     Socioeconomic History    Marital status: SINGLE     Spouse name: Not on file    Number of children: 0    Years of education: Not on file    Highest education level: Not on file   Occupational History    Not on file   Tobacco Use    Smoking status: Never Smoker    Smokeless tobacco: Never Used   Substance and Sexual Activity    Alcohol use: No    Drug use: No    Sexual activity: Not Currently   Other Topics Concern    Not on file   Social History Narrative    Not on file     Social Determinants of Health     Financial Resource Strain:     Difficulty of Paying Living Expenses: Not on file   Food Insecurity:     Worried About Running Out of Food in the Last Year: Not on file    Mila of Food in the Last Year: Not on file   Transportation Needs:     Lack of Transportation (Medical): Not on file    Lack of Transportation (Non-Medical):  Not on file   Physical Activity:     Days of Exercise per Week: Not on file    Minutes of Exercise per Session: Not on file   Stress:     Feeling of Stress : Not on file   Social Connections:     Frequency of Communication with Friends and Family: Not on file    Frequency of Social Gatherings with Friends and Family: Not on file    Attends Orthodoxy Services: Not on file    Active Member of Clubs or Organizations: Not on file    Attends Club or Organization Meetings: Not on file    Marital Status: Not on file   Intimate Partner Violence:     Fear of Current or Ex-Partner: Not on file    Emotionally Abused: Not on file    Physically Abused: Not on file    Sexually Abused: Not on file   Housing Stability:     Unable to Pay for Housing in the Last Year: Not on file    Number of [place] in the Last Year: Not on file    Unstable Housing in the Last Year: Not on file     Patient Active Problem List   Diagnosis Code    HTN (hypertension) I10    Hypothyroidism E03.9    AR (allergic rhinitis) J30.9    Impaired fasting glucose R73.01    Biliary dyskinesia K82.8    Endometriosis N80.9    Hyperlipidemia with target LDL less than 130 E78.5    VSD (ventricular septal defect) Q21.0    Menopausal disorder N95.9    Depression F32. A    Gluten intolerance K90.41    Vitamin D deficiency E55.9    MR (mitral regurgitation) I34.0    Sebaceous cyst of skin of breast N60.89    Sebaceous cyst of breast N60.89    Skin abnormalities L98.9    Environmental allergies Z91.09    Obesity, morbid (HCC) E66.01    Stress incontinence of urine N39.3    FRANSISCA (obstructive sleep apnea) G47.33    FH: breast cancer in first degree relative Z80.3       Review of Systems - History obtained from the patient  Constitutional: negative for weight loss, fever, night sweats  HEENT: negative for hearing loss, earache, congestion, snoring, sorethroat  CV: negative for chest pain, palpitations, edema  Resp: negative for cough, shortness of breath, wheezing  GI: negative for change in bowel habits, abdominal pain, black or bloody stools  : negative for frequency, dysuria, hematuria, vaginal discharge  MSK: negative for back pain, joint pain, muscle pain  Breast: negative for breast lumps, nipple discharge, galactorrhea  Skin :negative for itching, rash, hives  Neuro: negative for dizziness, headache, confusion, weakness  Psych: negative for anxiety, depression, change in mood  Heme/lymph: negative for bleeding, bruising, pallor    Physical Exam    Visit Vitals  BP (!) 160/88   Wt 332 lb (150.6 kg)   LMP 08/20/2000   BMI 52.00 kg/m²     Constitutional  · Appearance: well-nourished, well developed, alert, in no acute distress    HENT  · Head and Face: appears normal    Neck  · Inspection/Palpation: normal appearance, no masses or tenderness  Lymph Nodes: no lymphadenopathy present    Chest  · Respiratory Effort: breathing normal    Breasts  · Inspection of Breasts: breasts symmetrical, no skin changes, no discharge present, nipple appearance normal, no skin retraction present  · Palpation of Breasts and Axillae: no masses present on palpation, no breast tenderness  · Axillary Lymph Nodes: no lymphadenopathy present    Gastrointestinal  · Abdominal Examination: abdomen non-tender to palpation, normal bowel sounds, no masses present  · Liver and spleen: no hepatomegaly present, spleen not palpable  · Hernias: no hernias identified    Genitourinary  · External Genitalia: normal appearance for age, no discharge present, no tenderness present, no inflammatory lesions present, no masses present, no atrophy present  · Vagina: normal vaginal vault without central or paravaginal defects, no discharge present, no inflammatory lesions present, no masses present  · Bladder: non-tender to palpation  · Urethra: appears normal  · Cervix: normal  · Uterus: absent  · Adnexa: no adnexal tenderness present, no adnexal masses present  · Perineum: perineum within normal limits, no evidence of trauma, no rashes or skin lesions present  · Anus: anus within normal limits, no hemorrhoids present  · Inguinal Lymph Nodes: no lymphadenopathy present      Skin  · General Inspection: no rash, no lesions identified    Neurologic/Psychiatric  · Mental Status:  · Orientation: grossly oriented to person, place and time  · Mood and Affect: mood normal, affect appropriate    . Assessment:  Routine gynecologic examination  Her current medical status is satisfactory with no evidence of significant gynecologic issues.     Plan:  Counseled re: diet, exercise, healthy lifestyle  Return for yearly wellness visits  Rec annual mammogram  Patient Verbalized understanding

## 2022-05-13 LAB
CYTOLOGIST CVX/VAG CYTO: NORMAL
CYTOLOGY CVX/VAG DOC CYTO: NORMAL
CYTOLOGY CVX/VAG DOC THIN PREP: NORMAL
CYTOLOGY HISTORY:: NORMAL
DX ICD CODE: NORMAL
HPV I/H RISK 4 DNA CVX QL PROBE+SIG AMP: NEGATIVE
Lab: NORMAL
Lab: NORMAL
OTHER STN SPEC: NORMAL
STAT OF ADQ CVX/VAG CYTO-IMP: NORMAL

## 2022-09-07 LAB — HBA1C MFR BLD HPLC: 5.6 %

## 2022-09-22 DIAGNOSIS — Z12.31 ENCOUNTER FOR SCREENING MAMMOGRAM FOR BREAST CANCER: Primary | ICD-10-CM

## 2022-10-16 RX ORDER — HYDROCHLOROTHIAZIDE 25 MG/1
TABLET ORAL
Qty: 90 TABLET | Refills: 1 | Status: SHIPPED | OUTPATIENT
Start: 2022-10-16

## 2022-10-28 ENCOUNTER — OFFICE VISIT (OUTPATIENT)
Dept: INTERNAL MEDICINE CLINIC | Age: 62
End: 2022-10-28
Payer: COMMERCIAL

## 2022-10-28 VITALS
SYSTOLIC BLOOD PRESSURE: 103 MMHG | WEIGHT: 293 LBS | DIASTOLIC BLOOD PRESSURE: 56 MMHG | HEART RATE: 88 BPM | HEIGHT: 67 IN | OXYGEN SATURATION: 98 % | BODY MASS INDEX: 45.99 KG/M2 | TEMPERATURE: 98.8 F | RESPIRATION RATE: 16 BRPM

## 2022-10-28 DIAGNOSIS — E66.01 MORBID OBESITY WITH BMI OF 50.0-59.9, ADULT (HCC): ICD-10-CM

## 2022-10-28 DIAGNOSIS — I10 PRIMARY HYPERTENSION: ICD-10-CM

## 2022-10-28 DIAGNOSIS — Z00.00 PREVENTATIVE HEALTH CARE: Primary | ICD-10-CM

## 2022-10-28 DIAGNOSIS — M54.31 RIGHT SIDED SCIATICA: ICD-10-CM

## 2022-10-28 DIAGNOSIS — Z23 NEEDS FLU SHOT: ICD-10-CM

## 2022-10-28 DIAGNOSIS — E03.9 ACQUIRED HYPOTHYROIDISM: ICD-10-CM

## 2022-10-28 PROCEDURE — 90471 IMMUNIZATION ADMIN: CPT | Performed by: INTERNAL MEDICINE

## 2022-10-28 PROCEDURE — 90686 IIV4 VACC NO PRSV 0.5 ML IM: CPT | Performed by: INTERNAL MEDICINE

## 2022-10-28 PROCEDURE — 99396 PREV VISIT EST AGE 40-64: CPT | Performed by: INTERNAL MEDICINE

## 2022-10-28 RX ORDER — LEVOTHYROXINE SODIUM 175 UG/1
TABLET ORAL
Qty: 30 TABLET | Refills: 5
Start: 2022-10-28

## 2022-10-28 RX ORDER — TIRZEPATIDE 5 MG/.5ML
INJECTION, SOLUTION SUBCUTANEOUS
COMMUNITY
Start: 2022-09-15

## 2022-10-28 RX ORDER — ZOSTER VACCINE RECOMBINANT, ADJUVANTED 50 MCG/0.5
0.5 KIT INTRAMUSCULAR ONCE
Qty: 0.5 ML | Refills: 1
Start: 2022-10-28 | End: 2022-10-28

## 2022-10-28 RX ORDER — LEVOTHYROXINE SODIUM 150 UG/1
TABLET ORAL
Qty: 30 TABLET | Refills: 5
Start: 2022-10-28

## 2022-10-28 NOTE — PROGRESS NOTES
Alli Kendall is a 63 y.o. female  Presenting for her annual checkup and follow-up    Doing fairly well. Working for [employer] and enjoys the job. Shoulder pains, right. Chronic. May need replacement. Right sciatica for a few weeks. No injury. Improving some. Trying not to ambulate up steps. Symptoms are gradually improving. Seeing breast clinic for high risk    Seeing Dr. Amira Vernon in endocrinology. Recent labs done September 7 as noted below. Taking Mounjaro for 7 weeks for borderline DM, obesity. Feels less nausea and more energy and is tolerating much better than she was Ozempic. Lost 8 lb so far since changing medication. Hypothyroidism. TSH was borderline low. Decreased thyroid replacement dose slightly. .  Taking Levoxyl now due to insurance. Wt Readings from Last 3 Encounters:   10/28/22 329 lb 12.8 oz (149.6 kg)   05/09/22 332 lb (150.6 kg)   10/08/21 330 lb (149.7 kg)     Thyroid Panel With TSH   2022-09-07     TSH 0.424   0.450-4.500   Thyroxine (T4) 11.1   4.5-12.0   T3 Uptake 25   24-39   Free Thyroxine Index 2.8   1.2-4.9   Hemoglobin A1c   2022-09-07     Hemoglobin A1c 5.6   4.8-5.6   Free T4   2022-09-07     T4,Free(Direct) 1.76   0.82-1.77   CBC With Differential/Platelet [id number]   6363-43-65     WBC 7.0   3.4-10.8   RBC 4.38   3.77-5.28   Hemoglobin 12.7   11.1-15.9   Hematocrit 38.1   34.0-46. 6   MCV 87   79-97   MCH 29.0   26.6-33.0   MCHC 33.3   31.5-35.7   RDW 13.3   11.7-15.4   Platelets 152   637-570   Neutrophils 63   Not Estab. Lymphs 31   Not Estab. Monocytes 6   Not Estab. Eos 0   Not Estab. Basos 0   Not Estab. Immature Cells         Neutrophils (Absolute) 4.4   1.4-7.0   Lymphs (Absolute) 2.2   0.7-3.1   Monocytes(Absolute) 0.4   0.1-0.9   Eos (Absolute) 0.0   0.0-0.4   Baso (Absolute) 0.0   0.0-0.2   Immature Granulocytes 0   Not Estab.    Immature Grans (Abs) 0.0   0.0-0.1   NRBC         Hematology Comments:         Lipid Panel   2022-09-07 Cholesterol, Total 166   100-199   Triglycerides 97   0-149   HDL Cholesterol 51   >39   VLDL Cholesterol Tone 18   5-40   LDL Chol Calc ([facility]) 97   0-99   Comment:         Comp.  Metabolic Panel (14)   9838-64-11     Glucose 109   65-99   BUN 11   6-24   Creatinine 0.62   0.57-1.00   eGFR 103   >59   BUN/Creatinine Ratio 18   9-23   Sodium 139   134-144   Potassium 4.4   3.5-5.2   Chloride 98      Carbon Dioxide, Total 26   20-29   Calcium 9.6   8.7-10.2   Protein, Total 6.4   6.0-8.5   Albumin 4.3   3.8-4.9   Globulin, Total 2.1   1.5-4.5   A/G Ratio 2.0   1.2-2.2   Bilirubin, Total 0.4   0.0-1.2   Alkaline Phosphatase 96      AST (SGOT) 17   0-40   ALT (SGPT) 16   0-32     Last breast exam: per breast clinic  Last PAP/pelvic: 5/9/22  Last colonoscopy: 7/29/15  Last DEXA:   Health Maintenance   Topic Date Due    Shingrix Vaccine Age 50> (1 of 2) Never done    COVID-19 Vaccine (3 - Booster for Pfizer series) 06/22/2021    Flu Vaccine (1) 08/01/2022    A1C test (Diabetic or Prediabetic)  08/25/2022    Depression Monitoring  10/01/2022    Breast Cancer Screen Mammogram  10/08/2022    Colorectal Cancer Screening Combo  07/29/2025    Lipid Screen  04/10/2026    DTaP/Tdap/Td series (3 - Td or Tdap) 10/01/2031    Hepatitis C Screening  Completed    Pneumococcal 0-64 years  Aged Out       Exercise: moderately active  Diet: generally follows a low fat low cholesterol diet    Vaccinations reviewed  Immunization History   Administered Date(s) Administered    COVID-19, PFIZER PURPLE top, DILUTE for use, (age 15 y+), IM, 30mcg/0.3mL 03/29/2021, 04/27/2021    Influenza Vaccine 08/24/2016, 08/01/2017, 10/18/2018, 09/04/2020    Influenza Vaccine Whole 09/21/2010    Influenza, FLUARIX, FLULAVAL, Riaz Comp (age 10 mo+) AND AFLURIA, (age 1 y+), PF, 0.5mL 10/18/2018, 10/04/2019, 10/01/2021    Pneumococcal Polysaccharide (PPSV-23) 11/01/2018    TD Vaccine 03/05/2006    TDAP Vaccine 08/31/2011    Tdap 10/01/2021 Allergies: Effexor [venlafaxine], Gluten, Levaquin [levofloxacin], Lisinopril, Metformin, Other medication, and Tramadol  Current Outpatient Medications   Medication Sig    Mounjaro 5 mg/0.5 mL pnij INJECT 5MG SUBCUTANEOUSLY ONCE WEEKLY    hydroCHLOROthiazide (HYDRODIURIL) 25 mg tablet TAKE 1 TABLET BY MOUTH EVERY DAY    estradioL (ESTRACE) 1 mg tablet Take 1 Tablet by mouth daily. furosemide (LASIX) 20 mg tablet as needed. ferrous sulfate 325 mg (65 mg iron) tablet Take  by mouth Daily (before breakfast). Biotin 2,500 mcg cap Take  by mouth.    levothyroxine (Synthroid) 175 mcg tablet Take 1 pill daily 5 days per  week    levothyroxine (SYNTHROID) 150 mcg tablet Take 1 Tablet by mouth Daily (before breakfast). Take 1 pill twice per week    phentermine (ADIPEX-P) 37.5 mg tablet Take 37.5 mg by mouth every morning. Taking 1/2 tab daily    olmesartan (BENICAR) 40 mg tablet olmesartan 40 mg tablet    magnesium oxide (MAG-OX) 400 mg tablet Take 400 mg by mouth daily. ZINC PO Take 1 Tab by mouth daily. ASCORBATE CALCIUM (VITAMIN C PO) Take 1 Tab by mouth daily. cholecalciferol, vitamin D3, 100 mcg (4,000 unit) cap Take 1 Tab by mouth daily. VITAMIN B COMPLEX PO Take 1 Tab by mouth daily. No current facility-administered medications for this visit. has a past medical history of AR (allergic rhinitis), BCC (basal cell carcinoma of skin), Biliary dyskinesia, Depression, Endometriosis, Environmental allergies (10/2010), FH: breast cancer in first degree relative, Gluten intolerance, HTN (hypertension), Hyperlipidemia LDL goal < 130, Hypothyroidism, Impaired fasting glucose, Menopausal disorder, MR (mitral regurgitation), FRANSISCA (obstructive sleep apnea) (11/11/2018), Sebaceous cyst of breast, Skin abnormalities (8/2010), Vitamin D deficiency, and VSD (ventricular septal defect).   Past Surgical History:   Procedure Laterality Date    COLONOSCOPY  9/2009    Dr. Maci Blair COLONOSCOPY  07/29/2015 normal.  Dr. Mulu Devine    HX CYST REMOVAL  06/2017    Dr. Celso Delacruz ENDOSCOPY  07/29/2015    gastritis    HX HEART CATHETERIZATION      age 5, age 11 due to VSD    HX HERNIA REPAIR  09/16/2021    Dr. Grabiel V[facility]quez at [facility]      supracervical    HX LAP CHOLECYSTECTOMY  8/01/2014    Dr. Lázaro Medina/Kun and adhesions    HX SALPINGO-OOPHORECTOMY      HX TONSILLECTOMY      ID FEMUR/KNEE SURG UNLISTED  12/30/2010    left, had lipoma removed.   Dr. Severo Halo History     Socioeconomic History    Marital status: SINGLE     Spouse name: Not on file    Number of children: 0    Years of education: Not on file    Highest education level: Not on file   Occupational History    Not on file   Tobacco Use    Smoking status: Never    Smokeless tobacco: Never   Substance and Sexual Activity    Alcohol use: No    Drug use: No    Sexual activity: Not Currently   Other Topics Concern    Not on file   Social History Narrative    Not on file     Social Determinants of Health     Financial Resource Strain: Not on file   Food Insecurity: Not on file   Transportation Needs: Not on file   Physical Activity: Not on file   Stress: Not on file   Social Connections: Not on file   Intimate Partner Violence: Not on file   Housing Stability: Not on file     Family History   Problem Relation Age of Onset    Stroke Mother     Breast Cancer Mother 48        breast age 48    Heart Disease Father         CABGx4    High Cholesterol Father     Deep Vein Thrombosis Father         left arm    Diabetes Maternal Grandmother     Heart Disease Paternal Grandfather     High Cholesterol Paternal Grandfather     Heart Attack Paternal Grandfather     Arthritis-rheumatoid Paternal Grandmother     Thyroid Disease Paternal Grandmother     Osteoporosis Maternal Aunt     Breast Cancer Other        Review of Systems - History obtained from the patient  General ROS: negative for - night sweats, weight gain or weight loss  Cardiovascular ROS: no chest pain, dyspnea on exertion, edema  GYN ROS: no vaginal bleeding, no hot flashes. Physical exam  Blood pressure (!) 103/56, pulse 88, temperature 98.8 °F (37.1 °C), temperature source Oral, resp. rate 16, height 5' 7\" (1.702 m), weight 329 lb 12.8 oz (149.6 kg), last menstrual period 08/20/2000, SpO2 98 %. Wt Readings from Last 3 Encounters:   10/28/22 329 lb 12.8 oz (149.6 kg)   05/09/22 332 lb (150.6 kg)   10/08/21 330 lb (149.7 kg)     she appears well, alert and oriented x 3, pleasant and cooperative. Vitals as noted. No rashes or significant lesions. Neck supple and free of adenopathy, or masses. No thyromegaly or carotid bruits. Cranial nerves normal. Lungs are clear to auscultation. Heart sounds are normal with 2/6 murmur, clicks, gallops or rubs. Abdomen is soft, non- tender, with no masses or organomegaly. Extremities, peripheral pulses and reflexes are normal.  .   Mild pain right upper buttock region radiating to posterior thigh at times. No weakness. Diagnoses and all orders for this visit:    1. Preventative health care  Recommend Shingrix vaccination and COVID-19 bivalent booster.  -     Shingrix, PF, 50 mcg/0.5 mL susr injection; 0.5 mL by IntraMUSCular route once for 1 dose. Receive 2nd dose in 2-6 months. For Shingles (Zoster) prevention    2. Needs flu shot  -     INFLUENZA, FLUARIX, FLULAVAL, FLUZONE (AGE 6 MO+), AFLURIA(AGE 3Y+) IM, PF, 0.5 ML    3. Primary hypertension  Blood pressure is somewhat overcontrolled today. Continue hydrochlorothiazide, olmesartan  Monitor. May be able to decrease medications in the future. 4. Acquired hypothyroidism  Managed by endocrinology Dr. Yamilet Farr. Recently lowered dose slightly to below. Has close follow-up. -     LevoxyL 150 mcg tablet; Take 1 pill three days per week on Mon Wed Fri  -     LevoxyL 175 mcg tablet; Take 1 pill four days per week    5.  Morbid obesity with BMI of 50.0-59.9, adult [facility])  Doing very well with Monlastro  Continue current medication as managed and prescribed by endocrinology    6.  Right sided sciatica  Stretching exercises demonstrated for for this condition  Refer to PT any time    The patient is asked to make an attempt to improve diet and exercise patterns    Return for yearly wellness visits

## 2022-10-28 NOTE — PATIENT INSTRUCTIONS
Vaccine Information Statement    Influenza (Flu) Vaccine (Inactivated or Recombinant): What You Need to Know    Many vaccine information statements are available in Maltese and other languages. See www.immunize.org/vis. Hojas de información sobre vacunas están disponibles en español y en muchos otros idiomas. Visite www.immunize.org/vis. 1. Why get vaccinated? Influenza vaccine can prevent influenza (flu). Flu is a contagious disease that spreads around the [facility] every year, usually between October and May. Anyone can get the flu, but it is more dangerous for some people. Infants and young children, people 72 years and older, pregnant people, and people with certain health conditions or a weakened immune system are at greatest risk of flu complications. Pneumonia, bronchitis, sinus infections, and ear infections are examples of flu-related complications. If you have a medical condition, such as heart disease, cancer, or diabetes, flu can make it worse. Flu can cause fever and chills, sore throat, muscle aches, fatigue, cough, headache, and runny or stuffy nose. Some people may have vomiting and diarrhea, though this is more common in children than adults. In an average year, thousands of people in the [facility] die from flu, and many more are hospitalized. Flu vaccine prevents millions of illnesses and flu-related visits to the doctor each year. 2. Influenza vaccines     CDC recommends everyone 6 months and older get vaccinated every flu season. Children 6 months through 6years of age may need 2 doses during a single flu season. Everyone else needs only 1 dose each flu season. It takes about 2 weeks for protection to develop after vaccination. There are many flu viruses, and they are always changing. Each year a new flu vaccine is made to protect against the influenza viruses believed to be likely to cause disease in the upcoming flu season.  Even when the vaccine doesnt exactly match these viruses, it may still provide some protection. Influenza vaccine does not cause flu. Influenza vaccine may be given at the same time as other vaccines. 3. Talk with your health care provider    Tell your vaccination provider if the person getting the vaccine:  Has had an allergic reaction after a previous dose of influenza vaccine, or has any severe, life-threatening allergies   Has ever had Guillain-Barré Syndrome (also called GBS)    In some cases, your health care provider may decide to postpone influenza vaccination until a future visit. Influenza vaccine can be administered at any time during pregnancy. People who are or will be pregnant during influenza season should receive inactivated influenza vaccine. People with minor illnesses, such as a cold, may be vaccinated. People who are moderately or severely ill should usually wait until they recover before getting influenza vaccine. Your health care provider can give you more information. 4. Risks of a vaccine reaction    Soreness, redness, and swelling where the shot is given, fever, muscle aches, and headache can happen after influenza vaccination. There may be a very small increased risk of Guillain-Barré Syndrome (GBS) after inactivated influenza vaccine (the flu shot). Ardine Gum children who get the flu shot along with pneumococcal vaccine (PCV13) and/or DTaP vaccine at the same time might be slightly more likely to have a seizure caused by fever. Tell your health care provider if a child who is getting flu vaccine has ever had a seizure. People sometimes faint after medical procedures, including vaccination. Tell your provider if you feel dizzy or have vision changes or ringing in the ears. As with any medicine, there is a very remote chance of a vaccine causing a severe allergic reaction, other serious injury, or death. 5. What if there is a serious problem?     An allergic reaction could occur after the vaccinated person leaves the clinic. If you see signs of a severe allergic reaction (hives, swelling of the face and throat, difficulty breathing, a fast heartbeat, dizziness, or weakness), call 9-1-1 and get the person to the nearest hospital.    For other signs that concern you, call your health care provider. Adverse reactions should be reported to the Vaccine Adverse Event Reporting System (VAERS). Your health care provider will usually file this report, or you can do it yourself. Visit the VAERS website at www.vaers. [facility].gov or call 1-448.915.2069. VAERS is only for reporting reactions, and VAERS staff members do not give medical advice. 6. The National Vaccine Injury Compensation Program    The [facility] Vaccine Injury Compensation Program (VICP) is a federal program that was created to compensate people who may have been injured by certain vaccines. Claims regarding alleged injury or death due to vaccination have a time limit for filing, which may be as short as two years. Visit the VICP website at www.[facility].gov/vaccinecompensation or call 6-877.851.4192 to learn about the program and about filing a claim. 7. How can I learn more? Ask your health care provider. Call your local or state health department. Visit the website of the Food and Drug Administration (FDA) for vaccine package inserts and additional information at www.fda.gov/vaccines-blood-biologics/vaccines. Contact the Centers for Disease Control and Prevention (CDC): Call 9-316.797.4079 (3-891-HPJ-INFO) or  Visit CDCs influenza website at www.cdc.gov/flu. Vaccine Information Statement   Inactivated Influenza Vaccine   8/6/2021  42 RAMYA Duong [id number]-[id number]   Department of Health and Human Services  Centers for Disease Control and Prevention    Office Use Only

## 2022-11-30 ENCOUNTER — TELEPHONE (OUTPATIENT)
Dept: INTERNAL MEDICINE CLINIC | Age: 62
End: 2022-11-30

## 2022-11-30 NOTE — TELEPHONE ENCOUNTER
Spoke with patient and she reports intermittent  lower abdominal pain to her right side 8/10. Pt states its been going on for last week or two. Pt states yesterday was painful. She report some constipation and is taking stool softener once daily and her stools are normal soft formed brown. She reports that she had her Gallbladder removed 7 years ago. Denies pain with palpitation. Hysterectomy on 2008. Denies any fever, nausea or diarrhea. She has an appt scheduled for 12/6/22 with NP Yovani Acevedo and was wondering if there is any testing that should be done prior. Advised that if her condition should worsen or fail to improve to go to Urgent care for evaluation and treatment. She states understanding and grateful for the call. Dr. Ronnie Tian notified.

## 2022-11-30 NOTE — TELEPHONE ENCOUNTER
Spoke with patient and updated on Dr. Gustavo Cid comments and recommendations. She states understanding and grateful for the call.

## 2022-11-30 NOTE — TELEPHONE ENCOUNTER
Suspect combination of constipation, spasm, adhesions. If symptoms resolve, then ok to cont stool softener and observe. If persistent pain or fever, needs urgent care eval.  Can look for cancellations in practice.

## 2022-11-30 NOTE — TELEPHONE ENCOUNTER
Pt is calling states her stomach is in pain. Pt states its been going on for last week or two. Pt states yesterday was painful. Pt would like to speak with the nurse.  Please advise

## 2022-12-06 ENCOUNTER — TELEPHONE (OUTPATIENT)
Dept: INTERNAL MEDICINE CLINIC | Age: 62
End: 2022-12-06

## 2022-12-06 ENCOUNTER — OFFICE VISIT (OUTPATIENT)
Dept: INTERNAL MEDICINE CLINIC | Age: 62
End: 2022-12-06
Payer: COMMERCIAL

## 2022-12-06 ENCOUNTER — HOSPITAL ENCOUNTER (OUTPATIENT)
Dept: CT IMAGING | Age: 62
Discharge: HOME | End: 2022-12-06
Attending: NURSE PRACTITIONER
Payer: COMMERCIAL

## 2022-12-06 VITALS
HEIGHT: 67 IN | TEMPERATURE: 98 F | HEART RATE: 83 BPM | SYSTOLIC BLOOD PRESSURE: 115 MMHG | OXYGEN SATURATION: 99 % | DIASTOLIC BLOOD PRESSURE: 77 MMHG | WEIGHT: 293 LBS | RESPIRATION RATE: 16 BRPM | BODY MASS INDEX: 45.99 KG/M2

## 2022-12-06 DIAGNOSIS — R10.31 RLQ ABDOMINAL PAIN: Primary | ICD-10-CM

## 2022-12-06 DIAGNOSIS — R10.31 RLQ ABDOMINAL PAIN: ICD-10-CM

## 2022-12-06 PROCEDURE — 74177 CT ABD & PELVIS W/CONTRAST: CPT

## 2022-12-06 PROCEDURE — 74011000636 HC RX REV CODE- 636: Performed by: NURSE PRACTITIONER

## 2022-12-06 PROCEDURE — 99213 OFFICE O/P EST LOW 20 MIN: CPT | Performed by: NURSE PRACTITIONER

## 2022-12-06 RX ADMIN — IOPAMIDOL 100 ML: 755 INJECTION, SOLUTION INTRAVENOUS at 11:53

## 2022-12-06 NOTE — TELEPHONE ENCOUNTER
[facility] ct [facility] 1pm, pt needs to arrive at 11am. No food prior. Pt can drink water. Pt was given appt information in the office.

## 2022-12-07 LAB
ALBUMIN SERPL-MCNC: 4.4 G/DL (ref 3.8–4.9)
ALBUMIN/GLOB SERPL: 2 {RATIO} (ref 1.2–2.2)
ALP SERPL-CCNC: 101 IU/L (ref 44–121)
ALT SERPL-CCNC: 21 IU/L (ref 0–32)
AST SERPL-CCNC: 20 IU/L (ref 0–40)
BASOPHILS # BLD AUTO: 0 X10E3/UL (ref 0–0.2)
BASOPHILS NFR BLD AUTO: 0 %
BILIRUB SERPL-MCNC: 0.4 MG/DL (ref 0–1.2)
BUN SERPL-MCNC: 12 MG/DL (ref 8–27)
BUN/CREAT SERPL: 18 (ref 12–28)
CALCIUM SERPL-MCNC: 9.5 MG/DL (ref 8.7–10.3)
CHLORIDE SERPL-SCNC: 99 MMOL/L (ref 96–106)
CO2 SERPL-SCNC: 26 MMOL/L (ref 20–29)
CREAT SERPL-MCNC: 0.68 MG/DL (ref 0.57–1)
EGFR: 100 ML/MIN/1.73
EOSINOPHIL # BLD AUTO: 0 X10E3/UL (ref 0–0.4)
EOSINOPHIL NFR BLD AUTO: 0 %
ERYTHROCYTE [DISTWIDTH] IN BLOOD BY AUTOMATED COUNT: 13.5 % (ref 11.7–15.4)
GLOBULIN SER CALC-MCNC: 2.2 G/DL (ref 1.5–4.5)
GLUCOSE SERPL-MCNC: 100 MG/DL (ref 70–99)
HCT VFR BLD AUTO: 37.2 % (ref 34–46.6)
HGB BLD-MCNC: 13.3 G/DL (ref 11.1–15.9)
IMM GRANULOCYTES # BLD AUTO: 0 X10E3/UL (ref 0–0.1)
IMM GRANULOCYTES NFR BLD AUTO: 0 %
LYMPHOCYTES # BLD AUTO: 2.3 X10E3/UL (ref 0.7–3.1)
LYMPHOCYTES NFR BLD AUTO: 33 %
MCH RBC QN AUTO: 31.3 PG (ref 26.6–33)
MCHC RBC AUTO-ENTMCNC: 35.8 G/DL (ref 31.5–35.7)
MCV RBC AUTO: 88 FL (ref 79–97)
MONOCYTES # BLD AUTO: 0.4 X10E3/UL (ref 0.1–0.9)
MONOCYTES NFR BLD AUTO: 5 %
NEUTROPHILS # BLD AUTO: 4.2 X10E3/UL (ref 1.4–7)
NEUTROPHILS NFR BLD AUTO: 62 %
PLATELET # BLD AUTO: 237 X10E3/UL (ref 150–450)
POTASSIUM SERPL-SCNC: 4.7 MMOL/L (ref 3.5–5.2)
PROT SERPL-MCNC: 6.6 G/DL (ref 6–8.5)
RBC # BLD AUTO: 4.25 X10E6/UL (ref 3.77–5.28)
SODIUM SERPL-SCNC: 140 MMOL/L (ref 134–144)
WBC # BLD AUTO: 6.9 X10E3/UL (ref 3.4–10.8)

## 2022-12-07 NOTE — PROGRESS NOTES
Felix Meléndez (: 1960) is a 63 y.o. female, established patient, here for evaluation of the following chief complaint(s):  Abdominal Pain (Lower right side abdominal pain; started a couple weeks ago)       ASSESSMENT/PLAN:  Below is the assessment and plan developed based on review of pertinent history, physical exam, labs, studies, and medications. 1. RLQ abdominal pain -- will send for CT scan for further evaluation; if scan normal, will need further evaluation with GI  -     CT ABD PELV W CONT; Future  -     CBC WITH AUTOMATED DIFF; Future  -     METABOLIC PANEL, COMPREHENSIVE; Future        SUBJECTIVE/OBJECTIVE:  HPI    Patient of Dr Tarah Yanez presents with complaints of right lower abdominal pain that has been intermittent over the past 2 weeks. Reports she was treated for cellulitis of buttocks with 10 day course of Keflex after being seen at [facility] on . Developed some mild diarrhea afterwards but that has subsided. Was having intermittent stabbing pain in right lower back and right lower abdomen that became quite severe on . Reports she thought she may have to go to the ER due to severity of pain that lasted for several hours but pain eventually decreased. Denies fever, chills, nausea, vomiting. Has taken some leftover Tramadol as well as Ibuprofen and Tylenol with temporary improvement. Patient Active Problem List   Diagnosis Code    HTN (hypertension) I10    Hypothyroidism E03.9    AR (allergic rhinitis) J30.9    Impaired fasting glucose R73.01    Biliary dyskinesia K82.8    Endometriosis N80.9    Hyperlipidemia with target LDL less than 130 E78.5    VSD (ventricular septal defect) Q21.0    Menopausal disorder N95.9    Depression F32. A    Gluten intolerance K90.41    Vitamin D deficiency E55.9    MR (mitral regurgitation) I34.0    Sebaceous cyst of skin of breast N60.89    Sebaceous cyst of breast N60.89    Skin abnormalities L98.9    Environmental allergies Z91.09    Obesity, morbid (HCC) E66.01    Stress incontinence of urine N39.3    FRANSISCA (obstructive sleep apnea) G47.33    FH: breast cancer in first degree relative Z80.3     Past Surgical History:   Procedure Laterality Date    COLONOSCOPY  9/2009    Dr. Justina Torres    HX COLONOSCOPY  07/29/2015    normal.  Dr. Sundar Win    [address]    HX CYST REMOVAL  06/2017    Dr. Mason Beasley HX ENDOSCOPY  07/29/2015    gastritis    HX HEART CATHETERIZATION      age 5, age 11 due to VSD    HX HERNIA REPAIR  09/16/2021    Dr. Sirena Snyder at [address] [place] [address] Pkwy HX HYSTERECTOMY      supracervical    HX LAP CHOLECYSTECTOMY  8/01/2014    Dr. Luanne Medina/Kun and adhesions    HX SALPINGO-OOPHORECTOMY      HX TONSILLECTOMY      ID FEMUR/KNEE SURG UNLISTED  12/30/2010    left, had lipoma removed.   Dr. Emma Doshi History     Socioeconomic History    Marital status: SINGLE     Spouse name: Not on file    Number of children: 0    Years of education: Not on file    Highest education level: Not on file   Occupational History    Not on file   Tobacco Use    Smoking status: Never    Smokeless tobacco: Never   Substance and Sexual Activity    Alcohol use: No    Drug use: No    Sexual activity: Not Currently   Other Topics Concern    Not on file   Social History Narrative    Not on file     Social Determinants of Health     Financial Resource Strain: Not on file   Food Insecurity: Not on file   Transportation Needs: Not on file   Physical Activity: Not on file   Stress: Not on file   Social Connections: Not on file   Intimate Partner Violence: Not on file   Housing Stability: Not on file     Family History   Problem Relation Age of Onset    Stroke Mother     Breast Cancer Mother 48        breast age 48    Heart Disease Father         CABGx4    High Cholesterol Father     Deep Vein Thrombosis Father         left arm    Diabetes Maternal Grandmother     Heart Disease Paternal Murel Netters High Cholesterol Paternal Grandfather     Heart Attack Paternal Grandfather     Arthritis-rheumatoid Paternal Grandmother     Thyroid Disease Paternal Grandmother     Osteoporosis Maternal Aunt     Breast Cancer Other      Current Outpatient Medications   Medication Sig    Mounjaro 5 mg/0.5 mL pnij INJECT 5MG SUBCUTANEOUSLY ONCE WEEKLY    LevoxyL 150 mcg tablet Take 1 pill three days per week on Mon Wed Fri    LevoxyL 175 mcg tablet Take 1 pill four days per week    hydroCHLOROthiazide (HYDRODIURIL) 25 mg tablet TAKE 1 TABLET BY MOUTH EVERY DAY    estradioL (ESTRACE) 1 mg tablet Take 1 Tablet by mouth daily.  furosemide (LASIX) 20 mg tablet as needed.  ferrous sulfate 325 mg (65 mg iron) tablet Take  by mouth Daily (before breakfast).  Biotin 2,500 mcg cap Take  by mouth.  olmesartan (BENICAR) 40 mg tablet olmesartan 40 mg tablet    magnesium oxide (MAG-OX) 400 mg tablet Take 400 mg by mouth daily.  ZINC PO Take 1 Tab by mouth daily.  ASCORBATE CALCIUM (VITAMIN C PO) Take 1 Tab by mouth daily.  cholecalciferol, vitamin D3, 100 mcg (4,000 unit) cap Take 1 Tab by mouth daily.  VITAMIN B COMPLEX PO Take 1 Tab by mouth daily. No current facility-administered medications for this visit.      Allergies   Allergen Reactions    Effexor [Venlafaxine] Other (comments)     Htn,nose bleeding,increase in anxiety    Gluten Other (comments)     Irritable bowel symptoms, bloating     Levaquin [Levofloxacin] Myalgia     Per pt muscle weakness    Lisinopril Diarrhea    Metformin Diarrhea    Other Medication Swelling     Family of grains    Tramadol Other (comments)     Caused hair loss     Immunization History   Administered Date(s) Administered    COVID-19, PFIZER PURPLE top, DILUTE for use, (age 15 y+), IM, 30mcg/0.3mL 03/29/2021, 04/27/2021, 01/06/2022, 07/15/2022    Influenza Vaccine 08/24/2016, 08/01/2017, 10/18/2018, 09/04/2020    Influenza Vaccine Whole 09/21/2010    Influenza, Kirti Briscoe (age 10 mo+) AND AFLURIA, (age 1 y+), PF, 0.5mL 10/18/2018, 10/04/2019, 10/01/2021, 10/28/2022    Pneumococcal Polysaccharide (PPSV-23) 11/01/2018    TD Vaccine 03/05/2006    TDAP Vaccine 08/31/2011    Tdap 10/01/2021        Review of Systems   Constitutional:  Negative for chills and fever. Respiratory:  Negative for cough and shortness of breath. Gastrointestinal:  Positive for abdominal pain and diarrhea. Negative for blood in stool, constipation, nausea, rectal pain and vomiting. Genitourinary:  Negative for dysuria, frequency and urgency. Musculoskeletal:  Positive for back pain. Skin:  Negative for rash. Psychiatric/Behavioral:  The patient is not nervous/anxious. Physical Exam  Vitals and nursing note reviewed. Constitutional:       General: She is not in acute distress. Appearance: Normal appearance. She is obese. HENT:      Head: Normocephalic and atraumatic. Cardiovascular:      Rate and Rhythm: Normal rate and regular rhythm. Pulmonary:      Effort: Pulmonary effort is normal.      Breath sounds: Normal breath sounds. Abdominal:      General: Bowel sounds are normal.      Palpations: Abdomen is soft. Tenderness: There is abdominal tenderness in the right lower quadrant. There is no right CVA tenderness, left CVA tenderness, guarding or rebound. Skin:     General: Skin is warm and dry. Findings: No rash. Neurological:      General: No focal deficit present. Mental Status: She is alert and oriented to person, place, and time. An electronic signature was used to authenticate this note.   -- Luke Coto NP

## 2022-12-09 ENCOUNTER — OFFICE VISIT (OUTPATIENT)
Dept: SURGERY | Age: 62
End: 2022-12-09
Payer: COMMERCIAL

## 2022-12-09 ENCOUNTER — HOSPITAL ENCOUNTER (OUTPATIENT)
Dept: MAMMOGRAPHY | Age: 62
Discharge: HOME | End: 2022-12-09
Payer: COMMERCIAL

## 2022-12-09 VITALS — BODY MASS INDEX: 45.99 KG/M2 | WEIGHT: 293 LBS | HEIGHT: 67 IN

## 2022-12-09 DIAGNOSIS — N60.12 FIBROCYSTIC BREAST CHANGES OF BOTH BREASTS: Primary | ICD-10-CM

## 2022-12-09 DIAGNOSIS — Z12.31 ENCOUNTER FOR SCREENING MAMMOGRAM FOR BREAST CANCER: ICD-10-CM

## 2022-12-09 DIAGNOSIS — Z80.3 FAMILY HISTORY OF BREAST CANCER: ICD-10-CM

## 2022-12-09 DIAGNOSIS — N60.11 FIBROCYSTIC BREAST CHANGES OF BOTH BREASTS: Primary | ICD-10-CM

## 2022-12-09 PROCEDURE — 99213 OFFICE O/P EST LOW 20 MIN: CPT | Performed by: NURSE PRACTITIONER

## 2022-12-09 PROCEDURE — 77063 BREAST TOMOSYNTHESIS BI: CPT

## 2022-12-09 RX ORDER — TRAMADOL HYDROCHLORIDE 50 MG/1
50 TABLET ORAL
COMMUNITY

## 2022-12-09 NOTE — PROGRESS NOTES
HISTORY OF PRESENT ILLNESS  Libby Prieto is a 63 y.o. female. HPI Established patient presents for high risk follow-up due to her family history of breast cancer. Denies breast mass, skin changes, nipple discharge and pain. Breast history -   Referring - historical patient of Dr. Olivier Sam  History of RIGHT breast sebaceous cyst      Family history -   Mother diagnosed with metastatic breast cancer at age 48 and  a few months after diagnosis. Paternal cousin diagnosed with breast cancer in her 52's. 10/2021 - Isabella Osorio lifetime risk - 20.5%       OB History               Para        Term                AB        Living             SAB        IAB        Ectopic        Molar        Multiple        Live Births   0          Obstetric Comments   Menarche:  17. LMP: 2000. # of Children:  0. Age at Delivery of First Child:  N/A. Hysterectomy/oophorectomy:  YES/YES. Breast Bx:  Yes, RIGHT. Hx of Breast Feeding:  N/A.  BCP:  Yes, in the past. Hormone therapy:  Yes. Past Surgical History:   Procedure Laterality Date    COLONOSCOPY  2009    Dr. Nell Munoz    HX COLONOSCOPY  2015    normal.  Dr. Nik Beasley    HX CYST REMOVAL  2017    Dr. Anita Guzman ENDOSCOPY  2015    gastritis    HX HEART CATHETERIZATION      age 5, age 11 due to VSD    HX HERNIA REPAIR  2021    Dr. Galeas Rist at [address] [place] [address]      supracervical    HX LAP CHOLECYSTECTOMY  2014    Dr. Rand Medina/Kun and adhesions    HX SALPINGO-OOPHORECTOMY      HX TONSILLECTOMY      AR FEMUR/KNEE SURG UNLISTED  2010    left, had lipoma removed. Dr. Tamar Rivas       [facility] Results (most recent):  Results from [place] encounter on 10/08/21    [facility] 3D JANI W MAMMO RT DX INCL CAD    Narrative  EXAM: [facility] 3D JANI W MAMMO RT DX INCL CAD, US BREAST RT LIMITED=<3 QUAD    INDICATION: Unchanged right breast palpable lump at 2:00 periareolar. Mother  with perimenopausal breast carcinoma. Taking estradiol. COMPARISON: Screening mammogram on 3/9/2021. Other Mammograms dating back to  6/11/2013. TECHNIQUE: Diagnostic unilateral right digital mediolateral and standard and  spot compression MLO and CC views. Technique includes three-dimensional  tomosynthesis. Computer aided detection (CAD) was utilized. Right breast Limited  ultrasound (2 separate studies reported together). BREAST COMPOSITION: There are scattered fibroglandular densities. FINDINGS: No skin thickening or nipple retraction. No suspicious mass, cluster  of pleomorphic calcifications, or architectural distortion to suggest  malignancy. No change. Right breast ultrasound: 2:00, 4 cm from the nipple at patient directed palpable  lump. There are normal breast tissues. No mass, cyst, fluid collection, or  pathologic shadowing. Impression  No mammographic or sonographic evidence of malignancy. No change. Recommendation:  Screening mammography in March, 2022. Any further management of the palpable  area should be determined on a clinical basis. BI-RADS Assessment Category 1: Negative. I gave results to the patient at the completion of the study. ROS    Physical Exam  Constitutional:       Appearance: Normal appearance. Chest:   Breasts:     Right: No mass, nipple discharge, skin change or tenderness. Left: No mass, nipple discharge, skin change or tenderness. Musculoskeletal:      Comments: Limited ROM due to shoulder issues   Lymphadenopathy:      Upper Body:      Right upper body: No supraclavicular or axillary adenopathy. Left upper body: No supraclavicular or axillary adenopathy. Neurological:      Mental Status: She is alert.    Psychiatric:         Attention and Perception: Attention normal.         Mood and Affect: Mood normal.         Speech: Speech normal.         Behavior: Behavior normal.       Visit Vitals  Ht 5' 7\" (1.702 m)   Wt 327 lb (148.3 kg)   LMP 08/20/2000   BMI 51.22 kg/m²       ASSESSMENT and PLAN    ICD-10-CM ICD-9-CM    1. Fibrocystic breast changes of both breasts  N60.11 610.1     N60.12        2. Family history of breast cancer  Z80.3 V16.3       3. Encounter for screening mammogram for breast cancer  Z12.31 V76.12         Normal exam with no evidence of breast malignancy. Continues on estrace - helps regulate thyroid. Discussed risk and benefits. BSmammogram 3D in 1 year. Mammogram done today processing. Discussed annual breast MRI - had last year and was very uncomfortable due to shoulder issues. Discussed breast density, info provided by mammogram and MRI and lifetime risk is just above 20%. Will plan to have high risk screening breast MRI PRN, but will continue with annual 3D mammograms. Would like to continue annual CBE here. RTC in 1 year or sooner PRN. She is comfortable with this plan. All questions answered and she stated understanding. Total time spent for this patient - 20 minutes.

## 2022-12-15 DIAGNOSIS — R10.31 RIGHT LOWER QUADRANT ABDOMINAL PAIN: Primary | ICD-10-CM

## 2022-12-15 RX ORDER — TRAMADOL HYDROCHLORIDE 50 MG/1
50 TABLET ORAL
Qty: 40 TABLET | Refills: 0 | Status: SHIPPED | OUTPATIENT
Start: 2022-12-15 | End: 2022-12-22

## 2023-01-05 ENCOUNTER — PATIENT MESSAGE (OUTPATIENT)
Dept: INTERNAL MEDICINE CLINIC | Age: 63
End: 2023-01-05

## 2023-01-06 NOTE — TELEPHONE ENCOUNTER
From: [facility]  To: Odalis Byers MD  Sent: 1/5/2023 8:48 PM EST  Subject: Pain in lower right abdomen    Dr Bill Hurt,   I know my CT scan of the abdomen did not show anything major. I am still having intermittent pain in my lower right side. I do not have a pattern of triggers or foods. It comes and goes and hurts really bad some days. I am thinking I have colitis or crohns type of issue. On the bad days, I am taking Tramadol and tylenol. This is coming and going like Endometriosis pain did. I have made an appointment to see Dr Lino hCavez, Gastroenterologist that I had previously seen years ago for a colonoscopy. My appointment is on March 1st. Is there anyway that you'll could get me an appointment earlier. That is just an appointment. I do not have a Colonoscopy scheduled yet. Or, do you need to see me in the office or virtually again first. Please advise. As always, Thank you.  Qian Bustillo

## 2023-01-06 NOTE — TELEPHONE ENCOUNTER
T/C to Dr. Maxine Chen office and spoke with Jordyn Yan and requested a sooner appt for patient due to CT scan inconclusive and patient continues to have lower right abdominal pain. She report that she will message Dr. Grecia Dodd and get back with us. Grateful for the call.

## 2023-01-09 ENCOUNTER — DOCUMENTATION ONLY (OUTPATIENT)
Dept: INTERNAL MEDICINE CLINIC | Age: 63
End: 2023-01-09

## 2023-01-09 NOTE — PROGRESS NOTES
Requested records PN, Lab, diagnostics faxed [phone] to Dr. Isaiah Lopez for continuity of care.

## 2023-03-03 DIAGNOSIS — R10.31 RIGHT LOWER QUADRANT ABDOMINAL PAIN: ICD-10-CM

## 2023-03-03 RX ORDER — TRAMADOL HYDROCHLORIDE 50 MG/1
50 TABLET ORAL
Qty: 28 TABLET | Refills: 0 | Status: SHIPPED | OUTPATIENT
Start: 2023-03-03 | End: 2023-03-10

## 2023-04-22 DIAGNOSIS — Z12.31 ENCOUNTER FOR SCREENING MAMMOGRAM FOR BREAST CANCER: Primary | ICD-10-CM

## 2023-05-15 ENCOUNTER — OFFICE VISIT (OUTPATIENT)
Age: 63
End: 2023-05-15
Payer: COMMERCIAL

## 2023-05-15 VITALS
WEIGHT: 293 LBS | HEART RATE: 101 BPM | BODY MASS INDEX: 51.22 KG/M2 | SYSTOLIC BLOOD PRESSURE: 162 MMHG | DIASTOLIC BLOOD PRESSURE: 86 MMHG

## 2023-05-15 DIAGNOSIS — Z01.419 ENCOUNTER FOR GYNECOLOGICAL EXAMINATION WITHOUT ABNORMAL FINDING: Primary | ICD-10-CM

## 2023-05-15 PROCEDURE — 3077F SYST BP >= 140 MM HG: CPT | Performed by: OBSTETRICS & GYNECOLOGY

## 2023-05-15 PROCEDURE — 99386 PREV VISIT NEW AGE 40-64: CPT | Performed by: OBSTETRICS & GYNECOLOGY

## 2023-05-15 PROCEDURE — 3079F DIAST BP 80-89 MM HG: CPT | Performed by: OBSTETRICS & GYNECOLOGY

## 2023-05-15 NOTE — PROGRESS NOTES
Helen Esqueda is a 63 y.o. female returns for an annual exam     Chief Complaint   Patient presents with    Annual Exam       No LMP recorded. Patient has had a hysterectomy. Problems: problems - she has been having some on and off intermittent pain of the LRQ since Fall 2022. She has had a CT scan and Colonoscopy recently that was normal. She states the pain almost feels like Endometriosis pain again. She was advised to r/o any GYN concerns. Birth Control: status post hysterectomy. Last Pap: normal obtained 1 year(s) ago on 5/9/22. No abnormal cells. She does not have a history of MAGEN 2, 3 or cervical cancer. Last Mammogram: had a recent mammogram 12/9/22 which was negative for malignancy.          Examination chaperoned by Peter Kevin MA.
palpation  Urethra: appears normal  Cervix: normal   Uterus: absent  Adnexa: no adnexal tenderness present, no adnexal masses present  Perineum: perineum within normal limits, no evidence of trauma, no rashes or skin lesions present  Anus: anus within normal limits, no hemorrhoids present  Inguinal Lymph Nodes: no lymphadenopathy present    Skin  General Inspection: no rash, no lesions identified    Neurologic/Psychiatric  Mental Status:  Orientation: grossly oriented to person, place and time  Mood and Affect: mood normal, affect appropriate    Assessment:  Routine gynecologic examination  Her current medical status is satisfactory with no evidence of significant gynecologic issues. Intermittent RLQ     Plan:  Counseled re: diet, exercise, healthy lifestyle  Return for yearly wellness visits  Rec screening mammogram    She could try stopping the estrace and see if her pain improves. She could have adhesions but the only way to know that would be to perform surgery.

## 2023-06-27 DIAGNOSIS — R10.31 RIGHT LOWER QUADRANT PAIN: ICD-10-CM

## 2023-06-28 RX ORDER — TRAMADOL HYDROCHLORIDE 50 MG/1
TABLET ORAL
Qty: 28 TABLET | Refills: 0 | Status: SHIPPED | OUTPATIENT
Start: 2023-06-28 | End: 2023-07-05

## 2023-08-03 NOTE — TELEPHONE ENCOUNTER
Problem: Discharge Planning  Goal: Discharge to home or other facility with appropriate resources  8/3/2023 1505 by Estrella Joseph RN  Outcome: Progressing  8/3/2023 0240 by Ashley Gandhi RN  Outcome: Progressing  Flowsheets (Taken 8/2/2023 1951)  Discharge to home or other facility with appropriate resources:   Identify barriers to discharge with patient and caregiver   Arrange for needed discharge resources and transportation as appropriate     Problem: Pain  Goal: Verbalizes/displays adequate comfort level or baseline comfort level  8/3/2023 1505 by Estrella Joseph RN  Outcome: Progressing  8/3/2023 0240 by Ashley Gandhi RN  Outcome: Progressing     Problem: Respiratory - Adult  Goal: Achieves optimal ventilation and oxygenation  8/3/2023 1505 by Estrella Joseph RN  Outcome: Progressing  8/3/2023 0240 by Ashley Gandhi RN  Outcome: Progressing  Flowsheets (Taken 8/2/2023 1951)  Achieves optimal ventilation and oxygenation:   Assess for changes in respiratory status   Oxygen supplementation based on oxygen saturation or arterial blood gases   Position to facilitate oxygenation and minimize respiratory effort     Problem: Cardiovascular - Adult  Goal: Maintains optimal cardiac output and hemodynamic stability  8/3/2023 1505 by Estrella Joseph RN  Outcome: Progressing  8/3/2023 0240 by Ashley Gandhi RN  Outcome: Progressing  Flowsheets (Taken 8/2/2023 1951)  Maintains optimal cardiac output and hemodynamic stability: Monitor blood pressure and heart rate  Goal: Absence of cardiac dysrhythmias or at baseline  8/3/2023 1505 by Estrella Joseph RN  Outcome: Progressing  8/3/2023 0240 by Ashley Gandhi RN  Outcome: Progressing  Flowsheets (Taken 8/2/2023 1951)  Absence of cardiac dysrhythmias or at baseline:   Monitor cardiac rate and rhythm   Administer antiarrhythmia medication and electrolyte replacement as ordered   Assess for signs of decreased cardiac output     Problem: Skin/Tissue Integrity - Adult  Goal: Incisions, wounds, or Regarding: Prescription Question  Contact: [phone]  ----- Message from [place], Generic sent at 4/18/2019  8:26 AM EDT -----    Dr. Genet Caro,     I need to get my Estradiol 1 MG tablets refilled. Dr. Law Silva has told me to stay on it for now. Can you please refill this for me? Hope you are doing well. Thanks.       Honey Samayoa RN  Outcome: Progressing  Flowsheets (Taken 8/2/2023 1951)  Glucose maintained within prescribed range:   Monitor blood glucose as ordered   Assess for signs and symptoms of hyperglycemia and hypoglycemia   Administer ordered medications to maintain glucose within target range     Problem: Chronic Conditions and Co-morbidities  Goal: Patient's chronic conditions and co-morbidity symptoms are monitored and maintained or improved  8/3/2023 1505 by Ephraim Garcia RN  Outcome: Progressing  8/3/2023 0240 by Santy Cedeño RN  Outcome: Progressing  Flowsheets (Taken 8/2/2023 1951)  Care Plan - Patient's Chronic Conditions and Co-Morbidity Symptoms are Monitored and Maintained or Improved:   Monitor and assess patient's chronic conditions and comorbid symptoms for stability, deterioration, or improvement   Update acute care plan with appropriate goals if chronic or comorbid symptoms are exacerbated and prevent overall improvement and discharge     Problem: Safety - Adult  Goal: Free from fall injury  Outcome: Progressing

## 2023-08-11 DIAGNOSIS — R10.31 RIGHT LOWER QUADRANT PAIN: ICD-10-CM

## 2023-08-11 RX ORDER — TRAMADOL HYDROCHLORIDE 50 MG/1
TABLET ORAL
Qty: 28 TABLET | Refills: 0 | Status: SHIPPED | OUTPATIENT
Start: 2023-08-11 | End: 2023-08-18

## 2023-08-29 ENCOUNTER — TELEPHONE (OUTPATIENT)
Age: 63
End: 2023-08-29

## 2023-08-29 NOTE — TELEPHONE ENCOUNTER
I spoke with patient, she's had a \"clicking\" noise in her left ear for 2.5 weeks. Noise is consistent. She saw an ENT years ago and was told \"it was two bones rubbing together and there is nothing they can do\" pt states those sx were present for this long when she had them years ago. she doesn't recall who she saw because it was a long time ago.  Appt schedule with np Thursday

## 2023-08-29 NOTE — TELEPHONE ENCOUNTER
----- Message from Keira Horn sent at 8/29/2023  2:49 PM EDT -----  Subject: Message to Provider    QUESTIONS  Information for Provider? Patient has been having a clicking/ticking noise   in her left ear for the last two weeks. She would like to know if she   should come in for a visit with Dr. Ally Samaniego or if he would like to refer her   to a specialist for these symptoms. ---------------------------------------------------------------------------  --------------  Christina Verdugo LV  [phone]; OK to leave message on voicemail  ---------------------------------------------------------------------------  --------------  SCRIPT ANSWERS  Relationship to Patient?  Self

## 2023-08-30 SDOH — ECONOMIC STABILITY: FOOD INSECURITY: WITHIN THE PAST 12 MONTHS, YOU WORRIED THAT YOUR FOOD WOULD RUN OUT BEFORE YOU GOT MONEY TO BUY MORE.: PATIENT DECLINED

## 2023-08-30 SDOH — ECONOMIC STABILITY: HOUSING INSECURITY
IN THE LAST 12 MONTHS, WAS THERE A TIME WHEN YOU DID NOT HAVE A STEADY PLACE TO SLEEP OR SLEPT IN A SHELTER (INCLUDING NOW)?: PATIENT REFUSED

## 2023-08-30 SDOH — ECONOMIC STABILITY: FOOD INSECURITY: WITHIN THE PAST 12 MONTHS, THE FOOD YOU BOUGHT JUST DIDN'T LAST AND YOU DIDN'T HAVE MONEY TO GET MORE.: PATIENT DECLINED

## 2023-08-30 SDOH — ECONOMIC STABILITY: TRANSPORTATION INSECURITY
IN THE PAST 12 MONTHS, HAS LACK OF TRANSPORTATION KEPT YOU FROM MEETINGS, WORK, OR FROM GETTING THINGS NEEDED FOR DAILY LIVING?: PATIENT DECLINED

## 2023-08-30 SDOH — ECONOMIC STABILITY: INCOME INSECURITY: HOW HARD IS IT FOR YOU TO PAY FOR THE VERY BASICS LIKE FOOD, HOUSING, MEDICAL CARE, AND HEATING?: PATIENT DECLINED

## 2023-08-31 ENCOUNTER — OFFICE VISIT (OUTPATIENT)
Age: 63
End: 2023-08-31
Payer: COMMERCIAL

## 2023-08-31 VITALS
TEMPERATURE: 98 F | SYSTOLIC BLOOD PRESSURE: 110 MMHG | OXYGEN SATURATION: 99 % | HEIGHT: 67 IN | RESPIRATION RATE: 16 BRPM | WEIGHT: 293 LBS | BODY MASS INDEX: 45.99 KG/M2 | HEART RATE: 94 BPM | DIASTOLIC BLOOD PRESSURE: 78 MMHG

## 2023-08-31 DIAGNOSIS — H93.12 TINNITUS OF LEFT EAR: Primary | ICD-10-CM

## 2023-08-31 DIAGNOSIS — I10 PRIMARY HYPERTENSION: ICD-10-CM

## 2023-08-31 DIAGNOSIS — B37.9 CANDIDIASIS: ICD-10-CM

## 2023-08-31 PROCEDURE — 99214 OFFICE O/P EST MOD 30 MIN: CPT | Performed by: NURSE PRACTITIONER

## 2023-08-31 PROCEDURE — 3074F SYST BP LT 130 MM HG: CPT | Performed by: NURSE PRACTITIONER

## 2023-08-31 PROCEDURE — 3078F DIAST BP <80 MM HG: CPT | Performed by: NURSE PRACTITIONER

## 2023-08-31 RX ORDER — CETIRIZINE HYDROCHLORIDE 10 MG/1
10 TABLET ORAL DAILY
Qty: 30 TABLET | Refills: 0 | Status: SHIPPED | OUTPATIENT
Start: 2023-08-31 | End: 2023-09-30

## 2023-08-31 RX ORDER — NYSTATIN 100000 U/G
CREAM TOPICAL
Qty: 30 G | Refills: 0 | Status: SHIPPED | OUTPATIENT
Start: 2023-08-31

## 2023-08-31 RX ORDER — AZELASTINE 1 MG/ML
1 SPRAY, METERED NASAL 2 TIMES DAILY
Qty: 60 ML | Refills: 1 | Status: SHIPPED | OUTPATIENT
Start: 2023-08-31

## 2023-08-31 RX ORDER — METHYLPREDNISOLONE 4 MG/1
TABLET ORAL
Qty: 1 KIT | Refills: 0 | Status: SHIPPED | OUTPATIENT
Start: 2023-08-31

## 2023-08-31 RX ORDER — TRAMADOL HYDROCHLORIDE 50 MG/1
TABLET ORAL
COMMUNITY
Start: 2018-11-10

## 2023-08-31 ASSESSMENT — ENCOUNTER SYMPTOMS
SINUS PAIN: 0
EYE PAIN: 0
SORE THROAT: 0
BLOOD IN STOOL: 0
EYES NEGATIVE: 1
RESPIRATORY NEGATIVE: 1
EYE REDNESS: 0
DIARRHEA: 0
CHEST TIGHTNESS: 0
RHINORRHEA: 0
VOMITING: 0
COUGH: 0
BACK PAIN: 0
SHORTNESS OF BREATH: 0
ABDOMINAL PAIN: 0
CONSTIPATION: 0
GASTROINTESTINAL NEGATIVE: 1
NAUSEA: 0
SINUS PRESSURE: 0

## 2023-08-31 ASSESSMENT — PATIENT HEALTH QUESTIONNAIRE - PHQ9
SUM OF ALL RESPONSES TO PHQ QUESTIONS 1-9: 0
SUM OF ALL RESPONSES TO PHQ QUESTIONS 1-9: 0
2. FEELING DOWN, DEPRESSED OR HOPELESS: 0
SUM OF ALL RESPONSES TO PHQ QUESTIONS 1-9: 0
SUM OF ALL RESPONSES TO PHQ9 QUESTIONS 1 & 2: 0
1. LITTLE INTEREST OR PLEASURE IN DOING THINGS: 0
SUM OF ALL RESPONSES TO PHQ QUESTIONS 1-9: 0

## 2023-08-31 NOTE — PROGRESS NOTES
Assessment and Plan     1. Tinnitus of left ear: Will treat symptoms, benign physical exam. Medication mode of use and possible side effects discussed. Return instructions given. -     cetirizine (ZYRTEC) 10 MG tablet; Take 1 tablet by mouth daily, Disp-30 tablet, R-0Normal  -     methylPREDNISolone (MEDROL DOSEPACK) 4 MG tablet; Follow-up package instructions, Disp-1 kit, R-0Normal  -     azelastine (ASTELIN) 0.1 % nasal spray; 1 spray by Nasal route 2 times daily Use in each nostril as directed, Disp-60 mL, R-1Normal  2. Candidiasis: Nystatin cream as needed. -     nystatin (MYCOSTATIN) 734770 UNIT/GM cream; Apply topically 2 times daily. , Disp-30 g, R-0, Normal  3. Primary hypertension: Blood pressure at goal. Continue with Olmesartan, HCTZ as rx'd. Benefits, risks, possible drug interactions, and side effects of all new medications were reviewed with the patient. Pt verbalized understanding. An electronic signature was used to authenticate this note. Elsi Mendes, APRN - CNP  8/31/2023    Follow-up and Dispositions    Return if symptoms worsen or fail to improve. History of Present Illness   Chief Complaint     Jean Urbina is a 63 y.o. female here for had concerns including Tinnitus (Patient reports a persistent clicking in the left inner ear for about 18 days. ). Pt reports tinnitus to L ear, clicking sound, onset 18 days ago. Symptoms started as persistent however it has become intermittent in the past 2 days. Pt admits hx of same in past for which she saw ENT with resolution without treatment. Hx of seasonal allergies. Denies taking new medications. Denies dizziness, cough, ear pain or discharge.      Hypertension ROS: taking medications as instructed, no medication side effects noted, home BP monitoring in range of 662'A systolic over 09'D diastolic, no TIA's, no chest pain on exertion, no dyspnea on exertion, no swelling of ankles    Pt reports itchiness to mid buttock area, noted

## 2023-09-01 LAB
HBA1C MFR BLD HPLC: 5.5 %
LDL CHOLESTEROL, EXTERNAL: 103
TOTAL CHOLESTEROL, EXTERNAL: 169

## 2023-09-22 DIAGNOSIS — H93.12 TINNITUS OF LEFT EAR: ICD-10-CM

## 2023-09-22 RX ORDER — CETIRIZINE HYDROCHLORIDE 10 MG/1
10 TABLET ORAL DAILY
Qty: 30 TABLET | Refills: 0 | Status: SHIPPED | OUTPATIENT
Start: 2023-09-22

## 2023-09-29 DIAGNOSIS — R10.31 RIGHT LOWER QUADRANT PAIN: ICD-10-CM

## 2023-09-29 RX ORDER — TRAMADOL HYDROCHLORIDE 50 MG/1
TABLET ORAL
Qty: 28 TABLET | Refills: 0 | OUTPATIENT
Start: 2023-09-29 | End: 2023-10-06

## 2023-09-29 NOTE — TELEPHONE ENCOUNTER
Spoke with patient and advised that her request for Tramadol is refused due to she is not taking it for what it was originally prescribed for-lower abdominal pain. She reports that she is taking it now for joint pain. Advised she has an appt with JOHN Montejo on 10/2/23 and she should discuss that with her then and if it is appropriate. She states understanding and grateful for the call.

## 2023-10-02 ENCOUNTER — OFFICE VISIT (OUTPATIENT)
Age: 63
End: 2023-10-02
Payer: COMMERCIAL

## 2023-10-02 VITALS
SYSTOLIC BLOOD PRESSURE: 130 MMHG | HEART RATE: 93 BPM | WEIGHT: 293 LBS | OXYGEN SATURATION: 98 % | DIASTOLIC BLOOD PRESSURE: 76 MMHG | TEMPERATURE: 98.4 F | RESPIRATION RATE: 16 BRPM | HEIGHT: 67 IN | BODY MASS INDEX: 45.99 KG/M2

## 2023-10-02 DIAGNOSIS — M25.561 ACUTE PAIN OF RIGHT KNEE: Primary | ICD-10-CM

## 2023-10-02 DIAGNOSIS — I10 PRIMARY HYPERTENSION: ICD-10-CM

## 2023-10-02 DIAGNOSIS — Z23 NEEDS FLU SHOT: ICD-10-CM

## 2023-10-02 DIAGNOSIS — G89.29 CHRONIC PAIN OF MULTIPLE JOINTS: ICD-10-CM

## 2023-10-02 DIAGNOSIS — M25.50 CHRONIC PAIN OF MULTIPLE JOINTS: ICD-10-CM

## 2023-10-02 DIAGNOSIS — R60.0 BILATERAL LEG EDEMA: ICD-10-CM

## 2023-10-02 PROCEDURE — 90471 IMMUNIZATION ADMIN: CPT | Performed by: NURSE PRACTITIONER

## 2023-10-02 PROCEDURE — 3078F DIAST BP <80 MM HG: CPT | Performed by: NURSE PRACTITIONER

## 2023-10-02 PROCEDURE — 90674 CCIIV4 VAC NO PRSV 0.5 ML IM: CPT | Performed by: NURSE PRACTITIONER

## 2023-10-02 PROCEDURE — 3075F SYST BP GE 130 - 139MM HG: CPT | Performed by: NURSE PRACTITIONER

## 2023-10-02 PROCEDURE — 99214 OFFICE O/P EST MOD 30 MIN: CPT | Performed by: NURSE PRACTITIONER

## 2023-10-02 RX ORDER — DULOXETIN HYDROCHLORIDE 30 MG/1
30 CAPSULE, DELAYED RELEASE ORAL DAILY
Qty: 90 CAPSULE | Refills: 1 | Status: SHIPPED | OUTPATIENT
Start: 2023-10-02

## 2023-10-02 RX ORDER — FUROSEMIDE 20 MG/1
20 TABLET ORAL DAILY PRN
Qty: 30 TABLET | Refills: 0 | Status: SHIPPED | OUTPATIENT
Start: 2023-10-02 | End: 2023-11-01

## 2023-10-02 ASSESSMENT — ENCOUNTER SYMPTOMS
BLOOD IN STOOL: 0
GASTROINTESTINAL NEGATIVE: 1
DIARRHEA: 0
EYE REDNESS: 0
NAUSEA: 0
SHORTNESS OF BREATH: 0
CONSTIPATION: 0
VOMITING: 0
RHINORRHEA: 0
ABDOMINAL PAIN: 0
COUGH: 0
SINUS PRESSURE: 0
EYES NEGATIVE: 1
EYE PAIN: 0
BACK PAIN: 0
RESPIRATORY NEGATIVE: 1
SINUS PAIN: 0
CHEST TIGHTNESS: 0

## 2023-10-02 ASSESSMENT — PATIENT HEALTH QUESTIONNAIRE - PHQ9
SUM OF ALL RESPONSES TO PHQ QUESTIONS 1-9: 0
SUM OF ALL RESPONSES TO PHQ QUESTIONS 1-9: 0
2. FEELING DOWN, DEPRESSED OR HOPELESS: 0
SUM OF ALL RESPONSES TO PHQ QUESTIONS 1-9: 0
1. LITTLE INTEREST OR PLEASURE IN DOING THINGS: 0
SUM OF ALL RESPONSES TO PHQ9 QUESTIONS 1 & 2: 0
SUM OF ALL RESPONSES TO PHQ QUESTIONS 1-9: 0

## 2023-10-02 NOTE — PROGRESS NOTES
Assessment and Plan     1. Acute pain of right knee: Tylenol extra strength, compression and ice recommended. Referral to PT given. At home exercises and weight management discussed. Return instructions given. [facility] - Physical Therapy at [facility]  2. Chronic pain of multiple joints: Will start treatment with Duloxetine, mode of use and possible side effects discussed. Warm compresses and stretching exercises recommended. -     DULoxetine (CYMBALTA) 30 MG extended release capsule; Take 1 capsule by mouth daily, Disp-90 capsule, R-1Normal  3. Bilateral leg edema: Chronic. Takes Lasix as needed, HCTZ daily. Low sodium diet recommended. -     furosemide (LASIX) 20 MG tablet; Take 1 tablet by mouth daily as needed (edema), Disp-30 tablet, R-0Normal  4. Primary hypertension: At goal. Continue with HCTZ and Olmesartan. -     furosemide (LASIX) 20 MG tablet; Take 1 tablet by mouth daily as needed (edema), Disp-30 tablet, R-0Normal  5. Needs flu shot  -     Influenza, FLUCELVAX, (age 10 mo+), IM, Preservative Free, 0.5 mL       Benefits, risks, possible drug interactions, and side effects of all new medications were reviewed with the patient. Pt verbalized understanding. An electronic signature was used to authenticate this note. JOHNNA Roberts - CNP  10/2/2023      Follow-up and Dispositions    Return if symptoms worsen or fail to improve. History of Present Illness   Chief Complaint     Lisbeth Crystal is a 63 y.o. female here for had concerns including Knee Pain (Mrs. Bishop Sprain reports swelling and pain behind the right knee for about 1 week. ). Pt presents today with reports R knee pain and edema, onset a week ago. Pain is described as pressure and aching, intermittent. Activity worsen pain, resting and ice helped with pain. Pt reports history of multiple joint pain, worsen on bilateral shoulder. She was taking Tramadol with some relief.  Has followed with Ortho in the past.

## 2023-10-30 ENCOUNTER — HOSPITAL ENCOUNTER (OUTPATIENT)
Facility: HOSPITAL | Age: 63
Setting detail: RECURRING SERIES
Discharge: HOME | End: 2023-11-02
Payer: COMMERCIAL

## 2023-10-30 PROCEDURE — 97110 THERAPEUTIC EXERCISES: CPT | Performed by: PHYSICAL THERAPIST

## 2023-10-30 PROCEDURE — 97162 PT EVAL MOD COMPLEX 30 MIN: CPT | Performed by: PHYSICAL THERAPIST

## 2023-10-30 NOTE — PROGRESS NOTES
pain.  Patient will be able to sit for 30 minutes with <2/10 R knee pain. Long Term Goals: To be accomplished in 24 treatments. Patient will be able to walk 1/4 mile without pain or limitation. Patient will be able to get in and out of a car without pain or limitation. Patient will be able to stand for 30 minutes without pain to allow for full participation with shopping activities. Frequency / Duration: Patient to be seen 2 times per week for 24 treatments. Patient/ Caregiver education and instruction: Diagnosis, prognosis, self care, activity modification, and exercises   [x]  Plan of care has been reviewed with PTA Janne Dubin, PT       10/30/2023       7:55 AM        ===================================================================  I certify that the above Therapy Services are being furnished while the patient is under my care. I agree with the treatment plan and certify that this therapy is necessary. Physician's Signature:_________________________   DATE:_________   TIME:________                           Julia Pierce*    ** Signature, Date and Time must be completed for valid certification **  Please sign and fax to [phone].   Thank you

## 2023-11-01 ENCOUNTER — APPOINTMENT (OUTPATIENT)
Facility: HOSPITAL | Age: 63
End: 2023-11-01
Payer: COMMERCIAL

## 2023-11-08 ENCOUNTER — HOSPITAL ENCOUNTER (OUTPATIENT)
Facility: HOSPITAL | Age: 63
Setting detail: RECURRING SERIES
Discharge: HOME | End: 2023-11-11
Payer: COMMERCIAL

## 2023-11-08 PROCEDURE — 97110 THERAPEUTIC EXERCISES: CPT

## 2023-11-08 NOTE — PROGRESS NOTES
PHYSICAL THERAPY - MEDICARE DAILY TREATMENT NOTE (updated 3/23)      Date: 2023          Patient Name:  Sabas Strange :  1960   Medical   Diagnosis:  Acute pain of right knee [M25.561] Treatment Diagnosis:  {RVA Dx WCJD:51344}   Referral Source:  Julia Harper* Insurance:   Payor: Minna Hicks / Plan: [employer] PPO / Product Type: *No Product type* /                     Patient  verified YES    Visit #   Current  / Total *** ***   Time   In / Out *** ***   Total Treatment Time ***   Total Timed Codes ***   1:1 Treatment Time ***      [facility] Totals Reminder:  bill using total billable   min of TIMED therapeutic procedures and modalities. 8-22 min = 1 unit; 23-37 min = 2 units; 38-52 min = 3 units; 53-67 min = 4 units; 68-82 min = 5 units            SUBJECTIVE    Pain Level (0-10 scale): ***    Any medication changes, allergies to medications, adverse drug reactions, diagnosis change, or new procedure performed?: [x] No    [] Yes (see summary sheet for update)  Medications: Verified on Patient Summary List    Subjective functional status/changes:     ***    OBJECTIVE      Therapeutic Procedures: Tx Min Billable or 1:1 Min (if diff from Tx Min) Procedure, Rationale, Specifics     {RVA There Procedures:12182}     Details if applicable:       {RVA There Procedures:27395}     Details if applicable:       {RVA There Procedures:36790}    Details if applicable:       {RVA There Procedures:17304}    Details if applicable:       {RVA There Procedures:51982}     Details if applicable:     *** ***    Total Total         Modalities Rationale:     {InMotion Modality Rationale:19031} to improve patient's ability to progress to PLOF and address remaining functional goals.     ***   min [] Estim Unattended,             type/location:       []  w/ice    []  w/heat        min [] Estim Attended,             type/location:       []  w/ice   []  w/heat         []  w/US   []  TENS insruct            min []  Mechanical

## 2023-11-13 ENCOUNTER — HOSPITAL ENCOUNTER (OUTPATIENT)
Facility: HOSPITAL | Age: 63
Setting detail: RECURRING SERIES
Discharge: HOME | End: 2023-11-16
Payer: COMMERCIAL

## 2023-11-13 PROCEDURE — 97110 THERAPEUTIC EXERCISES: CPT

## 2023-11-13 PROCEDURE — 97140 MANUAL THERAPY 1/> REGIONS: CPT

## 2023-11-13 NOTE — PROGRESS NOTES
PHYSICAL THERAPY - MEDICARE DAILY TREATMENT NOTE (updated 3/23)      Date: 2023          Patient Name:  Johnson Tejada :  1960   Medical   Diagnosis:  Acute pain of right knee [M25.561] Treatment Diagnosis:  M25.561  RIGHT KNEE PAIN    Referral Source:  Julia Ramírez* Insurance:   Payor: Rodrigo Arteaga / Plan: Rodrigo Arteaga PPO / Product Type: *No Product type* /                     Patient  verified YES    Visit #   Current  / Total 3 24   Time   In / Out 415 500   Total Treatment Time 45   Total Timed Codes 45   1:1 Treatment Time 39      [facility] Totals Reminder:  bill using total billable   min of TIMED therapeutic procedures and modalities. 8-22 min = 1 unit; 23-37 min = 2 units; 38-52 min = 3 units; 53-67 min = 4 units; 68-82 min = 5 units            SUBJECTIVE    Pain Level (0-10 scale): 3    Any medication changes, allergies to medications, adverse drug reactions, diagnosis change, or new procedure performed?: [x] No    [] Yes (see summary sheet for update)  Medications: Verified on Patient Summary List    Subjective functional status/changes:     Patient reported she did a lot over the weekend and is feeling it a little more today. OBJECTIVE      Therapeutic Procedures: Tx Min Billable or 1:1 Min (if diff from Tx Min) Procedure, Rationale, Specifics   35  09235 Therapeutic Exercise (timed):  increase ROM, strength, coordination, balance, and proprioception to improve patient's ability to progress to PLOF and address remaining functional goals. (see flow sheet as applicable)     Details if applicable:     10  63786 Manual Therapy (timed):  decrease pain, increase ROM, increase tissue extensibility, and decrease trigger points to improve patient's ability to progress to PLOF and address remaining functional goals. The manual therapy interventions were performed at a separate and distinct time from the therapeutic activities interventions .  (see flow sheet as applicable)    Details if applicable:

## 2023-11-17 ENCOUNTER — APPOINTMENT (OUTPATIENT)
Facility: HOSPITAL | Age: 63
End: 2023-11-17
Payer: COMMERCIAL

## 2023-11-20 ENCOUNTER — APPOINTMENT (OUTPATIENT)
Facility: HOSPITAL | Age: 63
End: 2023-11-20
Payer: COMMERCIAL

## 2023-11-21 ENCOUNTER — HOSPITAL ENCOUNTER (OUTPATIENT)
Facility: HOSPITAL | Age: 63
Setting detail: RECURRING SERIES
Discharge: HOME | End: 2023-11-24
Payer: COMMERCIAL

## 2023-11-21 PROCEDURE — 97110 THERAPEUTIC EXERCISES: CPT | Performed by: PHYSICAL THERAPIST

## 2023-11-21 NOTE — PROGRESS NOTES
w/US   []  TENS insruct            min []  Mechanical Traction,        type/lbs:        []  pro      []  sup           []  int       []  cont            []  before manual           []  after manual     min []  Ultrasound,         settings/location:      min  unbilled []  Ice     []  Heat            location/position:         min []  Vasopneumatic Device,      press/temp:   pre-treatment girth :    post-treatment girth :    measured at (landmark       location) : If using vaso (only need to measure limb vaso being performed on)        min []  Other:      Skin assessment post-treatment (if applicable):    [x]  intact    []  redness- no adverse reaction                 []redness - adverse reaction:          [x]  Patient Education billed concurrently with other procedures   [x] Review HEP    [] Progressed/Changed HEP, detail:    [] Other detail:         Other Objective/Functional Measures        Pain Level at end of session (0-10 scale): 3/10, \"better\"      Assessment   Patient tolerated all exercises without a significant increase in pain. Good pain relief from knee flexion stretching. Patient will continue to benefit from skilled PT / OT services to modify and progress therapeutic interventions, analyze and address functional mobility deficits, analyze and address ROM deficits, analyze and address strength deficits, analyze and address soft tissue restrictions, analyze and cue for proper movement patterns, and analyze and modify for postural abnormalities to address functional deficits and attain remaining goals. Progress toward goals / Updated goals:  []  See Progress Note/Recertification  Slow progress overall towards short term goals, but to be expected considering chronicity of symptoms.       PLAN  YES Continue plan of care  Re-Cert Due: NA  [x]  Upgrade activities as tolerated  []  Discharge due to :  []  Other:      Jennifer Cruz, PT       11/21/2023       5:09 PM

## 2023-11-26 DIAGNOSIS — R60.0 BILATERAL LEG EDEMA: ICD-10-CM

## 2023-11-26 DIAGNOSIS — I10 PRIMARY HYPERTENSION: ICD-10-CM

## 2023-11-26 RX ORDER — FUROSEMIDE 20 MG/1
20 TABLET ORAL DAILY PRN
Qty: 30 TABLET | Refills: 0 | Status: SHIPPED | OUTPATIENT
Start: 2023-11-26 | End: 2023-12-26

## 2023-11-27 ENCOUNTER — HOSPITAL ENCOUNTER (OUTPATIENT)
Facility: HOSPITAL | Age: 63
Setting detail: RECURRING SERIES
Discharge: HOME | End: 2023-11-30
Payer: COMMERCIAL

## 2023-11-27 PROCEDURE — 97110 THERAPEUTIC EXERCISES: CPT

## 2023-11-27 NOTE — PROGRESS NOTES
PHYSICAL THERAPY - MEDICARE DAILY TREATMENT NOTE (updated 3/23)      Date: 2023          Patient Name:  Ivan Decker :  1960   Medical   Diagnosis:  Acute pain of right knee [M25.561] Treatment Diagnosis:  M25.561  RIGHT KNEE PAIN    Referral Source:  Julia Lopez* Insurance:   Payor: Mariangel Centeno / Plan: Mariangel Centeno PPO / Product Type: *No Product type* /                     Patient  verified YES    Visit #   Current  / Total 5 24   Time   In / Out 415 500   Total Treatment Time 45   Total Timed Codes 45   1:1 Treatment Time 39      [facility] Totals Reminder:  bill using total billable   min of TIMED therapeutic procedures and modalities. 8-22 min = 1 unit; 23-37 min = 2 units; 38-52 min = 3 units; 53-67 min = 4 units; 68-82 min = 5 units            SUBJECTIVE    Pain Level (0-10 scale): 3    Any medication changes, allergies to medications, adverse drug reactions, diagnosis change, or new procedure performed?: [x] No    [] Yes (see summary sheet for update)  Medications: Verified on Patient Summary List    Subjective functional status/changes:     Patient reports that she is doing pretty well today. OBJECTIVE      Therapeutic Procedures: Tx Min Billable or 1:1 Min (if diff from Tx Min) Procedure, Rationale, Specifics   45  77000 Therapeutic Exercise (timed):  increase ROM, strength, coordination, balance, and proprioception to improve patient's ability to progress to PLOF and address remaining functional goals. (see flow sheet as applicable)     Details if applicable:                    45     Total Total         Modalities Rationale:     decrease inflammation and decrease pain to improve patient's ability to progress to PLOF and address remaining functional goals.        min [] Estim Unattended,             type/location:       []  w/ice    []  w/heat        min [] Estim Attended,             type/location:       []  w/ice   []  w/heat         []  w/US   []  TENS insruct            min []

## 2023-12-01 ENCOUNTER — APPOINTMENT (OUTPATIENT)
Facility: HOSPITAL | Age: 63
End: 2023-12-01
Payer: COMMERCIAL

## 2023-12-04 ENCOUNTER — APPOINTMENT (OUTPATIENT)
Facility: HOSPITAL | Age: 63
End: 2023-12-04
Payer: COMMERCIAL

## 2023-12-08 ENCOUNTER — OFFICE VISIT (OUTPATIENT)
Age: 63
End: 2023-12-08

## 2023-12-08 ENCOUNTER — APPOINTMENT (OUTPATIENT)
Facility: HOSPITAL | Age: 63
End: 2023-12-08
Payer: COMMERCIAL

## 2023-12-08 VITALS
HEART RATE: 88 BPM | OXYGEN SATURATION: 98 % | DIASTOLIC BLOOD PRESSURE: 79 MMHG | TEMPERATURE: 97.6 F | HEIGHT: 67 IN | SYSTOLIC BLOOD PRESSURE: 128 MMHG | RESPIRATION RATE: 18 BRPM | WEIGHT: 293 LBS | BODY MASS INDEX: 45.99 KG/M2

## 2023-12-08 DIAGNOSIS — I10 PRIMARY HYPERTENSION: ICD-10-CM

## 2023-12-08 DIAGNOSIS — E11.9 TYPE 2 DIABETES MELLITUS WITHOUT COMPLICATION, WITHOUT LONG-TERM CURRENT USE OF INSULIN (HCC): ICD-10-CM

## 2023-12-08 DIAGNOSIS — Z00.00 PREVENTATIVE HEALTH CARE: Primary | ICD-10-CM

## 2023-12-08 DIAGNOSIS — Z23 NEED FOR VACCINATION AGAINST STREPTOCOCCUS PNEUMONIAE: ICD-10-CM

## 2023-12-08 PROBLEM — L50.9 URTICARIA: Status: ACTIVE | Noted: 2022-11-05

## 2023-12-08 PROBLEM — R14.0 ABDOMINAL DISTENSION (GASEOUS): Status: ACTIVE | Noted: 2023-10-19

## 2023-12-08 PROBLEM — J01.90 ACUTE SINUSITIS: Status: ACTIVE | Noted: 2022-11-05

## 2023-12-08 PROBLEM — R10.13 EPIGASTRIC PAIN: Status: RESOLVED | Noted: 2023-10-19 | Resolved: 2023-12-08

## 2023-12-08 PROBLEM — E66.9 OBESITY: Status: ACTIVE | Noted: 2023-12-08

## 2023-12-08 PROBLEM — E88.810 METABOLIC SYNDROME: Status: ACTIVE | Noted: 2022-11-05

## 2023-12-08 PROBLEM — H40.053 BILATERAL OCULAR HYPERTENSION: Status: ACTIVE | Noted: 2022-01-19

## 2023-12-08 PROBLEM — J01.90 ACUTE SINUSITIS: Status: RESOLVED | Noted: 2022-11-05 | Resolved: 2023-12-08

## 2023-12-08 PROBLEM — R10.13 EPIGASTRIC PAIN: Status: ACTIVE | Noted: 2023-10-19

## 2023-12-08 PROBLEM — H25.013 CORTICAL AGE-RELATED CATARACT OF BOTH EYES: Status: ACTIVE | Noted: 2022-01-19

## 2023-12-08 PROBLEM — E07.9 THYROID DISEASE: Status: ACTIVE | Noted: 2023-12-08

## 2023-12-08 PROBLEM — E07.9 THYROID DISEASE: Status: RESOLVED | Noted: 2023-12-08 | Resolved: 2023-12-08

## 2023-12-08 PROBLEM — N95.1 MENOPAUSAL SYMPTOM: Status: ACTIVE | Noted: 2022-11-05

## 2023-12-08 PROBLEM — Q13.0: Status: ACTIVE | Noted: 2022-01-19

## 2023-12-08 PROBLEM — R32 URINARY INCONTINENCE: Status: ACTIVE | Noted: 2022-11-05

## 2023-12-08 PROBLEM — R14.0 ABDOMINAL DISTENSION (GASEOUS): Status: RESOLVED | Noted: 2023-10-19 | Resolved: 2023-12-08

## 2023-12-08 PROBLEM — E87.6 HYPOKALEMIA: Status: ACTIVE | Noted: 2022-11-05

## 2023-12-08 PROBLEM — K59.00 CONSTIPATION, UNSPECIFIED: Status: ACTIVE | Noted: 2023-10-19

## 2023-12-08 PROBLEM — K52.9 CHRONIC DIARRHEA: Status: ACTIVE | Noted: 2023-10-19

## 2023-12-08 PROBLEM — E66.01 OBESITY, MORBID (HCC): Status: RESOLVED | Noted: 2018-07-03 | Resolved: 2023-12-08

## 2023-12-08 RX ORDER — ASCORBIC ACID 500 MG
500 TABLET ORAL DAILY
COMMUNITY

## 2023-12-08 RX ORDER — ZOSTER VACCINE RECOMBINANT, ADJUVANTED 50 MCG/0.5
0.5 KIT INTRAMUSCULAR SEE ADMIN INSTRUCTIONS
Qty: 0.5 ML | Refills: 0 | Status: SHIPPED | OUTPATIENT
Start: 2023-12-08 | End: 2024-06-05

## 2023-12-11 ENCOUNTER — HOSPITAL ENCOUNTER (OUTPATIENT)
Facility: HOSPITAL | Age: 63
Setting detail: RECURRING SERIES
Discharge: HOME | End: 2023-12-14
Payer: COMMERCIAL

## 2023-12-11 PROCEDURE — 97110 THERAPEUTIC EXERCISES: CPT

## 2023-12-11 NOTE — PROGRESS NOTES
PHYSICAL THERAPY - MEDICARE DAILY TREATMENT NOTE (updated 3/23)      Date: 2023          Patient Name:  Alberto Son :  1960   Medical   Diagnosis:  Acute pain of right knee [M25.561] Treatment Diagnosis:  M25.561  RIGHT KNEE PAIN    Referral Source:  Julia Fairbanks* Insurance:   Payor: John Espinoza / Plan: [employer] PPO / Product Type: *No Product type* /                     Patient  verified YES    Visit #   Current  / Total 6 24   Time   In / Out 515 600   Total Treatment Time 45   Total Timed Codes 45   1:1 Treatment Time 39      [facility] Totals Reminder:  bill using total billable   min of TIMED therapeutic procedures and modalities. 8-22 min = 1 unit; 23-37 min = 2 units; 38-52 min = 3 units; 53-67 min = 4 units; 68-82 min = 5 units            SUBJECTIVE    Pain Level (0-10 scale): 3    Any medication changes, allergies to medications, adverse drug reactions, diagnosis change, or new procedure performed?: [x] No    [] Yes (see summary sheet for update)  Medications: Verified on Patient Summary List    Subjective functional status/changes:     Patient reports that she fell going up the front steps and hit her shoulder and knee. Some bruising to L knee and some soreness. OBJECTIVE      Therapeutic Procedures: Tx Min Billable or 1:1 Min (if diff from Tx Min) Procedure, Rationale, Specifics   45  35575 Therapeutic Exercise (timed):  increase ROM, strength, coordination, balance, and proprioception to improve patient's ability to progress to PLOF and address remaining functional goals.  (see flow sheet as applicable)     Details if applicable:                    45     Total Total         [x]  Patient Education billed concurrently with other procedures   [x] Review HEP    [] Progressed/Changed HEP, detail:    [] Other detail:         Other Objective/Functional Measures  Despite increase in soreness, patient was able to tolerate all exercises well      Pain Level at end of session (0-10 scale):

## 2023-12-18 ENCOUNTER — APPOINTMENT (OUTPATIENT)
Facility: HOSPITAL | Age: 63
End: 2023-12-18
Payer: COMMERCIAL

## 2023-12-27 DIAGNOSIS — I10 PRIMARY HYPERTENSION: ICD-10-CM

## 2023-12-27 DIAGNOSIS — R60.0 BILATERAL LEG EDEMA: ICD-10-CM

## 2023-12-27 RX ORDER — FUROSEMIDE 20 MG/1
20 TABLET ORAL DAILY PRN
Qty: 30 TABLET | Refills: 2 | Status: SHIPPED | OUTPATIENT
Start: 2023-12-27 | End: 2024-03-26

## 2024-01-12 ENCOUNTER — HOSPITAL ENCOUNTER (OUTPATIENT)
Facility: HOSPITAL | Age: 64
Discharge: HOME | End: 2024-01-12
Attending: INTERNAL MEDICINE

## 2024-01-12 DIAGNOSIS — Z00.00 PREVENTATIVE HEALTH CARE: ICD-10-CM

## 2024-01-12 PROCEDURE — 75571 CT HRT W/O DYE W/CA TEST: CPT

## 2024-01-22 DIAGNOSIS — I10 ESSENTIAL (PRIMARY) HYPERTENSION: ICD-10-CM

## 2024-01-23 ENCOUNTER — TELEPHONE (OUTPATIENT)
Age: 64
End: 2024-01-23

## 2024-01-23 RX ORDER — HYDROCHLOROTHIAZIDE 25 MG/1
25 TABLET ORAL DAILY
Qty: 90 TABLET | Refills: 1 | Status: SHIPPED | OUTPATIENT
Start: 2024-01-23

## 2024-01-23 NOTE — TELEPHONE ENCOUNTER
T/C to patient to verify pharmacy for medication refill of her Hydrodiuril.25 mg. No answer and LVM.

## 2024-02-02 ENCOUNTER — HOSPITAL ENCOUNTER (OUTPATIENT)
Facility: HOSPITAL | Age: 64
Discharge: HOME | End: 2024-02-02
Payer: COMMERCIAL

## 2024-02-02 DIAGNOSIS — Z12.31 ENCOUNTER FOR SCREENING MAMMOGRAM FOR BREAST CANCER: ICD-10-CM

## 2024-02-02 PROCEDURE — 77063 BREAST TOMOSYNTHESIS BI: CPT

## 2024-02-05 NOTE — PROGRESS NOTES
HISTORY OF PRESENT ILLNESS  Lucien Gunter is a 64 y.o. female. HPI  Presents with complaints of chest congestion with productive cough of thick yellow sputum that has been progressively worsening over the past week. Has run low grade fever at onset of illness but none recently. Has been taking Mucinex with some improvement; has been using her Albuterol nebulizer twice daily since symptoms began with some improvement of chest tightness. Reports several coworkers came to work with cold symptoms and she developed symptoms shortly afterwards which has caused her reactive airway to flare. She was asked to work from home due to her current symptoms and feels like she is being penalized for her illness.      Patient Active Problem List   Diagnosis Code    HTN (hypertension) I10    Hypothyroidism E03.9    AR (allergic rhinitis) J30.9    Impaired fasting glucose R73.01    Biliary dyskinesia K82.8    Endometriosis N80.9    Hyperlipidemia with target LDL less than 130 E78.5    VSD (ventricular septal defect) Q21.0    Menopausal disorder N95.9    Depression F32.9    Gluten intolerance K90.41    Vitamin D deficiency E55.9    MR (mitral regurgitation) I34.0    Sebaceous cyst of skin of breast N60.89    Sebaceous cyst of breast N60.89    Skin abnormalities L98.9    Environmental allergies Z91.09    Obesity, morbid ([address] E66.01    Stress incontinence of urine N39.3    FRANSISCA (obstructive sleep apnea) G47.33     Past Surgical History:   Procedure Laterality Date    COLONOSCOPY  9/2009    Dr. Luciano Magaña    HX COLONOSCOPY  07/29/2015    normal.  Dr. Alley Dawn     [address]    HX CYST REMOVAL  06/2017    Dr. Esthela Ornelas HX ENDOSCOPY  07/29/2015    gastritis    HX HEART CATHETERIZATION      age 5, age 11 due to VSD    HX HYSTERECTOMY      supracervical    HX LAP CHOLECYSTECTOMY  8/01/2014    Dr. Mariah Medina/[facility] and adhesions    HX SALPINGO-OOPHORECTOMY      HX TONSILLECTOMY      DE FEMUR/KNEE SURG Last OV: 11/17/2023   UNLISTED  12/30/2010    left, had lipoma removed.   Dr. Mary Grace Looney History     Socioeconomic History    Marital status: SINGLE     Spouse name: Not on file    Number of children: 0    Years of education: Not on file    Highest education level: Not on file   Occupational History    Not on file   Social Needs    Financial resource strain: Not on file    Food insecurity:     Worry: Not on file     Inability: Not on file    Transportation needs:     Medical: Not on file     Non-medical: Not on file   Tobacco Use    Smoking status: Never Smoker    Smokeless tobacco: Never Used   Substance and Sexual Activity    Alcohol use: No    Drug use: No    Sexual activity: Not Currently   Lifestyle    Physical activity:     Days per week: Not on file     Minutes per session: Not on file    Stress: Not on file   Relationships    Social connections:     Talks on phone: Not on file     Gets together: Not on file     Attends Yazidism service: Not on file     Active member of club or organization: Not on file     Attends meetings of clubs or organizations: Not on file     Relationship status: Not on file    Intimate partner violence:     Fear of current or ex partner: Not on file     Emotionally abused: Not on file     Physically abused: Not on file     Forced sexual activity: Not on file   Other Topics Concern    Not on file   Social History Narrative    Not on file     Family History   Problem Relation Age of Onset    Cancer Mother         breast age 48    Stroke Mother     Heart Disease Father         CABGx4    High Cholesterol Father     Deep Vein Thrombosis Father         left arm    Diabetes Maternal Grandmother     Heart Disease Paternal Grandfather     High Cholesterol Paternal Grandfather     Heart Attack Paternal Grandfather     Arthritis-rheumatoid Paternal Grandmother     Thyroid Disease Paternal Grandmother     Osteoporosis Maternal Aunt      Current Outpatient Medications   Medication Sig    guaiFENesin-dextromethorphan SR (MUCINEX DM) 600-30 mg per tablet Take 1 Tab by mouth two (2) times a day.  clarithromycin (BIAXIN) 500 mg tablet Take 1 Tab by mouth two (2) times a day.  guaiFENesin-codeine (ROBITUSSIN AC) 100-10 mg/5 mL solution Take 5 mL by mouth three (3) times daily as needed for Cough for up to 7 days. Max Daily Amount: 15 mL.  Nebulizer & Compressor machine Please dispense with accessory kit    Nebulizer Accessories kit Please dispense with nebulizer & compressor    albuterol (PROVENTIL VENTOLIN) 2.5 mg /3 mL (0.083 %) nebu 3 mL by Nebulization route every four (4) hours as needed for Wheezing.  budesonide-formoterol (SYMBICORT) 160-4.5 mcg/actuation HFAA Take 2 Puffs by inhalation two (2) times a day.  estradiol (ESTRACE) 1 mg tablet TAKE 1 TABLET BY MOUTH EVERY DAY    olmesartan (BENICAR) 40 mg tablet olmesartan 40 mg tablet    levothyroxine (SYNTHROID) 25 mcg tablet Take 12.5 mcg by mouth. 6 days a week    hydroCHLOROthiazide (HYDRODIURIL) 25 mg tablet TAKE 1 TABLET BY MOUTH EVERY DAY    magnesium oxide (MAG-OX) 400 mg tablet Take 400 mg by mouth daily.  ferrous sulfate (IRON) 325 mg (65 mg iron) tablet Take  by mouth Daily (before breakfast).  SYNTHROID 150 mcg tablet Take 150 mcg by mouth Daily (before breakfast). Daily    ZINC PO Take 1 Tab by mouth daily.  ASCORBATE CALCIUM (VITAMIN C PO) Take 1 Tab by mouth daily.  cholecalciferol, vitamin D3, (VITAMIN D3) 4,000 unit cap Take 1 Tab by mouth daily.  VITAMIN B COMPLEX PO Take 1 Tab by mouth daily. No current facility-administered medications for this visit.       Allergies   Allergen Reactions    Effexor [Venlafaxine] Other (comments)     Htn,nose bleeding,increase in anxiety    Gluten Other (comments)     Irritable bowel symptoms, bloating     Levaquin [Levofloxacin] Myalgia     Per pt muscle weakness    Lisinopril Diarrhea    Metformin Diarrhea    Other Medication Swelling Family of grains     Immunization History   Administered Date(s) Administered    Influenza Vaccine 08/24/2016, 08/01/2017, 10/18/2018    Influenza Vaccine Whole 09/21/2010    Pneumococcal Polysaccharide (PPSV-23) 11/01/2018    TD Vaccine 03/05/2006    TDAP Vaccine 08/31/2011       Review of Systems   Constitutional: Positive for fever and malaise/fatigue. Negative for chills. HENT: Positive for sore throat. Negative for congestion. Respiratory: Positive for cough, sputum production and wheezing. Cardiovascular: Negative for chest pain and palpitations. Gastrointestinal: Negative for nausea and vomiting. Musculoskeletal: Negative for myalgias. Neurological: Negative for dizziness, tingling and headaches. /82 (BP 1 Location: Left arm, BP Patient Position: Sitting)   Pulse 85   Temp 98.7 °F (37.1 °C) (Oral)   Resp 12   Ht 5' 7\" (1.702 m)   Wt 327 lb 9.6 oz (148.6 kg)   LMP 08/20/2000   SpO2 97%   BMI 51.31 kg/m²   Physical Exam  Vitals signs and nursing note reviewed. Constitutional:       Appearance: Normal appearance. HENT:      Head: Normocephalic and atraumatic. Right Ear: Tympanic membrane, ear canal and external ear normal.      Left Ear: Tympanic membrane, ear canal and external ear normal.      Nose: Congestion present. Mouth/Throat:      Mouth: Mucous membranes are moist.      Pharynx: Oropharynx is clear. Neck:      Musculoskeletal: Normal range of motion and neck supple. Cardiovascular:      Rate and Rhythm: Normal rate and regular rhythm. Pulmonary:      Effort: Pulmonary effort is normal.      Breath sounds: Wheezing present. Comments: Upper chest congestion  Skin:     General: Skin is warm and dry. Neurological:      General: No focal deficit present. Mental Status: She is alert and oriented to person, place, and time. ASSESSMENT and PLAN  Diagnoses and all orders for this visit:    1.  Bronchitis  -     clarithromycin (BIAXIN) 500 mg tablet; Take 1 Tab by mouth two (2) times a day. -     guaiFENesin-codeine (ROBITUSSIN AC) 100-10 mg/5 mL solution; Take 5 mL by mouth three (3) times daily as needed for Cough for up to 7 days. Max Daily Amount: 15 mL. 2. Mild intermittent reactive airway disease with acute exacerbation -- continue with use of Albuterol nebulizer as needed; advised to contact office if shortness of breath or wheezing worsens.       lab results and schedule of future lab studies reviewed with patient  reviewed diet, exercise and weight control  reviewed medications and side effects in detail

## 2024-02-16 ENCOUNTER — HOSPITAL ENCOUNTER (OUTPATIENT)
Facility: HOSPITAL | Age: 64
End: 2024-02-16
Attending: INTERNAL MEDICINE
Payer: COMMERCIAL

## 2024-02-16 ENCOUNTER — HOSPITAL ENCOUNTER (OUTPATIENT)
Facility: HOSPITAL | Age: 64
Discharge: HOME | End: 2024-02-16
Attending: INTERNAL MEDICINE
Payer: COMMERCIAL

## 2024-02-16 DIAGNOSIS — R92.8 ABNORMAL MAMMOGRAM: ICD-10-CM

## 2024-02-16 PROCEDURE — G0279 TOMOSYNTHESIS, MAMMO: HCPCS

## 2024-02-16 PROCEDURE — 76642 ULTRASOUND BREAST LIMITED: CPT

## 2024-02-23 ENCOUNTER — OFFICE VISIT (OUTPATIENT)
Age: 64
End: 2024-02-23
Payer: COMMERCIAL

## 2024-02-23 VITALS — HEIGHT: 67 IN | BODY MASS INDEX: 45.99 KG/M2 | WEIGHT: 293 LBS

## 2024-02-23 DIAGNOSIS — N60.12 FIBROCYSTIC BREAST CHANGES OF BOTH BREASTS: Primary | ICD-10-CM

## 2024-02-23 DIAGNOSIS — Z12.31 ENCOUNTER FOR SCREENING MAMMOGRAM FOR MALIGNANT NEOPLASM OF BREAST: ICD-10-CM

## 2024-02-23 DIAGNOSIS — Z80.3 FAMILY HISTORY OF MALIGNANT NEOPLASM OF BREAST: ICD-10-CM

## 2024-02-23 DIAGNOSIS — N60.11 FIBROCYSTIC BREAST CHANGES OF BOTH BREASTS: Primary | ICD-10-CM

## 2024-02-23 PROCEDURE — 99213 OFFICE O/P EST LOW 20 MIN: CPT | Performed by: NURSE PRACTITIONER

## 2024-02-23 NOTE — PROGRESS NOTES
HISTORY OF PRESENT ILLNESS  Rosaura Neri is a 63 y.o. female     HPI  Established patient presents for high risk follow-up due to her family history of breast cancer. Denies breast mass, skin changes, nipple discharge and pain.          Breast history -   Referring - historical patient of Dr. Yanes  History of RIGHT breast sebaceous cyst          Family history -   Mother diagnosed with metastatic breast cancer at age 50 and  a few months after diagnosis.  Paternal cousin diagnosed with breast cancer in her 50's.  10/2021 - Fito Boo lifetime risk - 20.5%         OB History          1    Para   0    Term   0       0    AB   1    Living   0         SAB   1    IAB   0    Ectopic   0    Molar   0    Multiple   0    Live Births   0          Obstetric Comments   Menarche:  15.   LMP: .  # of Children:  0.  Age at Delivery of First Child:  N/A.   Hysterectomy/oophorectomy:  YES/YES.  Breast Bx:  Yes, RIGHT.  Hx of Breast Feeding:  N/A.  BCP:  Yes, in the past. Hormone therapy:  Yes.                      Past Surgical History:   Procedure Laterality Date    CHOLECYSTECTOMY, LAPAROSCOPIC  2014    Dr. Jami Jean-Baptiste/[facility]    COLONOSCOPY  2009    Dr. Rios    COLONOSCOPY  2023    tubular adenoma, hyperplastic polyp.  Dr. White at .  report 23    COLONOSCOPY  2015    normal, Dr. White    CYST REMOVAL  1983    CYST REMOVAL  2017    Dr. Shook    HERNIA REPAIR  2021    Dr. Shook at     LAPAROSCOPIC HYSTERECTOMY      LSH/BSO    OTHER SURGICAL HISTORY  2015    Endoscopy-gastritis    OTHER SURGICAL HISTORY      heart catherization; age 4, age 9 d/t VSD    OTHER SURGICAL HISTORY      femur/knee; left, had lipoma removed.  Dr. Lassiter    TONSILLECTOMY             Mammogram Result (most recent):  [facility] SAMIR DIGITAL DIAGNOSTIC UNILATERAL RIGHT 2024    Narrative  INDICATION:  Abnormal Screening Mammogram    EXAMS: Right Diagnostic 3D

## 2024-03-11 DIAGNOSIS — B37.9 CANDIDIASIS: ICD-10-CM

## 2024-03-12 RX ORDER — NYSTATIN 100000 U/G
CREAM TOPICAL
Qty: 30 G | Refills: 0 | Status: SHIPPED | OUTPATIENT
Start: 2024-03-12

## 2024-04-27 DIAGNOSIS — B37.9 CANDIDIASIS: ICD-10-CM

## 2024-04-29 RX ORDER — NYSTATIN 100000 U/G
CREAM TOPICAL
Qty: 30 G | Refills: 0 | Status: SHIPPED | OUTPATIENT
Start: 2024-04-29

## 2024-05-25 DIAGNOSIS — I10 PRIMARY HYPERTENSION: ICD-10-CM

## 2024-05-25 DIAGNOSIS — R60.0 BILATERAL LEG EDEMA: ICD-10-CM

## 2024-05-25 DIAGNOSIS — B37.9 CANDIDIASIS: ICD-10-CM

## 2024-05-27 RX ORDER — NYSTATIN 100000 U/G
CREAM TOPICAL
Qty: 30 G | Refills: 0 | Status: SHIPPED | OUTPATIENT
Start: 2024-05-27

## 2024-05-27 RX ORDER — FUROSEMIDE 20 MG/1
20 TABLET ORAL DAILY PRN
Qty: 90 TABLET | Refills: 1 | Status: SHIPPED | OUTPATIENT
Start: 2024-05-27 | End: 2024-11-23

## 2024-07-28 DIAGNOSIS — I10 ESSENTIAL (PRIMARY) HYPERTENSION: ICD-10-CM

## 2024-07-29 RX ORDER — HYDROCHLOROTHIAZIDE 25 MG/1
25 TABLET ORAL DAILY
Qty: 90 TABLET | Refills: 1 | Status: SHIPPED | OUTPATIENT
Start: 2024-07-29

## 2024-09-09 ENCOUNTER — OFFICE VISIT (OUTPATIENT)
Age: 64
End: 2024-09-09
Payer: COMMERCIAL

## 2024-09-09 VITALS — DIASTOLIC BLOOD PRESSURE: 79 MMHG | WEIGHT: 293 LBS | SYSTOLIC BLOOD PRESSURE: 132 MMHG | BODY MASS INDEX: 50.53 KG/M2

## 2024-09-09 DIAGNOSIS — Z01.419 ENCOUNTER FOR GYNECOLOGICAL EXAMINATION WITHOUT ABNORMAL FINDING: Primary | ICD-10-CM

## 2024-09-09 PROCEDURE — 3075F SYST BP GE 130 - 139MM HG: CPT | Performed by: OBSTETRICS & GYNECOLOGY

## 2024-09-09 PROCEDURE — 99396 PREV VISIT EST AGE 40-64: CPT | Performed by: OBSTETRICS & GYNECOLOGY

## 2024-09-09 PROCEDURE — 3078F DIAST BP <80 MM HG: CPT | Performed by: OBSTETRICS & GYNECOLOGY

## 2024-09-09 SDOH — ECONOMIC STABILITY: FOOD INSECURITY: WITHIN THE PAST 12 MONTHS, THE FOOD YOU BOUGHT JUST DIDN'T LAST AND YOU DIDN'T HAVE MONEY TO GET MORE.: NEVER TRUE

## 2024-09-09 SDOH — ECONOMIC STABILITY: INCOME INSECURITY: HOW HARD IS IT FOR YOU TO PAY FOR THE VERY BASICS LIKE FOOD, HOUSING, MEDICAL CARE, AND HEATING?: NOT HARD AT ALL

## 2024-09-09 SDOH — ECONOMIC STABILITY: FOOD INSECURITY: WITHIN THE PAST 12 MONTHS, YOU WORRIED THAT YOUR FOOD WOULD RUN OUT BEFORE YOU GOT MONEY TO BUY MORE.: NEVER TRUE

## 2024-09-09 ASSESSMENT — PATIENT HEALTH QUESTIONNAIRE - PHQ9
SUM OF ALL RESPONSES TO PHQ QUESTIONS 1-9: 0
SUM OF ALL RESPONSES TO PHQ9 QUESTIONS 1 & 2: 0
1. LITTLE INTEREST OR PLEASURE IN DOING THINGS: NOT AT ALL
SUM OF ALL RESPONSES TO PHQ QUESTIONS 1-9: 0
2. FEELING DOWN, DEPRESSED OR HOPELESS: NOT AT ALL

## 2024-09-19 LAB
CREATININE, EXTERNAL: 162
HBA1C MFR BLD HPLC: 5.7 %
LDL CHOLESTEROL, EXTERNAL: 97
MICROALBUMIN URINE, EXTERNAL: 13.2
MICROALBUMIN/CREATININE RATIO, EXTERNAL: 8
TOTAL CHOLESTEROL, EXTERNAL: 164

## 2024-11-15 DIAGNOSIS — I10 ESSENTIAL (PRIMARY) HYPERTENSION: ICD-10-CM

## 2024-11-15 RX ORDER — HYDROCHLOROTHIAZIDE 25 MG/1
25 TABLET ORAL DAILY
Qty: 90 TABLET | Refills: 0 | Status: SHIPPED | OUTPATIENT
Start: 2024-11-15

## 2024-12-13 ENCOUNTER — OFFICE VISIT (OUTPATIENT)
Facility: CLINIC | Age: 64
End: 2024-12-13

## 2024-12-13 VITALS
SYSTOLIC BLOOD PRESSURE: 115 MMHG | TEMPERATURE: 97.6 F | HEIGHT: 67 IN | RESPIRATION RATE: 18 BRPM | HEART RATE: 66 BPM | DIASTOLIC BLOOD PRESSURE: 80 MMHG | WEIGHT: 293 LBS | OXYGEN SATURATION: 98 % | BODY MASS INDEX: 45.99 KG/M2

## 2024-12-13 DIAGNOSIS — G89.29 CHRONIC PAIN OF RIGHT ANKLE: ICD-10-CM

## 2024-12-13 DIAGNOSIS — M25.571 CHRONIC PAIN OF RIGHT ANKLE: ICD-10-CM

## 2024-12-13 DIAGNOSIS — R60.0 BILATERAL LEG EDEMA: ICD-10-CM

## 2024-12-13 DIAGNOSIS — E11.9 TYPE 2 DIABETES MELLITUS WITHOUT COMPLICATION, WITHOUT LONG-TERM CURRENT USE OF INSULIN (HCC): ICD-10-CM

## 2024-12-13 DIAGNOSIS — F32.1 MODERATE MAJOR DEPRESSION (HCC): ICD-10-CM

## 2024-12-13 DIAGNOSIS — E78.5 HYPERLIPIDEMIA WITH TARGET LDL LESS THAN 130: ICD-10-CM

## 2024-12-13 DIAGNOSIS — Z00.00 PREVENTATIVE HEALTH CARE: Primary | ICD-10-CM

## 2024-12-13 DIAGNOSIS — I10 ESSENTIAL (PRIMARY) HYPERTENSION: ICD-10-CM

## 2024-12-13 DIAGNOSIS — I87.2 STASIS DERMATITIS OF BOTH LEGS: ICD-10-CM

## 2024-12-13 DIAGNOSIS — E03.9 ACQUIRED HYPOTHYROIDISM: ICD-10-CM

## 2024-12-13 RX ORDER — LEVOTHYROXINE SODIUM 175 UG/1
TABLET ORAL
Qty: 30 TABLET | Refills: 11
Start: 2024-12-13

## 2024-12-13 RX ORDER — ALBUTEROL SULFATE 90 UG/1
INHALANT RESPIRATORY (INHALATION)
COMMUNITY

## 2024-12-13 RX ORDER — OLMESARTAN MEDOXOMIL 20 MG/1
20 TABLET ORAL DAILY
Qty: 90 TABLET | Refills: 3 | Status: SHIPPED | OUTPATIENT
Start: 2024-12-13

## 2024-12-13 RX ORDER — HYDROCHLOROTHIAZIDE 25 MG/1
25 TABLET ORAL DAILY
Qty: 90 TABLET | Refills: 3 | Status: SHIPPED | OUTPATIENT
Start: 2024-12-13

## 2024-12-13 RX ORDER — FLUOXETINE 10 MG/1
10 CAPSULE ORAL DAILY
Qty: 30 CAPSULE | Refills: 0 | Status: SHIPPED | OUTPATIENT
Start: 2024-12-13

## 2024-12-13 RX ORDER — TIRZEPATIDE 12.5 MG/.5ML
12.5 INJECTION, SOLUTION SUBCUTANEOUS WEEKLY
Qty: 2 ML | Refills: 5
Start: 2024-12-13

## 2024-12-13 RX ORDER — TRIAMCINOLONE ACETONIDE 1 MG/G
CREAM TOPICAL
Qty: 30 G | Refills: 3 | Status: SHIPPED | OUTPATIENT
Start: 2024-12-13

## 2024-12-13 RX ORDER — AMMONIUM LACTATE 12 G/100G
CREAM TOPICAL PRN
COMMUNITY
Start: 2024-05-30 | End: 2025-05-30

## 2024-12-13 RX ORDER — LEVOTHYROXINE SODIUM 150 UG/1
TABLET ORAL
Qty: 30 TABLET | Refills: 11 | Status: SHIPPED | OUTPATIENT
Start: 2024-12-13

## 2024-12-13 NOTE — PROGRESS NOTES
Room:  I have reviewed all needed documentation in preparation for visit. Verified patient by name and date of birth  Chief Complaint   Patient presents with    Annual Exam       Vitals:    12/13/24 1037   BP: 115/80   Pulse: 66   Resp: 18   Temp: 97.6 °F (36.4 °C)   TempSrc: Temporal   SpO2: 98%   Weight: (!) 143.3 kg (316 lb)   Height: 1.702 m (5' 7\")        Health Maintenance Due   Topic Date Due    Diabetic Alb to Cr ratio (uACR) test  Never done    Diabetic retinal exam  Never done    Shingles vaccine (1 of 2) Never done    Respiratory Syncytial Virus (RSV) Pregnant or age 60 yrs+ (1 - Risk 60-74 years 1-dose series) Never done    GFR test (Diabetes, CKD 3-4, OR last GFR 15-59)  12/06/2023    A1C test (Diabetic or Prediabetic)  09/01/2024    Lipids  09/02/2024    Diabetic foot exam  12/08/2024        1. \"Have you been to the ER, urgent care clinic since your last visit?  Hospitalized since your last visit?\" No     2. \"Have you seen or consulted any other health care providers outside of the [facility] since your last visit?\" No      3. For patients aged 45-75: Has the patient had a colonoscopy / FIT/ Cologuard? Yes      If the patient is female:    4. For patients aged 40-74: Has the patient had a mammogram within the past 2 years? Yes      5. For patients aged 21-65: Has the patient had a pap smear? Yes

## 2024-12-13 NOTE — PROGRESS NOTES
Rosaura Neri is a 64 y.o. female  Presenting for her annual checkup and follow-up    Reports depression.  Recently worsening.  Sleep is fair.  Feels low in mood, energy, sometimes irritable.  Reports stress at work at [employer] with buy out this summer.  She lost her job but did not get a different job with the same company back.  History and  suddenly of MI 26.   She enjoys her 2 dogs    Seeing Dr. Smart in endocrinology.     Labs done   WBC 6.4, hemoglobin 12.6, platelets 237, normal differential  Glucose 99, BUN 14, creatinine 0.7, sodium 139, potassium 3.7, calcium 9.5, protein 6.5, albumin 4.3,   Bilirubin 0.4, alkaline phosphatase 96, AST 14, ALT 17  Cholesterol 164, triglyceride 99, HDL 49, LDL 97  TSH 0.848, T4 10.9, T3 uptake 30.  Free T41.82, mildly high  Vitamin D 64.4  Urine creatinine 162, urine albumin 13.2, albumin/creatinine ratio 8  Hemoglobin A1c 5.7%    Edema of legs ongoing.    Taking HCTZ.  Not taking lasix often, just prn. about once per week which helps.      No hx compression stockings use.   Heart is healthy per Dr. Hyman.    Diabetes, obesity.  She is taking Mounjaro per Dr. Smart.    Diabetes is well-controlled with A1c of 5.7, as above.  Still figuring out why weight is not coming off.  Cortisol levels will be checked.  Wt Readings from Last 3 Encounters:   24 (!) 143.3 kg (316 lb)   24 (!) 146.3 kg (322 lb 9.6 oz)   24 (!) 144.2 kg (318 lb)     Hypertension  Hypertension ROS: taking medications as instructed, no medication side effects noted, no TIA's, no chest pain on exertion, no dyspnea on exertion, no swelling of ankles     reports that she has never smoked. She has never used smokeless tobacco.    reports that she does not currently use alcohol.   BP Readings from Last 2 Encounters:   24 115/80   24 132/79       Last breast exam: Per gynecology  Last PAP/pelvic: Per gynecology.  Was seen 2029 for  Last colonoscopy:

## 2024-12-19 ENCOUNTER — PATIENT MESSAGE (OUTPATIENT)
Facility: CLINIC | Age: 64
End: 2024-12-19

## 2024-12-23 DIAGNOSIS — F32.1 MODERATE MAJOR DEPRESSION (HCC): ICD-10-CM

## 2024-12-23 RX ORDER — FLUOXETINE 10 MG/1
CAPSULE ORAL DAILY
Qty: 30 CAPSULE | Refills: 0 | OUTPATIENT
Start: 2024-12-23

## 2025-01-31 DIAGNOSIS — I10 PRIMARY HYPERTENSION: ICD-10-CM

## 2025-01-31 DIAGNOSIS — R60.0 BILATERAL LEG EDEMA: ICD-10-CM

## 2025-01-31 RX ORDER — FUROSEMIDE 20 MG/1
20 TABLET ORAL DAILY PRN
Qty: 90 TABLET | Refills: 1 | Status: SHIPPED | OUTPATIENT
Start: 2025-01-31 | End: 2025-07-30

## 2025-02-18 ENCOUNTER — HOSPITAL ENCOUNTER (OUTPATIENT)
Facility: HOSPITAL | Age: 65
Discharge: HOME | End: 2025-02-21
Payer: COMMERCIAL

## 2025-02-18 VITALS — WEIGHT: 293 LBS | BODY MASS INDEX: 49.34 KG/M2

## 2025-02-18 DIAGNOSIS — Z12.31 ENCOUNTER FOR SCREENING MAMMOGRAM FOR MALIGNANT NEOPLASM OF BREAST: ICD-10-CM

## 2025-02-18 PROCEDURE — 77063 BREAST TOMOSYNTHESIS BI: CPT

## 2025-04-03 NOTE — PROGRESS NOTES
HISTORY OF PRESENT ILLNESS  Rosaura Neri is a 64 y.o. female     HPI  Established patient presents for high risk follow-up due to her family history of breast cancer. Denies breast mass, skin changes, nipple discharge and pain.          Breast history -   Referring - historical patient of Dr. Yanes  History of RIGHT breast sebaceous cyst       Family history -   Mother diagnosed with metastatic breast cancer at age 50 and  a few months after diagnosis.  Paternal cousin diagnosed with breast cancer in her 50's.  10/2021 - Fito Boo lifetime risk - 20.5%       OB History          1    Para   0    Term   0       0    AB   1    Living   0         SAB   1    IAB   0    Ectopic   0    Molar   0    Multiple   0    Live Births   0          Obstetric Comments   Menarche:  15.   LMP: .  # of Children:  0.  Age at Delivery of First Child:  N/A.   Hysterectomy/oophorectomy:  YES/YES.  Breast Bx:  Yes, RIGHT.  Hx of Breast Feeding:  N/A.  BCP:  Yes, in the past. Hormone therapy:  Yes.                        Past Surgical History:   Procedure Laterality Date    CHOLECYSTECTOMY, LAPAROSCOPIC  2014    Dr. Jami Jean-Baptiste[facility]    COLONOSCOPY  2009    Dr. Rios    COLONOSCOPY  2023    tubular adenoma, hyperplastic polyp.  Dr. White at .  report 23    COLONOSCOPY  2015    normal, Dr. White    CYST REMOVAL  1983    CYST REMOVAL  2017    Dr. Shook    HERNIA REPAIR  2021    Dr. Shook at     LAPAROSCOPIC HYSTERECTOMY      LSH/BSO    OTHER SURGICAL HISTORY  2015    Endoscopy-gastritis    OTHER SURGICAL HISTORY      heart catherization; age 4, age 9 d/t VSD    OTHER SURGICAL HISTORY      femur/knee; left, had lipoma removed.  Dr. Lassiter    TONSILLECTOMY             Mammogram Result (most recent):  [facility] SAMIR DIGITAL SCREEN BILATERAL 2025    Narrative  EXAMINATION: Bilateral digital screening mammogram with 3-D tomosynthesis    INDICATION:

## 2025-04-04 ENCOUNTER — OFFICE VISIT (OUTPATIENT)
Age: 65
End: 2025-04-04
Payer: COMMERCIAL

## 2025-04-04 VITALS — HEIGHT: 67 IN | WEIGHT: 293 LBS | BODY MASS INDEX: 45.99 KG/M2

## 2025-04-04 DIAGNOSIS — Z80.3 FAMILY HISTORY OF MALIGNANT NEOPLASM OF BREAST: ICD-10-CM

## 2025-04-04 DIAGNOSIS — N60.12 FIBROCYSTIC BREAST CHANGES OF BOTH BREASTS: Primary | ICD-10-CM

## 2025-04-04 DIAGNOSIS — Z12.31 ENCOUNTER FOR SCREENING MAMMOGRAM FOR MALIGNANT NEOPLASM OF BREAST: ICD-10-CM

## 2025-04-04 DIAGNOSIS — N60.11 FIBROCYSTIC BREAST CHANGES OF BOTH BREASTS: Primary | ICD-10-CM

## 2025-04-04 PROCEDURE — 99213 OFFICE O/P EST LOW 20 MIN: CPT | Performed by: NURSE PRACTITIONER

## 2025-04-13 DIAGNOSIS — B37.9 CANDIDIASIS: ICD-10-CM

## 2025-04-14 RX ORDER — NYSTATIN 100000 U/G
CREAM TOPICAL 2 TIMES DAILY
Qty: 30 G | Refills: 0 | Status: SHIPPED | OUTPATIENT
Start: 2025-04-14

## 2025-04-18 LAB
HBA1C MFR BLD HPLC: 5.6 %
LDL CHOLESTEROL, EXTERNAL: 102
TOTAL CHOLESTEROL, EXTERNAL: 169

## 2025-04-23 ENCOUNTER — OFFICE VISIT (OUTPATIENT)
Facility: CLINIC | Age: 65
End: 2025-04-23
Payer: COMMERCIAL

## 2025-04-23 VITALS
TEMPERATURE: 98 F | HEIGHT: 67 IN | SYSTOLIC BLOOD PRESSURE: 129 MMHG | OXYGEN SATURATION: 97 % | WEIGHT: 293 LBS | DIASTOLIC BLOOD PRESSURE: 76 MMHG | HEART RATE: 88 BPM | BODY MASS INDEX: 45.99 KG/M2

## 2025-04-23 DIAGNOSIS — G47.33 OSA (OBSTRUCTIVE SLEEP APNEA): ICD-10-CM

## 2025-04-23 DIAGNOSIS — I10 PRIMARY HYPERTENSION: ICD-10-CM

## 2025-04-23 DIAGNOSIS — Z91.09 ENVIRONMENTAL ALLERGIES: ICD-10-CM

## 2025-04-23 DIAGNOSIS — M19.012 PRIMARY OSTEOARTHRITIS OF LEFT SHOULDER: Primary | ICD-10-CM

## 2025-04-23 PROCEDURE — 99213 OFFICE O/P EST LOW 20 MIN: CPT | Performed by: INTERNAL MEDICINE

## 2025-04-23 PROCEDURE — 3074F SYST BP LT 130 MM HG: CPT | Performed by: INTERNAL MEDICINE

## 2025-04-23 PROCEDURE — 3078F DIAST BP <80 MM HG: CPT | Performed by: INTERNAL MEDICINE

## 2025-04-23 SDOH — ECONOMIC STABILITY: FOOD INSECURITY: WITHIN THE PAST 12 MONTHS, YOU WORRIED THAT YOUR FOOD WOULD RUN OUT BEFORE YOU GOT MONEY TO BUY MORE.: NEVER TRUE

## 2025-04-23 SDOH — ECONOMIC STABILITY: FOOD INSECURITY: WITHIN THE PAST 12 MONTHS, THE FOOD YOU BOUGHT JUST DIDN'T LAST AND YOU DIDN'T HAVE MONEY TO GET MORE.: NEVER TRUE

## 2025-04-23 ASSESSMENT — PATIENT HEALTH QUESTIONNAIRE - PHQ9
1. LITTLE INTEREST OR PLEASURE IN DOING THINGS: NOT AT ALL
3. TROUBLE FALLING OR STAYING ASLEEP: NOT AT ALL
SUM OF ALL RESPONSES TO PHQ QUESTIONS 1-9: 0
9. THOUGHTS THAT YOU WOULD BE BETTER OFF DEAD, OR OF HURTING YOURSELF: NOT AT ALL
5. POOR APPETITE OR OVEREATING: NOT AT ALL
SUM OF ALL RESPONSES TO PHQ QUESTIONS 1-9: 0
10. IF YOU CHECKED OFF ANY PROBLEMS, HOW DIFFICULT HAVE THESE PROBLEMS MADE IT FOR YOU TO DO YOUR WORK, TAKE CARE OF THINGS AT HOME, OR GET ALONG WITH OTHER PEOPLE: NOT DIFFICULT AT ALL
4. FEELING TIRED OR HAVING LITTLE ENERGY: NOT AT ALL
8. MOVING OR SPEAKING SO SLOWLY THAT OTHER PEOPLE COULD HAVE NOTICED. OR THE OPPOSITE, BEING SO FIGETY OR RESTLESS THAT YOU HAVE BEEN MOVING AROUND A LOT MORE THAN USUAL: NOT AT ALL
2. FEELING DOWN, DEPRESSED OR HOPELESS: NOT AT ALL
6. FEELING BAD ABOUT YOURSELF - OR THAT YOU ARE A FAILURE OR HAVE LET YOURSELF OR YOUR FAMILY DOWN: NOT AT ALL
7. TROUBLE CONCENTRATING ON THINGS, SUCH AS READING THE NEWSPAPER OR WATCHING TELEVISION: NOT AT ALL
SUM OF ALL RESPONSES TO PHQ QUESTIONS 1-9: 0
SUM OF ALL RESPONSES TO PHQ QUESTIONS 1-9: 0

## 2025-04-23 NOTE — PROGRESS NOTES
Rosaura Neri was referred for evaluation by:Dr. Enio Glasgow for Pre- Op Evaluation.  Please see encounter details and orders for consultative summary.    Type of surgery : reverse total shoulder arthroscopy  Surgery site : left shoulder  Anesthesia type: general  Date of procedure:  5/15/25    Allergies: No Known Allergies  Latex allergy: no  Prior reactions to anesthesia:  None  Hx of MRSA:  none    Functional status: she is able to ambulate up 1  flight of steps with no  shortness of breath, chest pain  Prior cardiac evaluation:   sees Dr. Hyman.   Appt today.     Labs done 4/18/2025 by her endocrinologist, Dr. Smart  WBC 6.5, hemoglobin 12.7, platelet 225, normal differential  Glucose 103, BUN 14, creatinine 0.69, sodium 141, potassium 4.3, calcium 9.8  eGFR 98  Liver enzymes normal  TSH 0.886, free T4 1.73  Hemoglobin A1c 5.6%  Vitamin D 25-OH 76.8  Cholesterol 169, triglycerides 92, HDL 50,     Allergies   Allergen Reactions    Malt Anaphylaxis    Bee Venom Swelling    Bupropion Other (See Comments)     Reaction Type: Allergy; Reaction(s): facial edema    Gluten Other (See Comments)     GI intolerance.  Irritable bowel symptoms, bloating       Levofloxacin Myalgia     Per pt muscle weakness    Lisinopril Diarrhea    Metformin Diarrhea    Tramadol Other (See Comments)     Caused hair loss    Venlafaxine Other (See Comments)     Htn,nose bleeding,increase in anxiety       Current Outpatient Medications   Medication Sig    nystatin (MYCOSTATIN) 109445 UNIT/GM cream APPLY TO AFFECTED AREA TWICE A DAY    furosemide (LASIX) 20 MG tablet TAKE 1 TABLET BY MOUTH DAILY AS NEEDED (EDEMA).    albuterol sulfate HFA (PROVENTIL;VENTOLIN;PROAIR) 108 (90 Base) MCG/ACT inhaler INHALE 1 TO 2 PUFFS EVERY 4 TO 6 HOURS AS NEEDED FOR WHEEZE FOR UP TO 30 DAYS    ammonium lactate (AMLACTIN) 12 % cream Apply topically as needed    olmesartan (BENICAR) 20 MG tablet Take 1 tablet by mouth daily    hydroCHLOROthiazide

## 2025-04-23 NOTE — PROGRESS NOTES
Identified pt with two pt identifiers(name and ). Reviewed record in preparation for visit and have obtained necessary documentation. All patient medications has been reviewed.  Chief Complaint   Patient presents with    Pre-op Exam     Left shoulder replacement        Health Maintenance Due   Topic    Diabetic retinal exam     Respiratory Syncytial Virus (RSV) Pregnant or age 60 yrs+ (1 - Risk 60-74 years 1-dose series)    GFR test (Diabetes, CKD 3-4, OR last GFR 15-59)      Health Maintenance Review: Patient reminded of \"due or due soon\" health maintenance. I have asked the patient to contact his/her primary care provider (PCP) for follow-up on his/her health maintenance.    Wt Readings from Last 3 Encounters:   25 (!) 143.9 kg (317 lb 3.2 oz)   25 (!) 142.9 kg (315 lb)   25 (!) 142.9 kg (315 lb)     Temp Readings from Last 3 Encounters:   25 98 °F (36.7 °C)   24 97.6 °F (36.4 °C) (Temporal)   23 97.6 °F (36.4 °C) (Oral)     BP Readings from Last 3 Encounters:   25 129/76   24 115/80   24 132/79     Pulse Readings from Last 3 Encounters:   25 88   24 66   23 88       1. \"Have you been to the ER, urgent care clinic since your last visit?  Hospitalized since your last visit?\" No    2. \"Have you seen or consulted any other health care providers outside of the [facility] since your last visit?\"  [facility] [place], Cardiology, Endocrinology       3. For patients aged 45-75: Has the patient had a colonoscopy / FIT/ Cologuard? Yes - Care Gap present. Most recent result on file    If the patient is female:    4. For patients aged 40-74: Has the patient had a mammogram within the past 2 years? Yes - Care Gap present. Most recent result on file    5. For patients aged 21-65: Has the patient had a pap smear? No    Patient is accompanied by self I have received verbal consent from Rosaura Neri to discuss any/all medical information while they

## 2025-04-25 RX ORDER — ROSUVASTATIN CALCIUM 10 MG/1
10 TABLET, COATED ORAL DAILY
Qty: 90 TABLET | Refills: 1
Start: 2025-04-25

## 2025-08-10 DIAGNOSIS — R60.0 BILATERAL LEG EDEMA: ICD-10-CM

## 2025-08-10 DIAGNOSIS — I10 PRIMARY HYPERTENSION: ICD-10-CM

## 2025-08-11 RX ORDER — FUROSEMIDE 20 MG/1
20 TABLET ORAL DAILY PRN
Qty: 90 TABLET | Refills: 1 | Status: SHIPPED | OUTPATIENT
Start: 2025-08-11 | End: 2026-02-07